# Patient Record
Sex: FEMALE | Race: WHITE | Employment: OTHER | URBAN - METROPOLITAN AREA
[De-identification: names, ages, dates, MRNs, and addresses within clinical notes are randomized per-mention and may not be internally consistent; named-entity substitution may affect disease eponyms.]

---

## 2018-09-25 ENCOUNTER — OFFICE VISIT (OUTPATIENT)
Dept: INTERNAL MEDICINE CLINIC | Age: 68
End: 2018-09-25
Payer: MEDICARE

## 2018-09-25 VITALS
SYSTOLIC BLOOD PRESSURE: 146 MMHG | BODY MASS INDEX: 39.69 KG/M2 | HEIGHT: 69 IN | WEIGHT: 268 LBS | DIASTOLIC BLOOD PRESSURE: 72 MMHG

## 2018-09-25 DIAGNOSIS — Z23 NEED FOR PROPHYLACTIC VACCINATION AGAINST DIPHTHERIA-TETANUS-PERTUSSIS (DTP): ICD-10-CM

## 2018-09-25 DIAGNOSIS — Z23 NEED FOR PROPHYLACTIC VACCINATION AND INOCULATION AGAINST VARICELLA: ICD-10-CM

## 2018-09-25 DIAGNOSIS — Z13.1 ENCOUNTER FOR SCREENING FOR DIABETES MELLITUS: ICD-10-CM

## 2018-09-25 DIAGNOSIS — G47.33 OBSTRUCTIVE SLEEP APNEA SYNDROME: ICD-10-CM

## 2018-09-25 DIAGNOSIS — E03.8 OTHER SPECIFIED HYPOTHYROIDISM: ICD-10-CM

## 2018-09-25 DIAGNOSIS — Z12.31 ENCOUNTER FOR SCREENING MAMMOGRAM FOR BREAST CANCER: ICD-10-CM

## 2018-09-25 DIAGNOSIS — E66.01 CLASS 2 SEVERE OBESITY DUE TO EXCESS CALORIES WITH SERIOUS COMORBIDITY AND BODY MASS INDEX (BMI) OF 39.0 TO 39.9 IN ADULT (HCC): ICD-10-CM

## 2018-09-25 DIAGNOSIS — Z23 NEED FOR PROPHYLACTIC VACCINATION AGAINST STREPTOCOCCUS PNEUMONIAE (PNEUMOCOCCUS): ICD-10-CM

## 2018-09-25 DIAGNOSIS — J30.9 ALLERGIC RHINITIS, UNSPECIFIED SEASONALITY, UNSPECIFIED TRIGGER: ICD-10-CM

## 2018-09-25 DIAGNOSIS — E78.00 PURE HYPERCHOLESTEROLEMIA: ICD-10-CM

## 2018-09-25 DIAGNOSIS — F33.41 RECURRENT MAJOR DEPRESSIVE DISORDER, IN PARTIAL REMISSION (HCC): ICD-10-CM

## 2018-09-25 DIAGNOSIS — M15.9 PRIMARY OSTEOARTHRITIS INVOLVING MULTIPLE JOINTS: ICD-10-CM

## 2018-09-25 DIAGNOSIS — Z13.220 SCREENING FOR HYPERLIPIDEMIA: ICD-10-CM

## 2018-09-25 DIAGNOSIS — Z12.11 SCREENING FOR COLON CANCER: ICD-10-CM

## 2018-09-25 DIAGNOSIS — M16.11 ARTHRITIS OF RIGHT HIP: ICD-10-CM

## 2018-09-25 DIAGNOSIS — Z78.0 POST-MENOPAUSAL: ICD-10-CM

## 2018-09-25 DIAGNOSIS — E11.9 TYPE 2 DIABETES MELLITUS WITHOUT COMPLICATION, WITHOUT LONG-TERM CURRENT USE OF INSULIN (HCC): ICD-10-CM

## 2018-09-25 DIAGNOSIS — I30.0 IDIOPATHIC PERICARDITIS, UNSPECIFIED CHRONICITY: ICD-10-CM

## 2018-09-25 DIAGNOSIS — Z86.010 PERSONAL HISTORY OF COLONIC POLYPS: ICD-10-CM

## 2018-09-25 DIAGNOSIS — K57.30 DIVERTICULOSIS OF LARGE INTESTINE WITHOUT HEMORRHAGE: ICD-10-CM

## 2018-09-25 DIAGNOSIS — M79.672 INTRACTABLE LEFT HEEL PAIN: ICD-10-CM

## 2018-09-25 PROBLEM — I31.9 PERICARDITIS: Status: ACTIVE | Noted: 2018-09-25

## 2018-09-25 PROBLEM — Z86.0100 PERSONAL HISTORY OF COLONIC POLYPS: Status: ACTIVE | Noted: 2018-09-25

## 2018-09-25 PROBLEM — E66.812 CLASS 2 SEVERE OBESITY DUE TO EXCESS CALORIES WITH SERIOUS COMORBIDITY AND BODY MASS INDEX (BMI) OF 39.0 TO 39.9 IN ADULT: Status: ACTIVE | Noted: 2018-09-25

## 2018-09-25 PROBLEM — M15.0 PRIMARY OSTEOARTHRITIS INVOLVING MULTIPLE JOINTS: Status: ACTIVE | Noted: 2018-09-25

## 2018-09-25 PROCEDURE — 3046F HEMOGLOBIN A1C LEVEL >9.0%: CPT | Performed by: INTERNAL MEDICINE

## 2018-09-25 PROCEDURE — G8400 PT W/DXA NO RESULTS DOC: HCPCS | Performed by: INTERNAL MEDICINE

## 2018-09-25 PROCEDURE — 4040F PNEUMOC VAC/ADMIN/RCVD: CPT | Performed by: INTERNAL MEDICINE

## 2018-09-25 PROCEDURE — 1123F ACP DISCUSS/DSCN MKR DOCD: CPT | Performed by: INTERNAL MEDICINE

## 2018-09-25 PROCEDURE — 1036F TOBACCO NON-USER: CPT | Performed by: INTERNAL MEDICINE

## 2018-09-25 PROCEDURE — 3017F COLORECTAL CA SCREEN DOC REV: CPT | Performed by: INTERNAL MEDICINE

## 2018-09-25 PROCEDURE — G8427 DOCREV CUR MEDS BY ELIG CLIN: HCPCS | Performed by: INTERNAL MEDICINE

## 2018-09-25 PROCEDURE — 2022F DILAT RTA XM EVC RTNOPTHY: CPT | Performed by: INTERNAL MEDICINE

## 2018-09-25 PROCEDURE — 99205 OFFICE O/P NEW HI 60 MIN: CPT | Performed by: INTERNAL MEDICINE

## 2018-09-25 PROCEDURE — G8417 CALC BMI ABV UP PARAM F/U: HCPCS | Performed by: INTERNAL MEDICINE

## 2018-09-25 PROCEDURE — 1090F PRES/ABSN URINE INCON ASSESS: CPT | Performed by: INTERNAL MEDICINE

## 2018-09-25 PROCEDURE — 1101F PT FALLS ASSESS-DOCD LE1/YR: CPT | Performed by: INTERNAL MEDICINE

## 2018-09-25 RX ORDER — RANITIDINE 150 MG/1
150 CAPSULE ORAL 2 TIMES DAILY
COMMUNITY
End: 2019-07-17 | Stop reason: SDUPTHER

## 2018-09-25 RX ORDER — MELOXICAM 15 MG/1
15 TABLET ORAL DAILY
COMMUNITY
End: 2019-09-26 | Stop reason: SDUPTHER

## 2018-09-25 RX ORDER — FLUTICASONE PROPIONATE 50 MCG
1 SPRAY, SUSPENSION (ML) NASAL DAILY
COMMUNITY
End: 2021-03-23

## 2018-09-25 RX ORDER — ATORVASTATIN CALCIUM 10 MG/1
10 TABLET, FILM COATED ORAL DAILY
COMMUNITY
End: 2019-06-13 | Stop reason: SDUPTHER

## 2018-09-25 RX ORDER — OMEPRAZOLE 40 MG/1
40 CAPSULE, DELAYED RELEASE ORAL DAILY
COMMUNITY
End: 2019-12-16 | Stop reason: SDUPTHER

## 2018-09-25 RX ORDER — ASPIRIN/CALCIUM/MAG/ALUMINUM 325 MG
TABLET ORAL
COMMUNITY
End: 2019-11-20

## 2018-09-25 RX ORDER — OLMESARTAN MEDOXOMIL AND HYDROCHLOROTHIAZIDE 40/12.5 40; 12.5 MG/1; MG/1
1 TABLET ORAL DAILY
COMMUNITY
End: 2019-11-12 | Stop reason: SDUPTHER

## 2018-09-25 RX ORDER — METOPROLOL SUCCINATE 50 MG/1
100 TABLET, EXTENDED RELEASE ORAL DAILY
COMMUNITY
End: 2018-11-06 | Stop reason: SDUPTHER

## 2018-09-25 RX ORDER — FERROUS SULFATE 325(65) MG
325 TABLET ORAL
COMMUNITY
End: 2019-11-20

## 2018-09-25 RX ORDER — BUPROPION HYDROCHLORIDE 150 MG/1
150 TABLET ORAL EVERY MORNING
COMMUNITY
End: 2019-02-28 | Stop reason: SDUPTHER

## 2018-09-25 RX ORDER — LEVOTHYROXINE SODIUM 0.03 MG/1
25 TABLET ORAL DAILY
COMMUNITY
End: 2020-02-10 | Stop reason: SDUPTHER

## 2018-09-25 ASSESSMENT — PATIENT HEALTH QUESTIONNAIRE - PHQ9
1. LITTLE INTEREST OR PLEASURE IN DOING THINGS: 1
SUM OF ALL RESPONSES TO PHQ QUESTIONS 1-9: 1
SUM OF ALL RESPONSES TO PHQ9 QUESTIONS 1 & 2: 1
2. FEELING DOWN, DEPRESSED OR HOPELESS: 0
SUM OF ALL RESPONSES TO PHQ QUESTIONS 1-9: 1

## 2018-09-25 ASSESSMENT — ENCOUNTER SYMPTOMS
WHEEZING: 0
BACK PAIN: 1
CHEST TIGHTNESS: 0
ABDOMINAL PAIN: 0
COLOR CHANGE: 0

## 2018-09-25 NOTE — PATIENT INSTRUCTIONS
Check bp 3-4 times a week preferably when you get home from work and other days in am after sitting for 5 mins but no caffeine,smoking or exercise for 30 mins prior-  and in a chair w a back and both feet down-recheck in another 5 mins  Goal is under 130/80  We will adjust medication if it is not coming down below this  Record over the next 1-2 mos and feel free to call if you are concerned  Do labs and see me in 3 mos for a follow up       High-Fiber Diet  Fiber is found in fruits, vegetables, and grains. A high-fiber diet encourages the addition of more whole grains, legumes, fruits, and vegetables in your diet. Gradually increase the fiber in your diet to 28-30 g daily. Eat a variety of high-fiber foods instead of only a select few type of foods. If you cannot get the amount of fiber needed in your diet you can supplement with Benefiber. Mix w water but it doesn't taste like anything!! Be sure to increase the fiber GRADUALLY to avoid stomach pain and gaseousness. For constipation short term use miralax - a safe stool softener/laxative- start w 1 capful in water once daily- increase by 1 capful daily until it works up to 3 capfuls twice daily. Exercise and drinking lots of water helps also!!!!  PURPOSE  · To increase stool bulk. · To make bowel movements more regular to prevent constipation. · To lower cholesterol. · To prevent overeating. WHEN IS THIS DIET USED? · It may be used if you have constipation and hemorrhoids. · It may be used if you have uncomplicated diverticulosis (intestine condition) and irritable bowel syndrome. · It may be used if you need help with weight management. · It may be used if you want to add it to your diet as a protective measure against atherosclerosis, diabetes, and cancer. SOURCES OF FIBER  · Whole-grain breads and cereals. · Fruits, such as apples, oranges, bananas, berries, prunes, and pears.   · Vegetables, such as green peas, carrots, sweet potatoes, beets, broccoli, cabbage, spinach, and artichokes. · Legumes, such split peas, soy, lentils. · Almonds.   FIBER CONTENT IN FOODS  Starches and Grains / Dietary Fiber (g)  · Cheerios, 1 cup / 3 g  · Corn Flakes cereal, 1 cup / 0.7 g  · Rice crispy treat cereal, 1¼ cup / 0.3 g  · Instant oatmeal (cooked), ½ cup / 2 g  · Frosted wheat cereal, 1 cup / 5.1 g  · Brown, long-grain rice (cooked), 1 cup / 3.5 g  · White, long-grain rice (cooked), 1 cup / 0.6 g  · Enriched macaroni (cooked), 1 cup / 2.5 g  Legumes / Dietary Fiber (g)  · Baked beans (canned, plain, or vegetarian), ½ cup / 5.2 g  · Kidney beans (canned), ½ cup / 6.8 g  · Segal beans (cooked), ½ cup / 5.5 g  Breads and Crackers / Dietary Fiber (g)  · Plain or honey omar crackers, 2 squares / 0.7 g  · Saltine crackers, 3 squares / 0.3 g  · Plain, salted pretzels, 10 pieces / 1.8 g  · Whole-wheat bread, 1 slice / 1.9 g  · White bread, 1 slice / 0.7 g  · Raisin bread, 1 slice / 1.2 g  · Plain bagel, 3 oz / 2 g  · Flour tortilla, 1 oz / 0.9 g  · Corn tortilla, 1 small / 1.5 g  · Hamburger or hotdog bun, 1 small / 0.9 g  Fruits / Dietary Fiber (g)  · Apple with skin, 1 medium / 4.4 g  · Sweetened applesauce, ½ cup / 1.5 g  · Banana, ½ medium / 1.5 g  · Grapes, 10 grapes / 0.4 g  · Orange, 1 small / 2.3 g  · Raisin, 1.5 oz / 1.6 g  · Melon, 1 cup / 1.4 g  Vegetables / Dietary Fiber (g)  · Green beans (canned), ½ cup / 1.3 g  · Carrots (cooked), ½ cup / 2.3 g  · Broccoli (cooked), ½ cup / 2.8 g  · Peas (cooked), ½ cup / 4.4 g  · Mashed potatoes, ½ cup / 1.6 g  · Lettuce, 1 cup / 0.5 g  · Corn (canned), ½ cup / 1.6 g  · Tomato, ½ cup / 1.1 g    Do labs in about 2 mos and see me a few days later for a recheck

## 2018-09-25 NOTE — PROGRESS NOTES
Subjective:      Patient ID: Sima Marley is a 79 y.o. female. Chief Complaint   Patient presents with   Becca Judd New Doctor     new patient labs needed? patient brought some records,cardiologist needed? In as a new patient. Pt has MULTIPLE issues. Has been having issues w bp meds as valsartan was recalled and now on olmesartan w fair results- Pt taking thyroid meds reliably every am fasting-struggles w stress/anxoiety and depression related to her chronic pain and joint issues. Pt unsure if labs were checking for thyroid recently- has struggled w her weight for years- had lost 75#'s but regained it after a knee injury 2010- then this triggered multiple joint issues- now s/p 5 joint replacements!! Needs right hip replaced soon- also has sleep apnea and using cpap regularly w good response has had severe daytime fatigue-has h/o pericarditis but not active now- sleep is difficult w hip pain    Outpatient Encounter Prescriptions as of 9/25/2018   Medication Sig Dispense Refill    zoster recombinant adjuvanted vaccine (SHINGRIX) 50 MCG SUSR injection 50 MCG IM then repeat 2-6 months.  0.5 mL 1    Aspirin Buf,[id number]-[id number], (BUFFERED ASPIRIN) 325 MG TABS Take by mouth      ferrous sulfate 325 (65 Fe) MG tablet Take 325 mg by mouth daily (with breakfast)      aspirin 81 MG tablet Take 81 mg by mouth daily      fluticasone (FLONASE) 50 MCG/ACT nasal spray 1 spray by Each Nare route daily      levothyroxine (SYNTHROID) 25 MCG tablet Take 25 mcg by mouth Daily      atorvastatin (LIPITOR) 10 MG tablet Take 10 mg by mouth daily      meloxicam (MOBIC) 15 MG tablet Take 15 mg by mouth daily      omeprazole (PRILOSEC) 40 MG delayed release capsule Take 40 mg by mouth daily      buPROPion (WELLBUTRIN XL) 150 MG extended release tablet Take 150 mg by mouth every morning      ranitidine (ZANTAC) 150 MG capsule Take 150 mg by mouth 2 times daily      metoprolol succinate (TOPROL XL) 50 MG extended release

## 2018-10-01 ENCOUNTER — TELEPHONE (OUTPATIENT)
Dept: INTERNAL MEDICINE CLINIC | Age: 68
End: 2018-10-01

## 2018-11-06 ENCOUNTER — TELEPHONE (OUTPATIENT)
Dept: INTERNAL MEDICINE CLINIC | Age: 68
End: 2018-11-06

## 2018-11-06 RX ORDER — METOPROLOL SUCCINATE 100 MG/1
100 TABLET, EXTENDED RELEASE ORAL DAILY
Qty: 90 TABLET | Refills: 3 | Status: SHIPPED | OUTPATIENT
Start: 2018-11-06 | End: 2019-08-27 | Stop reason: SDUPTHER

## 2018-11-06 NOTE — TELEPHONE ENCOUNTER
Patient reports that she takes 100 mg per day of med listed below, please send an update order to pharmacy listed.     metoprolol succinate (TOPROL XL) 50 MG extended release tablet     [facility] Lynn Pascal

## 2018-11-06 NOTE — TELEPHONE ENCOUNTER
Left message on machine for patient per Dr. William Garcia. To call back and let the office know if she had a physical this year by previous physician so appt can be changed.

## 2018-11-19 DIAGNOSIS — Z12.11 SCREENING FOR COLON CANCER: ICD-10-CM

## 2018-11-19 DIAGNOSIS — Z13.1 ENCOUNTER FOR SCREENING FOR DIABETES MELLITUS: ICD-10-CM

## 2018-11-19 DIAGNOSIS — F33.41 RECURRENT MAJOR DEPRESSIVE DISORDER, IN PARTIAL REMISSION (HCC): ICD-10-CM

## 2018-11-19 DIAGNOSIS — Z86.010 PERSONAL HISTORY OF COLONIC POLYPS: ICD-10-CM

## 2018-11-19 DIAGNOSIS — M15.9 PRIMARY OSTEOARTHRITIS INVOLVING MULTIPLE JOINTS: ICD-10-CM

## 2018-11-19 DIAGNOSIS — G47.33 OBSTRUCTIVE SLEEP APNEA SYNDROME: ICD-10-CM

## 2018-11-19 DIAGNOSIS — E11.9 TYPE 2 DIABETES MELLITUS WITHOUT COMPLICATION, WITHOUT LONG-TERM CURRENT USE OF INSULIN (HCC): ICD-10-CM

## 2018-11-19 DIAGNOSIS — J30.9 ALLERGIC RHINITIS, UNSPECIFIED SEASONALITY, UNSPECIFIED TRIGGER: ICD-10-CM

## 2018-11-19 DIAGNOSIS — Z12.31 ENCOUNTER FOR SCREENING MAMMOGRAM FOR BREAST CANCER: ICD-10-CM

## 2018-11-19 DIAGNOSIS — Z23 NEED FOR PROPHYLACTIC VACCINATION AGAINST STREPTOCOCCUS PNEUMONIAE (PNEUMOCOCCUS): ICD-10-CM

## 2018-11-19 DIAGNOSIS — E03.8 OTHER SPECIFIED HYPOTHYROIDISM: ICD-10-CM

## 2018-11-19 DIAGNOSIS — Z78.0 POST-MENOPAUSAL: ICD-10-CM

## 2018-11-19 DIAGNOSIS — M79.672 INTRACTABLE LEFT HEEL PAIN: ICD-10-CM

## 2018-11-19 DIAGNOSIS — Z13.220 SCREENING FOR HYPERLIPIDEMIA: ICD-10-CM

## 2018-11-19 DIAGNOSIS — M16.11 ARTHRITIS OF RIGHT HIP: ICD-10-CM

## 2018-11-19 DIAGNOSIS — E78.00 PURE HYPERCHOLESTEROLEMIA: ICD-10-CM

## 2018-11-19 DIAGNOSIS — E66.01 CLASS 2 SEVERE OBESITY DUE TO EXCESS CALORIES WITH SERIOUS COMORBIDITY AND BODY MASS INDEX (BMI) OF 39.0 TO 39.9 IN ADULT (HCC): ICD-10-CM

## 2018-11-19 DIAGNOSIS — I30.0 IDIOPATHIC PERICARDITIS, UNSPECIFIED CHRONICITY: ICD-10-CM

## 2018-11-19 DIAGNOSIS — K57.30 DIVERTICULOSIS OF LARGE INTESTINE WITHOUT HEMORRHAGE: ICD-10-CM

## 2018-11-19 DIAGNOSIS — Z23 NEED FOR PROPHYLACTIC VACCINATION AND INOCULATION AGAINST VARICELLA: ICD-10-CM

## 2018-11-19 DIAGNOSIS — Z23 NEED FOR PROPHYLACTIC VACCINATION AGAINST DIPHTHERIA-TETANUS-PERTUSSIS (DTP): ICD-10-CM

## 2018-11-19 LAB
ALBUMIN SERPL-MCNC: 4.2 G/DL (ref 3.4–5)
ANION GAP SERPL CALCULATED.3IONS-SCNC: 14 MMOL/L (ref 3–16)
BUN BLDV-MCNC: 19 MG/DL (ref 7–20)
CALCIUM SERPL-MCNC: 9.7 MG/DL (ref 8.3–10.6)
CHLORIDE BLD-SCNC: 102 MMOL/L (ref 99–110)
CHOLESTEROL, TOTAL: 160 MG/DL (ref 0–199)
CO2: 28 MMOL/L (ref 21–32)
CREAT SERPL-MCNC: <0.5 MG/DL (ref 0.6–1.2)
CREATININE URINE: 174 MG/DL (ref 28–259)
GFR AFRICAN AMERICAN: >60
GFR NON-AFRICAN AMERICAN: >60
GLUCOSE BLD-MCNC: 119 MG/DL (ref 70–99)
HDLC SERPL-MCNC: 40 MG/DL (ref 40–60)
LDL CHOLESTEROL CALCULATED: 83 MG/DL
MICROALBUMIN UR-MCNC: <1.2 MG/DL
MICROALBUMIN/CREAT UR-RTO: NORMAL MG/G (ref 0–30)
PHOSPHORUS: 3.7 MG/DL (ref 2.5–4.9)
POTASSIUM SERPL-SCNC: 4.6 MMOL/L (ref 3.5–5.1)
SODIUM BLD-SCNC: 144 MMOL/L (ref 136–145)
T4 FREE: 1.2 NG/DL (ref 0.9–1.8)
TRIGL SERPL-MCNC: 185 MG/DL (ref 0–150)
TSH SERPL DL<=0.05 MIU/L-ACNC: 3.8 UIU/ML (ref 0.27–4.2)
VLDLC SERPL CALC-MCNC: 37 MG/DL

## 2018-11-20 LAB
ESTIMATED AVERAGE GLUCOSE: 137 MG/DL
HBA1C MFR BLD: 6.4 %

## 2018-11-27 ENCOUNTER — OFFICE VISIT (OUTPATIENT)
Dept: INTERNAL MEDICINE CLINIC | Age: 68
End: 2018-11-27
Payer: MEDICARE

## 2018-11-27 VITALS
SYSTOLIC BLOOD PRESSURE: 140 MMHG | BODY MASS INDEX: 39.69 KG/M2 | WEIGHT: 268 LBS | HEIGHT: 69 IN | DIASTOLIC BLOOD PRESSURE: 80 MMHG

## 2018-11-27 DIAGNOSIS — M15.9 PRIMARY OSTEOARTHRITIS INVOLVING MULTIPLE JOINTS: ICD-10-CM

## 2018-11-27 DIAGNOSIS — E11.9 TYPE 2 DIABETES MELLITUS WITHOUT COMPLICATION, WITHOUT LONG-TERM CURRENT USE OF INSULIN (HCC): ICD-10-CM

## 2018-11-27 DIAGNOSIS — E03.8 OTHER SPECIFIED HYPOTHYROIDISM: ICD-10-CM

## 2018-11-27 DIAGNOSIS — E66.01 CLASS 2 SEVERE OBESITY DUE TO EXCESS CALORIES WITH SERIOUS COMORBIDITY AND BODY MASS INDEX (BMI) OF 39.0 TO 39.9 IN ADULT (HCC): ICD-10-CM

## 2018-11-27 DIAGNOSIS — S16.1XXD STRAIN OF NECK MUSCLE, SUBSEQUENT ENCOUNTER: ICD-10-CM

## 2018-11-27 DIAGNOSIS — E78.00 PURE HYPERCHOLESTEROLEMIA: ICD-10-CM

## 2018-11-27 PROBLEM — S16.1XXA NECK STRAIN: Status: ACTIVE | Noted: 2018-11-27

## 2018-11-27 PROCEDURE — G8400 PT W/DXA NO RESULTS DOC: HCPCS | Performed by: INTERNAL MEDICINE

## 2018-11-27 PROCEDURE — 1036F TOBACCO NON-USER: CPT | Performed by: INTERNAL MEDICINE

## 2018-11-27 PROCEDURE — 3044F HG A1C LEVEL LT 7.0%: CPT | Performed by: INTERNAL MEDICINE

## 2018-11-27 PROCEDURE — G8484 FLU IMMUNIZE NO ADMIN: HCPCS | Performed by: INTERNAL MEDICINE

## 2018-11-27 PROCEDURE — 3017F COLORECTAL CA SCREEN DOC REV: CPT | Performed by: INTERNAL MEDICINE

## 2018-11-27 PROCEDURE — 1101F PT FALLS ASSESS-DOCD LE1/YR: CPT | Performed by: INTERNAL MEDICINE

## 2018-11-27 PROCEDURE — G8417 CALC BMI ABV UP PARAM F/U: HCPCS | Performed by: INTERNAL MEDICINE

## 2018-11-27 PROCEDURE — 1123F ACP DISCUSS/DSCN MKR DOCD: CPT | Performed by: INTERNAL MEDICINE

## 2018-11-27 PROCEDURE — G8427 DOCREV CUR MEDS BY ELIG CLIN: HCPCS | Performed by: INTERNAL MEDICINE

## 2018-11-27 PROCEDURE — 2022F DILAT RTA XM EVC RTNOPTHY: CPT | Performed by: INTERNAL MEDICINE

## 2018-11-27 PROCEDURE — 1090F PRES/ABSN URINE INCON ASSESS: CPT | Performed by: INTERNAL MEDICINE

## 2018-11-27 PROCEDURE — 4040F PNEUMOC VAC/ADMIN/RCVD: CPT | Performed by: INTERNAL MEDICINE

## 2018-11-27 PROCEDURE — 99214 OFFICE O/P EST MOD 30 MIN: CPT | Performed by: INTERNAL MEDICINE

## 2018-11-27 RX ORDER — DILTIAZEM HYDROCHLORIDE 120 MG/1
120 CAPSULE, COATED, EXTENDED RELEASE ORAL DAILY
COMMUNITY
End: 2018-11-27 | Stop reason: SDUPTHER

## 2018-11-27 RX ORDER — METFORMIN HYDROCHLORIDE 500 MG/1
500 TABLET, EXTENDED RELEASE ORAL 2 TIMES DAILY
Qty: 180 TABLET | Refills: 3
Start: 2018-11-27 | End: 2019-10-10 | Stop reason: SDUPTHER

## 2018-11-27 RX ORDER — DILTIAZEM HYDROCHLORIDE 120 MG/1
120 CAPSULE, COATED, EXTENDED RELEASE ORAL DAILY
Qty: 30 CAPSULE | Refills: 0 | Status: SHIPPED | OUTPATIENT
Start: 2018-11-27 | End: 2020-02-10 | Stop reason: SDUPTHER

## 2018-12-06 ASSESSMENT — ENCOUNTER SYMPTOMS
COLOR CHANGE: 0
BACK PAIN: 0
CHEST TIGHTNESS: 0
ABDOMINAL PAIN: 0
WHEEZING: 0

## 2019-02-05 ENCOUNTER — TELEPHONE (OUTPATIENT)
Dept: INTERNAL MEDICINE CLINIC | Age: 69
End: 2019-02-05

## 2019-02-28 ENCOUNTER — TELEPHONE (OUTPATIENT)
Dept: INTERNAL MEDICINE CLINIC | Age: 69
End: 2019-02-28

## 2019-02-28 RX ORDER — BUPROPION HYDROCHLORIDE 150 MG/1
150 TABLET ORAL EVERY MORNING
Qty: 90 TABLET | Refills: 3 | Status: SHIPPED | OUTPATIENT
Start: 2019-02-28 | End: 2019-12-16 | Stop reason: SDUPTHER

## 2019-03-28 DIAGNOSIS — E11.9 TYPE 2 DIABETES MELLITUS WITHOUT COMPLICATION, WITHOUT LONG-TERM CURRENT USE OF INSULIN (HCC): ICD-10-CM

## 2019-03-28 DIAGNOSIS — E03.8 OTHER SPECIFIED HYPOTHYROIDISM: ICD-10-CM

## 2019-03-28 DIAGNOSIS — E66.01 CLASS 2 SEVERE OBESITY DUE TO EXCESS CALORIES WITH SERIOUS COMORBIDITY AND BODY MASS INDEX (BMI) OF 39.0 TO 39.9 IN ADULT (HCC): ICD-10-CM

## 2019-03-28 LAB
T4 FREE: 1.2 NG/DL (ref 0.9–1.8)
TSH SERPL DL<=0.05 MIU/L-ACNC: 2.61 UIU/ML (ref 0.27–4.2)

## 2019-03-29 LAB
ESTIMATED AVERAGE GLUCOSE: 145.6 MG/DL
HBA1C MFR BLD: 6.7 %

## 2019-04-08 ENCOUNTER — OFFICE VISIT (OUTPATIENT)
Dept: INTERNAL MEDICINE CLINIC | Age: 69
End: 2019-04-08
Payer: MEDICARE

## 2019-04-08 VITALS
WEIGHT: 275 LBS | SYSTOLIC BLOOD PRESSURE: 136 MMHG | BODY MASS INDEX: 40.73 KG/M2 | DIASTOLIC BLOOD PRESSURE: 82 MMHG | HEIGHT: 69 IN

## 2019-04-08 DIAGNOSIS — E11.9 TYPE 2 DIABETES MELLITUS WITHOUT COMPLICATION, WITHOUT LONG-TERM CURRENT USE OF INSULIN (HCC): ICD-10-CM

## 2019-04-08 DIAGNOSIS — E66.01 CLASS 2 SEVERE OBESITY DUE TO EXCESS CALORIES WITH SERIOUS COMORBIDITY AND BODY MASS INDEX (BMI) OF 39.0 TO 39.9 IN ADULT (HCC): ICD-10-CM

## 2019-04-08 DIAGNOSIS — Z23 NEED FOR PROPHYLACTIC VACCINATION AGAINST DIPHTHERIA-TETANUS-PERTUSSIS (DTP): ICD-10-CM

## 2019-04-08 DIAGNOSIS — Z12.31 ENCOUNTER FOR SCREENING MAMMOGRAM FOR BREAST CANCER: ICD-10-CM

## 2019-04-08 DIAGNOSIS — Z78.0 POST-MENOPAUSAL: ICD-10-CM

## 2019-04-08 DIAGNOSIS — E03.8 OTHER SPECIFIED HYPOTHYROIDISM: ICD-10-CM

## 2019-04-08 DIAGNOSIS — M81.0 OSTEOPOROSIS WITHOUT CURRENT PATHOLOGICAL FRACTURE, UNSPECIFIED OSTEOPOROSIS TYPE: ICD-10-CM

## 2019-04-08 DIAGNOSIS — M79.672 INTRACTABLE LEFT HEEL PAIN: ICD-10-CM

## 2019-04-08 DIAGNOSIS — Z12.11 SPECIAL SCREENING FOR MALIGNANT NEOPLASMS, COLON: Primary | ICD-10-CM

## 2019-04-08 PROCEDURE — G8400 PT W/DXA NO RESULTS DOC: HCPCS | Performed by: INTERNAL MEDICINE

## 2019-04-08 PROCEDURE — 2022F DILAT RTA XM EVC RTNOPTHY: CPT | Performed by: INTERNAL MEDICINE

## 2019-04-08 PROCEDURE — 3017F COLORECTAL CA SCREEN DOC REV: CPT | Performed by: INTERNAL MEDICINE

## 2019-04-08 PROCEDURE — G8417 CALC BMI ABV UP PARAM F/U: HCPCS | Performed by: INTERNAL MEDICINE

## 2019-04-08 PROCEDURE — 1090F PRES/ABSN URINE INCON ASSESS: CPT | Performed by: INTERNAL MEDICINE

## 2019-04-08 PROCEDURE — 3044F HG A1C LEVEL LT 7.0%: CPT | Performed by: INTERNAL MEDICINE

## 2019-04-08 PROCEDURE — 4040F PNEUMOC VAC/ADMIN/RCVD: CPT | Performed by: INTERNAL MEDICINE

## 2019-04-08 PROCEDURE — 99214 OFFICE O/P EST MOD 30 MIN: CPT | Performed by: INTERNAL MEDICINE

## 2019-04-08 PROCEDURE — 1123F ACP DISCUSS/DSCN MKR DOCD: CPT | Performed by: INTERNAL MEDICINE

## 2019-04-08 PROCEDURE — 1036F TOBACCO NON-USER: CPT | Performed by: INTERNAL MEDICINE

## 2019-04-08 PROCEDURE — G8427 DOCREV CUR MEDS BY ELIG CLIN: HCPCS | Performed by: INTERNAL MEDICINE

## 2019-04-08 ASSESSMENT — PATIENT HEALTH QUESTIONNAIRE - PHQ9
SUM OF ALL RESPONSES TO PHQ QUESTIONS 1-9: 1
2. FEELING DOWN, DEPRESSED OR HOPELESS: 0
SUM OF ALL RESPONSES TO PHQ9 QUESTIONS 1 & 2: 1
SUM OF ALL RESPONSES TO PHQ QUESTIONS 1-9: 1
1. LITTLE INTEREST OR PLEASURE IN DOING THINGS: 1

## 2019-04-08 NOTE — PATIENT INSTRUCTIONS
Anamaria Cote- Dr. Joseph Nguyễn and Dr. Stacy Salazar Hearing [phone]    Do labs in 3 mos and see me a few days later

## 2019-04-08 NOTE — PROGRESS NOTES
Subjective:      Patient ID: Griselda Peterson is a 76 y.o. female. Chief Complaint   Patient presents with    Discuss Labs     review labs,mammo,dexa,colonoscopy needed ,seeing derm and heart doctor,left foot still hurting,neck also       Still having a lot of left foot and ankle pain- unable to walk at all due to pain- still having a lot of neck pain still but stretches are helping some- still struggling w her weight- no progress and very difficult to exercise- not making much progress in her diabetes control- A1C is up and weight also up    Review of Systems   Constitutional: Negative for activity change, appetite change and fatigue. HENT: Negative for congestion. Eyes: Negative for visual disturbance. Respiratory: Negative for chest tightness and wheezing. Cardiovascular: Negative for chest pain and palpitations. Gastrointestinal: Negative for abdominal pain. Musculoskeletal: Positive for arthralgias, gait problem and myalgias. Negative for back pain. Skin: Negative for color change and rash. Neurological: Negative for weakness, light-headedness and headaches. Objective:   Physical Exam   Constitutional: She is oriented to person, place, and time. She appears well-developed and well-nourished. HENT:   Head: Normocephalic and atraumatic. Eyes: Pupils are equal, round, and reactive to light. Conjunctivae and EOM are normal.   Neck: Normal range of motion. Neck supple. Cardiovascular: Normal rate, regular rhythm and normal heart sounds. Pulmonary/Chest: Effort normal and breath sounds normal. No stridor. No respiratory distress. Abdominal: She exhibits no distension. There is no tenderness. Lymphadenopathy:     She has no cervical adenopathy. Neurological: She is alert and oriented to person, place, and time. Skin: Skin is warm and dry. Psychiatric: She has a normal mood and affect.  Her behavior is normal. Judgment and thought content normal.         Assessment and Plan:

## 2019-04-29 ASSESSMENT — ENCOUNTER SYMPTOMS
WHEEZING: 0
COLOR CHANGE: 0
ABDOMINAL PAIN: 0
BACK PAIN: 0
CHEST TIGHTNESS: 0

## 2019-05-09 ENCOUNTER — OFFICE VISIT (OUTPATIENT)
Dept: INTERNAL MEDICINE CLINIC | Age: 69
End: 2019-05-09

## 2019-05-09 DIAGNOSIS — E11.9 TYPE 2 DIABETES MELLITUS WITHOUT COMPLICATION, WITHOUT LONG-TERM CURRENT USE OF INSULIN (HCC): Primary | ICD-10-CM

## 2019-05-09 PROCEDURE — 99999 PR OFFICE/OUTPT VISIT,PROCEDURE ONLY: CPT | Performed by: DIETITIAN, REGISTERED

## 2019-05-09 NOTE — PATIENT INSTRUCTIONS
BEHAVIOR GOALS FOR DIABETES CARE    When to eat: Every 5 hours while awake      What and how much to eat:  Follow the Plate guide and aim for about 2 carb servings (30 g carb) per meal    Physical Activity: plan on starting swimming when returning from vacation     Keeping Records: Track food intake (optional)      Other: Pay attention to Hunger/Fullness; stop eating when satisfied

## 2019-05-09 NOTE — PROGRESS NOTES
Medical Nutrition Therapy for Diabetes    Anette Patel  May 9, 2019      Patient Care Team:  Camilo Toth MD as PCP - General (Internal Medicine)    Reason for visit: overweight and diabetes    Leaving for trip to [facility] next week--will be gone for one month    ASSESSMENT/PLAN:   NUTRITION DIAGNOSIS    #1 Problem: Altered Nutrition-Related Laboratory Values (NC-2.2)  Related to: Endocrine/Diabetes   As Evidenced by: Elevated Plasma glucose and/or HgbA1c levels           #2 Problem: Overweight/Obesity (NC-3.3)  Related to: Excessive energy intake or physical inactivity  As Evidenced by: BMI more than normative standard for age and sex (BMI=40.61)      NUTRITION INTERVENTION  Nutrition Prescription: 30 g carb per meal      Diabetes Education/Counseling included:  Carbohydrate Control and Activity/exercise    Interventions:  Control Carbohydrate Intake using Plate Guide and Promote weight reduction by paying attention to hunger/fullness      NUTRITION MONITORING AND EVALUATION  Indicator/Goal   #1  Control Carb intake    #2  Plan meals according to plate guide   #3  Add swimming       Patient Instructions   [address]    When to eat: Every 5 hours while awake      What and how much to eat:  Follow the Plate guide and aim for about 2 carb servings (30 g carb) per meal    Physical Activity: plan on starting swimming when returning from vacation     Keeping Records: Track food intake (optional)      Other: Pay attention to Hunger/Fullness; stop eating when satisfied               Patient Active Problem List   Diagnosis    Personal history of colonic polyps    Diverticulosis of large intestine without hemorrhage    Other specified hypothyroidism    Pure hypercholesterolemia    Recurrent major depressive disorder, in partial remission ([facility] [address]    Type 2 diabetes mellitus without complication, without long-term current use of insulin (HCC)    Primary osteoarthritis involving multiple joints    Arthritis of right hip    Intractable left heel pain    Class 2 severe obesity due to excess calories with serious comorbidity and body mass index (BMI) of 39.0 to 39.9 in adult (HCC)    Allergic rhinitis    Obstructive sleep apnea syndrome    Pericarditis    Neck strain       Current Outpatient Medications   Medication Sig Dispense Refill    buPROPion (WELLBUTRIN XL) 150 MG extended release tablet Take 1 tablet by mouth every morning 90 tablet 3    diltiazem (CARDIZEM CD) 120 MG extended release capsule Take 1 capsule by mouth daily 30 capsule 0    metFORMIN (GLUCOPHAGE-XR) 500 MG extended release tablet Take 1 tablet by mouth 2 times daily 180 tablet 3    metoprolol succinate (TOPROL XL) 100 MG extended release tablet Take 1 tablet by mouth daily 90 tablet 3    zoster recombinant adjuvanted vaccine (SHINGRIX) 50 MCG SUSR injection 50 MCG IM then repeat 2-6 months. 0.5 mL 1    Aspirin Buf,[id number], (BUFFERED ASPIRIN) 325 MG TABS Take by mouth      ferrous sulfate 325 (65 Fe) MG tablet Take 325 mg by mouth daily (with breakfast)      aspirin 81 MG tablet Take 81 mg by mouth daily      fluticasone (FLONASE) 50 MCG/ACT nasal spray 1 spray by Each Nare route daily      levothyroxine (SYNTHROID) 25 MCG tablet Take 25 mcg by mouth Daily      atorvastatin (LIPITOR) 10 MG tablet Take 10 mg by mouth daily      meloxicam (MOBIC) 15 MG tablet Take 15 mg by mouth daily      omeprazole (PRILOSEC) 40 MG delayed release capsule Take 40 mg by mouth daily      ranitidine (ZANTAC) 150 MG capsule Take 150 mg by mouth 2 times daily      olmesartan-hydrochlorothiazide (BENICAR HCT) 40-12.5 MG per tablet Take 1 tablet by mouth daily       No current facility-administered medications for this visit.           NUTRITION ASSESSMENT    Biochemical Data:    Lab Results   Component Value Date    LABA1C 6.7 03/28/2019     Lab Results   Component Value Date    .6 03/28/2019       Lab Results   Component Value Date    CHOL 160 11/19/2018     Lab Results   Component Value Date    TRIG 185 (H) 11/19/2018     Lab Results   Component Value Date    HDL 40 11/19/2018     Lab Results   Component Value Date    LDLCALC 83 11/19/2018     Lab Results   Component Value Date    LABVLDL 37 11/19/2018     No results found for: CHOLHDLRATIO    No results found for: WBC, HGB, HCT, MCV, PLT    Lab Results   Component Value Date    CREATININE <0.5 (L) 11/19/2018    BUN 19 11/19/2018     11/19/2018    K 4.6 11/19/2018     11/19/2018    CO2 28 11/19/2018         Anthropometric Measurements:   Wt:   Wt Readings from Last 3 Encounters:   04/08/19 275 lb (124.7 kg)   11/27/18 268 lb (121.6 kg)   09/25/18 268 lb (121.6 kg)      BMI:   BMI Readings from Last 3 Encounters:   04/08/19 40.61 kg/m²   11/27/18 39.58 kg/m²   09/25/18 39.58 kg/m²     Usual adult weight fluctuates   Patient's stated goal weight: just below 200 lbs  7% Weight loss goal weight: 256 lbs    Nutrition-Focused Physical Findings:  General Appearance: Healthy, well-nourished  Pleasant, engaged      Food and Nutrition History:   Nutrition Awareness/Previous DSMES: none  Beverage consumption: water/iced tea  Frequency of Meals Eaten away from home  Alcohol consumption: wine 0 - 2 per week; gin and diet tonic in summer  Food Availability Problems: none  Usual Food consumption: FOOD RECALL    First meal--Usual time: 9am  Today: blueberries with honey nut cheerios, skim milk  Different Day: same    Snack  none    Second meal--Usual time: 1 pm  Recent: 2 eggs with 2 blueberry pancakes + syrup  Different Day: yogurt/fruit OR 1 slice bread with peanut butter and jelly OR grilled cheese sandwich    Snack  Crackers & cheese    Third meal--Usual time: 6 - 7pm  Recent: chicken taco salad from [employer] (salad;chicken/guacamole/sour cream) in taco bowl, a few chips  Different Day: meat three times per week (meatloaf, salmon, chicken); OR roasted vegetables    Snack  nuts      Physical Activity History:   Physical activity: none  Obstacles to activity: orthopedic issues in the past 10 years    Diabetes Medications:   Knows name and dose of prescribed medications Yes  Knows prescribed schedule for medicationsYes  Recent change in medication type/dosage: No  Stores  medications properlyYes  Comments:     Monitoring:   Has BG meter: No  Testing frequency: not testng  Recent results: none    Barriers:   -limited mobility due to joint issues          Follow Up Plan: July    Referring Provider: Macrina Gomez MD    Time spent with patient: 60 minutes

## 2019-06-13 DIAGNOSIS — E78.00 PURE HYPERCHOLESTEROLEMIA: Primary | ICD-10-CM

## 2019-06-13 RX ORDER — ATORVASTATIN CALCIUM 10 MG/1
10 TABLET, FILM COATED ORAL DAILY
Qty: 30 TABLET | Refills: 2 | Status: SHIPPED | OUTPATIENT
Start: 2019-06-13 | End: 2019-08-19 | Stop reason: SDUPTHER

## 2019-06-18 ENCOUNTER — HOSPITAL ENCOUNTER (OUTPATIENT)
Dept: MAMMOGRAPHY | Age: 69
Discharge: HOME | End: 2019-06-18
Payer: MEDICARE

## 2019-06-18 ENCOUNTER — HOSPITAL ENCOUNTER (OUTPATIENT)
Dept: GENERAL RADIOLOGY | Age: 69
Discharge: HOME | End: 2019-06-18
Payer: MEDICARE

## 2019-06-18 DIAGNOSIS — M81.0 OSTEOPOROSIS WITHOUT CURRENT PATHOLOGICAL FRACTURE, UNSPECIFIED OSTEOPOROSIS TYPE: ICD-10-CM

## 2019-06-18 DIAGNOSIS — Z78.0 POST-MENOPAUSAL: ICD-10-CM

## 2019-06-18 DIAGNOSIS — Z12.31 ENCOUNTER FOR SCREENING MAMMOGRAM FOR BREAST CANCER: ICD-10-CM

## 2019-06-18 PROCEDURE — 77080 DXA BONE DENSITY AXIAL: CPT

## 2019-06-18 PROCEDURE — 77063 BREAST TOMOSYNTHESIS BI: CPT

## 2019-07-01 ENCOUNTER — OFFICE VISIT (OUTPATIENT)
Dept: INTERNAL MEDICINE CLINIC | Age: 69
End: 2019-07-01

## 2019-07-01 DIAGNOSIS — E11.9 TYPE 2 DIABETES MELLITUS WITHOUT COMPLICATION, WITHOUT LONG-TERM CURRENT USE OF INSULIN (HCC): ICD-10-CM

## 2019-07-01 DIAGNOSIS — E11.9 TYPE 2 DIABETES MELLITUS WITHOUT COMPLICATION, WITHOUT LONG-TERM CURRENT USE OF INSULIN (HCC): Primary | ICD-10-CM

## 2019-07-01 NOTE — PROGRESS NOTES
results found for: CHOLHDLRATIO    No results found for: WBC, HGB, HCT, MCV, PLT    Lab Results   Component Value Date    CREATININE <0.5 (L) 11/19/2018    BUN 19 11/19/2018     11/19/2018    K 4.6 11/19/2018     11/19/2018    CO2 28 11/19/2018       Anthropometric Measurements: Wt Change since last visit:  Yes  Comments: down to 263 lb    Food and Nutrition Changes:   Beverage consumption:changes: No  Patient reported changes:  Yes--paying attention to hunger/fullness and stopping eating before food is goine     Physical Activity Changes:   Increased frequency, intensity or length of time? No  Obstacles to activity: knee and hip pain    Diabetes Medications:   Recent change in medication type/dosage: No      Monitoring:   Changes to testing regimen?  No  Recent results: not testing    Barriers:   -restricted activity due to pain        Follow Up Plan: three months    Referring Provider: Shahla Ayala MD    Time spent with patient: 30 minutes

## 2019-07-02 LAB
ESTIMATED AVERAGE GLUCOSE: 137 MG/DL
HBA1C MFR BLD: 6.4 %

## 2019-07-08 ENCOUNTER — OFFICE VISIT (OUTPATIENT)
Dept: INTERNAL MEDICINE CLINIC | Age: 69
End: 2019-07-08
Payer: MEDICARE

## 2019-07-08 VITALS
HEIGHT: 69 IN | HEART RATE: 64 BPM | OXYGEN SATURATION: 98 % | BODY MASS INDEX: 38.66 KG/M2 | DIASTOLIC BLOOD PRESSURE: 80 MMHG | WEIGHT: 261 LBS | SYSTOLIC BLOOD PRESSURE: 124 MMHG

## 2019-07-08 DIAGNOSIS — E11.9 TYPE 2 DIABETES MELLITUS WITHOUT COMPLICATION, WITHOUT LONG-TERM CURRENT USE OF INSULIN (HCC): Primary | ICD-10-CM

## 2019-07-08 DIAGNOSIS — M16.11 ARTHRITIS OF RIGHT HIP: ICD-10-CM

## 2019-07-08 DIAGNOSIS — F33.41 RECURRENT MAJOR DEPRESSIVE DISORDER, IN PARTIAL REMISSION (HCC): ICD-10-CM

## 2019-07-08 DIAGNOSIS — J30.9 ALLERGIC RHINITIS, UNSPECIFIED SEASONALITY, UNSPECIFIED TRIGGER: ICD-10-CM

## 2019-07-08 DIAGNOSIS — E03.8 OTHER SPECIFIED HYPOTHYROIDISM: ICD-10-CM

## 2019-07-08 DIAGNOSIS — E66.01 CLASS 2 SEVERE OBESITY DUE TO EXCESS CALORIES WITH SERIOUS COMORBIDITY AND BODY MASS INDEX (BMI) OF 38.0 TO 38.9 IN ADULT (HCC): ICD-10-CM

## 2019-07-08 DIAGNOSIS — M79.672 INTRACTABLE LEFT HEEL PAIN: ICD-10-CM

## 2019-07-08 DIAGNOSIS — Z11.59 ENCOUNTER FOR HEPATITIS C SCREENING TEST FOR LOW RISK PATIENT: ICD-10-CM

## 2019-07-08 DIAGNOSIS — M15.9 PRIMARY OSTEOARTHRITIS INVOLVING MULTIPLE JOINTS: ICD-10-CM

## 2019-07-08 PROCEDURE — 4040F PNEUMOC VAC/ADMIN/RCVD: CPT | Performed by: INTERNAL MEDICINE

## 2019-07-08 PROCEDURE — 1123F ACP DISCUSS/DSCN MKR DOCD: CPT | Performed by: INTERNAL MEDICINE

## 2019-07-08 PROCEDURE — G8417 CALC BMI ABV UP PARAM F/U: HCPCS | Performed by: INTERNAL MEDICINE

## 2019-07-08 PROCEDURE — 3017F COLORECTAL CA SCREEN DOC REV: CPT | Performed by: INTERNAL MEDICINE

## 2019-07-08 PROCEDURE — 1090F PRES/ABSN URINE INCON ASSESS: CPT | Performed by: INTERNAL MEDICINE

## 2019-07-08 PROCEDURE — 1036F TOBACCO NON-USER: CPT | Performed by: INTERNAL MEDICINE

## 2019-07-08 PROCEDURE — 3044F HG A1C LEVEL LT 7.0%: CPT | Performed by: INTERNAL MEDICINE

## 2019-07-08 PROCEDURE — 99214 OFFICE O/P EST MOD 30 MIN: CPT | Performed by: INTERNAL MEDICINE

## 2019-07-08 PROCEDURE — G8399 PT W/DXA RESULTS DOCUMENT: HCPCS | Performed by: INTERNAL MEDICINE

## 2019-07-08 PROCEDURE — G8427 DOCREV CUR MEDS BY ELIG CLIN: HCPCS | Performed by: INTERNAL MEDICINE

## 2019-07-08 PROCEDURE — 2022F DILAT RTA XM EVC RTNOPTHY: CPT | Performed by: INTERNAL MEDICINE

## 2019-07-08 ASSESSMENT — ENCOUNTER SYMPTOMS
BACK PAIN: 0
WHEEZING: 0
COLOR CHANGE: 0
ABDOMINAL PAIN: 0
CHEST TIGHTNESS: 0

## 2019-07-08 NOTE — PROGRESS NOTES
7/7/2020    Microalbumin / Creatinine Urine Ratio     Standing Status:   Future     Standing Expiration Date:   7/7/2020

## 2019-07-17 ENCOUNTER — TELEPHONE (OUTPATIENT)
Dept: INTERNAL MEDICINE CLINIC | Age: 69
End: 2019-07-17

## 2019-07-17 RX ORDER — RANITIDINE 150 MG/1
150 CAPSULE ORAL 2 TIMES DAILY
Qty: 270 CAPSULE | Refills: 3 | Status: SHIPPED | OUTPATIENT
Start: 2019-07-17 | End: 2019-07-29 | Stop reason: ALTCHOICE

## 2019-07-29 RX ORDER — RANITIDINE 150 MG/1
150 TABLET ORAL DAILY
Qty: 90 TABLET | Refills: 1 | Status: SHIPPED | OUTPATIENT
Start: 2019-07-29 | End: 2019-11-20

## 2019-08-19 DIAGNOSIS — E78.00 PURE HYPERCHOLESTEROLEMIA: ICD-10-CM

## 2019-08-20 RX ORDER — ATORVASTATIN CALCIUM 10 MG/1
TABLET, FILM COATED ORAL
Qty: 90 TABLET | Refills: 2 | Status: SHIPPED | OUTPATIENT
Start: 2019-08-20 | End: 2019-11-20 | Stop reason: SDUPTHER

## 2019-08-28 RX ORDER — METOPROLOL SUCCINATE 100 MG/1
TABLET, EXTENDED RELEASE ORAL
Qty: 90 TABLET | Refills: 3 | Status: SHIPPED | OUTPATIENT
Start: 2019-08-28 | End: 2020-02-10 | Stop reason: SDUPTHER

## 2019-09-26 RX ORDER — MELOXICAM 15 MG/1
15 TABLET ORAL DAILY
Qty: 90 TABLET | Refills: 3 | Status: SHIPPED | OUTPATIENT
Start: 2019-09-26 | End: 2020-02-10 | Stop reason: SDUPTHER

## 2019-09-26 NOTE — TELEPHONE ENCOUNTER
Spoke with patient /mobic rx send to [facility], [facility] today/ message given below   AWV APPT.  ADVISED

## 2019-10-10 RX ORDER — METFORMIN HYDROCHLORIDE 500 MG/1
500 TABLET, EXTENDED RELEASE ORAL 2 TIMES DAILY
Qty: 180 TABLET | Refills: 3 | Status: SHIPPED | OUTPATIENT
Start: 2019-10-10 | End: 2020-02-10 | Stop reason: SDUPTHER

## 2019-11-12 RX ORDER — OLMESARTAN MEDOXOMIL AND HYDROCHLOROTHIAZIDE 40/12.5 40; 12.5 MG/1; MG/1
1 TABLET ORAL DAILY
Qty: 90 TABLET | Refills: 3 | Status: SHIPPED | OUTPATIENT
Start: 2019-11-12 | End: 2020-02-10 | Stop reason: SDUPTHER

## 2019-11-14 DIAGNOSIS — E11.9 TYPE 2 DIABETES MELLITUS WITHOUT COMPLICATION, WITHOUT LONG-TERM CURRENT USE OF INSULIN (HCC): ICD-10-CM

## 2019-11-14 DIAGNOSIS — J30.9 ALLERGIC RHINITIS, UNSPECIFIED SEASONALITY, UNSPECIFIED TRIGGER: ICD-10-CM

## 2019-11-14 DIAGNOSIS — F33.41 RECURRENT MAJOR DEPRESSIVE DISORDER, IN PARTIAL REMISSION (HCC): ICD-10-CM

## 2019-11-14 DIAGNOSIS — E03.8 OTHER SPECIFIED HYPOTHYROIDISM: ICD-10-CM

## 2019-11-14 DIAGNOSIS — Z11.59 ENCOUNTER FOR HEPATITIS C SCREENING TEST FOR LOW RISK PATIENT: ICD-10-CM

## 2019-11-14 LAB
A/G RATIO: 1.6 (ref 1.1–2.2)
ALBUMIN SERPL-MCNC: 4.4 G/DL (ref 3.4–5)
ALP BLD-CCNC: 90 U/L (ref 40–129)
ALT SERPL-CCNC: 17 U/L (ref 10–40)
ANION GAP SERPL CALCULATED.3IONS-SCNC: 15 MMOL/L (ref 3–16)
AST SERPL-CCNC: 12 U/L (ref 15–37)
BASOPHILS ABSOLUTE: 0 K/UL (ref 0–0.2)
BASOPHILS RELATIVE PERCENT: 0.4 %
BILIRUB SERPL-MCNC: 0.3 MG/DL (ref 0–1)
BUN BLDV-MCNC: 14 MG/DL (ref 7–20)
CALCIUM SERPL-MCNC: 9.8 MG/DL (ref 8.3–10.6)
CHLORIDE BLD-SCNC: 100 MMOL/L (ref 99–110)
CO2: 27 MMOL/L (ref 21–32)
CREAT SERPL-MCNC: 0.5 MG/DL (ref 0.6–1.2)
EOSINOPHILS ABSOLUTE: 0.2 K/UL (ref 0–0.6)
EOSINOPHILS RELATIVE PERCENT: 2.6 %
GFR AFRICAN AMERICAN: >60
GFR NON-AFRICAN AMERICAN: >60
GLOBULIN: 2.7 G/DL
GLUCOSE BLD-MCNC: 116 MG/DL (ref 70–99)
HCT VFR BLD CALC: 40.8 % (ref 36–48)
HEMOGLOBIN: 13.2 G/DL (ref 12–16)
HEPATITIS C ANTIBODY INTERPRETATION: NORMAL
LYMPHOCYTES ABSOLUTE: 1.5 K/UL (ref 1–5.1)
LYMPHOCYTES RELATIVE PERCENT: 21.3 %
MCH RBC QN AUTO: 30 PG (ref 26–34)
MCHC RBC AUTO-ENTMCNC: 32.3 G/DL (ref 31–36)
MCV RBC AUTO: 92.9 FL (ref 80–100)
MONOCYTES ABSOLUTE: 0.4 K/UL (ref 0–1.3)
MONOCYTES RELATIVE PERCENT: 6.5 %
NEUTROPHILS ABSOLUTE: 4.7 K/UL (ref 1.7–7.7)
NEUTROPHILS RELATIVE PERCENT: 69.2 %
PDW BLD-RTO: 14.5 % (ref 12.4–15.4)
PLATELET # BLD: 336 K/UL (ref 135–450)
PMV BLD AUTO: 7.9 FL (ref 5–10.5)
POTASSIUM SERPL-SCNC: 4.3 MMOL/L (ref 3.5–5.1)
RBC # BLD: 4.4 M/UL (ref 4–5.2)
SODIUM BLD-SCNC: 142 MMOL/L (ref 136–145)
TOTAL PROTEIN: 7.1 G/DL (ref 6.4–8.2)
TSH SERPL DL<=0.05 MIU/L-ACNC: 2.73 UIU/ML (ref 0.27–4.2)
WBC # BLD: 6.8 K/UL (ref 4–11)

## 2019-11-15 LAB
CREATININE URINE: 86.5 MG/DL (ref 28–259)
ESTIMATED AVERAGE GLUCOSE: 125.5 MG/DL
HBA1C MFR BLD: 6 %
MICROALBUMIN UR-MCNC: <1.2 MG/DL
MICROALBUMIN/CREAT UR-RTO: NORMAL MG/G (ref 0–30)

## 2019-11-20 ENCOUNTER — OFFICE VISIT (OUTPATIENT)
Dept: INTERNAL MEDICINE CLINIC | Age: 69
End: 2019-11-20
Payer: MEDICARE

## 2019-11-20 VITALS
WEIGHT: 256 LBS | DIASTOLIC BLOOD PRESSURE: 80 MMHG | SYSTOLIC BLOOD PRESSURE: 138 MMHG | HEIGHT: 69 IN | BODY MASS INDEX: 37.92 KG/M2

## 2019-11-20 DIAGNOSIS — G47.33 OBSTRUCTIVE SLEEP APNEA SYNDROME: ICD-10-CM

## 2019-11-20 DIAGNOSIS — H35.30 MACULAR DEGENERATION OF BOTH EYES, UNSPECIFIED TYPE: ICD-10-CM

## 2019-11-20 DIAGNOSIS — M15.9 PRIMARY OSTEOARTHRITIS INVOLVING MULTIPLE JOINTS: ICD-10-CM

## 2019-11-20 DIAGNOSIS — E11.9 TYPE 2 DIABETES MELLITUS WITHOUT COMPLICATION, WITHOUT LONG-TERM CURRENT USE OF INSULIN (HCC): ICD-10-CM

## 2019-11-20 DIAGNOSIS — E66.01 CLASS 2 SEVERE OBESITY DUE TO EXCESS CALORIES WITH SERIOUS COMORBIDITY AND BODY MASS INDEX (BMI) OF 38.0 TO 38.9 IN ADULT (HCC): ICD-10-CM

## 2019-11-20 DIAGNOSIS — E03.8 OTHER SPECIFIED HYPOTHYROIDISM: ICD-10-CM

## 2019-11-20 DIAGNOSIS — H91.93 BILATERAL HEARING LOSS, UNSPECIFIED HEARING LOSS TYPE: ICD-10-CM

## 2019-11-20 DIAGNOSIS — E78.00 PURE HYPERCHOLESTEROLEMIA: ICD-10-CM

## 2019-11-20 DIAGNOSIS — I30.0 IDIOPATHIC PERICARDITIS, UNSPECIFIED CHRONICITY: ICD-10-CM

## 2019-11-20 DIAGNOSIS — F33.41 RECURRENT MAJOR DEPRESSIVE DISORDER, IN PARTIAL REMISSION (HCC): ICD-10-CM

## 2019-11-20 DIAGNOSIS — Z00.00 WELL ADULT EXAM: Primary | ICD-10-CM

## 2019-11-20 PROBLEM — H91.90 HEARING LOSS: Status: ACTIVE | Noted: 2019-11-20

## 2019-11-20 PROCEDURE — G0439 PPPS, SUBSEQ VISIT: HCPCS | Performed by: INTERNAL MEDICINE

## 2019-11-20 PROCEDURE — 3017F COLORECTAL CA SCREEN DOC REV: CPT | Performed by: INTERNAL MEDICINE

## 2019-11-20 PROCEDURE — G8482 FLU IMMUNIZE ORDER/ADMIN: HCPCS | Performed by: INTERNAL MEDICINE

## 2019-11-20 PROCEDURE — G8417 CALC BMI ABV UP PARAM F/U: HCPCS | Performed by: INTERNAL MEDICINE

## 2019-11-20 PROCEDURE — 2022F DILAT RTA XM EVC RTNOPTHY: CPT | Performed by: INTERNAL MEDICINE

## 2019-11-20 PROCEDURE — 99214 OFFICE O/P EST MOD 30 MIN: CPT | Performed by: INTERNAL MEDICINE

## 2019-11-20 PROCEDURE — 4040F PNEUMOC VAC/ADMIN/RCVD: CPT | Performed by: INTERNAL MEDICINE

## 2019-11-20 PROCEDURE — 1090F PRES/ABSN URINE INCON ASSESS: CPT | Performed by: INTERNAL MEDICINE

## 2019-11-20 PROCEDURE — G8399 PT W/DXA RESULTS DOCUMENT: HCPCS | Performed by: INTERNAL MEDICINE

## 2019-11-20 PROCEDURE — G8427 DOCREV CUR MEDS BY ELIG CLIN: HCPCS | Performed by: INTERNAL MEDICINE

## 2019-11-20 PROCEDURE — 1036F TOBACCO NON-USER: CPT | Performed by: INTERNAL MEDICINE

## 2019-11-20 PROCEDURE — 3044F HG A1C LEVEL LT 7.0%: CPT | Performed by: INTERNAL MEDICINE

## 2019-11-20 PROCEDURE — 1123F ACP DISCUSS/DSCN MKR DOCD: CPT | Performed by: INTERNAL MEDICINE

## 2019-11-20 RX ORDER — ATORVASTATIN CALCIUM 40 MG/1
TABLET, FILM COATED ORAL
Qty: 90 TABLET | Refills: 3 | Status: SHIPPED | OUTPATIENT
Start: 2019-11-20 | End: 2020-02-10 | Stop reason: SDUPTHER

## 2019-11-20 ASSESSMENT — PATIENT HEALTH QUESTIONNAIRE - PHQ9
SUM OF ALL RESPONSES TO PHQ QUESTIONS 1-9: 2
SUM OF ALL RESPONSES TO PHQ QUESTIONS 1-9: 2

## 2019-11-20 ASSESSMENT — ENCOUNTER SYMPTOMS
BACK PAIN: 0
WHEEZING: 0
COLOR CHANGE: 0
ABDOMINAL PAIN: 0
CHEST TIGHTNESS: 0

## 2019-11-25 ENCOUNTER — TELEPHONE (OUTPATIENT)
Dept: INTERNAL MEDICINE CLINIC | Age: 69
End: 2019-11-25

## 2019-12-05 ENCOUNTER — OFFICE VISIT (OUTPATIENT)
Dept: INTERNAL MEDICINE CLINIC | Age: 69
End: 2019-12-05

## 2019-12-05 DIAGNOSIS — E66.01 CLASS 2 SEVERE OBESITY DUE TO EXCESS CALORIES WITH SERIOUS COMORBIDITY AND BODY MASS INDEX (BMI) OF 38.0 TO 38.9 IN ADULT (HCC): ICD-10-CM

## 2019-12-05 DIAGNOSIS — E11.9 TYPE 2 DIABETES MELLITUS WITHOUT COMPLICATION, WITHOUT LONG-TERM CURRENT USE OF INSULIN (HCC): Primary | ICD-10-CM

## 2019-12-10 ENCOUNTER — OFFICE VISIT (OUTPATIENT)
Dept: PSYCHOLOGY | Age: 69
End: 2019-12-10
Payer: MEDICARE

## 2019-12-10 DIAGNOSIS — F43.23 ADJUSTMENT DISORDER WITH MIXED ANXIETY AND DEPRESSED MOOD: Primary | ICD-10-CM

## 2019-12-10 PROCEDURE — 90791 PSYCH DIAGNOSTIC EVALUATION: CPT | Performed by: PSYCHOLOGIST

## 2019-12-10 ASSESSMENT — PATIENT HEALTH QUESTIONNAIRE - PHQ9
6. FEELING BAD ABOUT YOURSELF - OR THAT YOU ARE A FAILURE OR HAVE LET YOURSELF OR YOUR FAMILY DOWN: 2
8. MOVING OR SPEAKING SO SLOWLY THAT OTHER PEOPLE COULD HAVE NOTICED. OR THE OPPOSITE, BEING SO FIGETY OR RESTLESS THAT YOU HAVE BEEN MOVING AROUND A LOT MORE THAN USUAL: 1
SUM OF ALL RESPONSES TO PHQ9 QUESTIONS 1 & 2: 4
SUM OF ALL RESPONSES TO PHQ QUESTIONS 1-9: 11
1. LITTLE INTEREST OR PLEASURE IN DOING THINGS: 2
2. FEELING DOWN, DEPRESSED OR HOPELESS: 2
SUM OF ALL RESPONSES TO PHQ QUESTIONS 1-9: 11
4. FEELING TIRED OR HAVING LITTLE ENERGY: 2
3. TROUBLE FALLING OR STAYING ASLEEP: 1
9. THOUGHTS THAT YOU WOULD BE BETTER OFF DEAD, OR OF HURTING YOURSELF: 0
7. TROUBLE CONCENTRATING ON THINGS, SUCH AS READING THE NEWSPAPER OR WATCHING TELEVISION: 1

## 2019-12-10 ASSESSMENT — ANXIETY QUESTIONNAIRES
2. NOT BEING ABLE TO STOP OR CONTROL WORRYING: 1-SEVERAL DAYS
3. WORRYING TOO MUCH ABOUT DIFFERENT THINGS: 2-OVER HALF THE DAYS
5. BEING SO RESTLESS THAT IT IS HARD TO SIT STILL: 0-NOT AT ALL
7. FEELING AFRAID AS IF SOMETHING AWFUL MIGHT HAPPEN: 1-SEVERAL DAYS
4. TROUBLE RELAXING: 1-SEVERAL DAYS
1. FEELING NERVOUS, ANXIOUS, OR ON EDGE: 2-OVER HALF THE DAYS
6. BECOMING EASILY ANNOYED OR IRRITABLE: 2-OVER HALF THE DAYS
GAD7 TOTAL SCORE: 9

## 2019-12-16 RX ORDER — OMEPRAZOLE 40 MG/1
40 CAPSULE, DELAYED RELEASE ORAL DAILY
Qty: 90 CAPSULE | Refills: 3 | Status: SHIPPED | OUTPATIENT
Start: 2019-12-16 | End: 2020-02-10 | Stop reason: SDUPTHER

## 2019-12-18 RX ORDER — BUPROPION HYDROCHLORIDE 150 MG/1
TABLET ORAL
Qty: 90 TABLET | Refills: 2 | Status: SHIPPED | OUTPATIENT
Start: 2019-12-18 | End: 2020-02-10 | Stop reason: SDUPTHER

## 2020-01-02 ENCOUNTER — OFFICE VISIT (OUTPATIENT)
Dept: PSYCHOLOGY | Age: 70
End: 2020-01-02
Payer: MEDICARE

## 2020-01-02 PROCEDURE — 90832 PSYTX W PT 30 MINUTES: CPT | Performed by: PSYCHOLOGIST

## 2020-01-02 ASSESSMENT — ANXIETY QUESTIONNAIRES
1. FEELING NERVOUS, ANXIOUS, OR ON EDGE: 1-SEVERAL DAYS
4. TROUBLE RELAXING: 0-NOT AT ALL
7. FEELING AFRAID AS IF SOMETHING AWFUL MIGHT HAPPEN: 1-SEVERAL DAYS
2. NOT BEING ABLE TO STOP OR CONTROL WORRYING: 1-SEVERAL DAYS
5. BEING SO RESTLESS THAT IT IS HARD TO SIT STILL: 0-NOT AT ALL
6. BECOMING EASILY ANNOYED OR IRRITABLE: 2-OVER HALF THE DAYS
GAD7 TOTAL SCORE: 6
3. WORRYING TOO MUCH ABOUT DIFFERENT THINGS: 1-SEVERAL DAYS

## 2020-01-02 ASSESSMENT — PATIENT HEALTH QUESTIONNAIRE - PHQ9
SUM OF ALL RESPONSES TO PHQ QUESTIONS 1-9: 2
1. LITTLE INTEREST OR PLEASURE IN DOING THINGS: 1
SUM OF ALL RESPONSES TO PHQ9 QUESTIONS 1 & 2: 2
SUM OF ALL RESPONSES TO PHQ QUESTIONS 1-9: 2
2. FEELING DOWN, DEPRESSED OR HOPELESS: 1

## 2020-01-02 NOTE — PROGRESS NOTES
Behavioral Health Consultation  [address] Noyola PsyD  Psychologist  1/2/2020   11:04 AM       Time spent with Patient: 16 minutes  This is patient's second [facility] appointment. Reason for Consult:  depression  Referring Provider: Jacque De La Garza MD       S:  During the last visit pt set goals to 1) consider which treatment target you'd like to focus on first       Has thought more about treatment model and thinks she may prefer to do traditional therapy. Wants to really explore many past dynamics with  and figure out what she is doing in this next chapter of her life. Mood has been \"ok\" since last visit.        O:  MSE:    Appearance: good hygiene   Attitude: cooperative and friendly  Consciousness: alert  Orientation: oriented to person, place, time, general circumstance  Memory: recent and remote memory intact  Attention/Concentration: intact during session  Psychomotor Activity:normal  Eye Contact: normal  Speech: normal rate and volume, well-articulated  Mood: \"ok\"  Affect: euthymic and congruent   Perception: within normal limits  Thought Content: within normal limits  Thought Process: logical, coherent and goal-directed  Insight: good  Judgment: intact  Ability to understand instructions: Yes  Ability to respond meaningfully: Yes  Morbid Ideation: no   Suicide Assessment: no suicidal ideation, plan, or intent  Homicidal Ideation: no      History:    Medications:   Current Outpatient Medications   Medication Sig Dispense Refill    buPROPion (WELLBUTRIN XL) 150 MG extended release tablet TAKE 1 TABLET EVERY MORNING 90 tablet 2    omeprazole (PRILOSEC) 40 MG delayed release capsule Take 1 capsule by mouth daily 90 capsule 3    atorvastatin (LIPITOR) 40 MG tablet TAKE 1 TABLET EVERY DAY 90 tablet 3    olmesartan-hydrochlorothiazide (BENICAR HCT) 40-12.5 MG per tablet Take 1 tablet by mouth daily 90 tablet 3    metFORMIN (GLUCOPHAGE-XR) 500 MG extended release tablet Take 1 tablet by mouth 2 times daily 180 tablet 3    meloxicam (MOBIC) 15 MG tablet Take 1 tablet by mouth daily 90 tablet 3    metoprolol succinate (TOPROL XL) 100 MG extended release tablet TAKE 1 TABLET EVERY DAY 90 tablet 3    diltiazem (CARDIZEM CD) 120 MG extended release capsule Take 1 capsule by mouth daily 30 capsule 0    aspirin 81 MG tablet Take 81 mg by mouth daily      fluticasone (FLONASE) 50 MCG/ACT nasal spray 1 spray by Each Nare route daily      levothyroxine (SYNTHROID) 25 MCG tablet Take 25 mcg by mouth Daily       No current facility-administered medications for this visit.         Social History:   Social History     Socioeconomic History    Marital status:      Spouse name: Not on file    Number of children: Not on file    Years of education: Not on file    Highest education level: Not on file   Occupational History    Not on file   Social Needs    Financial resource strain: Not on file    Food insecurity:     Worry: Not on file     Inability: Not on file    Transportation needs:     Medical: Not on file     Non-medical: Not on file   Tobacco Use    Smoking status: Never Smoker    Smokeless tobacco: Never Used   Substance and Sexual Activity    Alcohol use: Not on file    Drug use: Not on file    Sexual activity: Not on file   Lifestyle    Physical activity:     Days per week: Not on file     Minutes per session: Not on file    Stress: Not on file   Relationships    Social connections:     Talks on phone: Not on file     Gets together: Not on file     Attends Shinto service: Not on file     Active member of club or organization: Not on file     Attends meetings of clubs or organizations: Not on file     Relationship status: Not on file    Intimate partner violence:     Fear of current or ex partner: Not on file     Emotionally abused: Not on file     Physically abused: Not on file     Forced sexual activity: Not on file   Other Topics Concern    Not on file   Social History Narrative  Not on file       TOBACCO:   reports that she has never smoked. She has never used smokeless tobacco.  ETOH:   has no history on file for alcohol. Family History:   No family history on file. A:  Ms. Mindy Rodarte continues to experience anxiety and depression secondary to various life stressors. She was talkative and engaged. She decided that she would infact like to return to traditional therapy. Referrals were provided. DEBRA 7 SCORE 1/2/2020 12/10/2019   DEBRA-7 Total Score 6 9     Interpretation of DEBRA-7 score: 5-9 = mild anxiety, 10-14 = moderate anxiety, 15+ = severe anxiety. Recommend referral to behavioral health for scores 10 or greater. PHQ Scores 1/2/2020 12/10/2019 11/20/2019 4/8/2019 9/25/2018   PHQ2 Score 2 4 2 1 1   PHQ9 Score 2 11 2 1 1     Interpretation of Total Score Depression Severity: 1-4 = Minimal depression, 5-9 = Mild depression, 10-14 = Moderate depression, 15-19 = Moderately severe depression, 20-27 = Severe depression      Diagnosis:    No diagnosis found. Plan:  Pt interventions:    [place]-setting to identify pt's primary goals for [facility] visit / overall health, Supportive techniques, discussed specialty mental health and provided referrals and treatment planning    Pt Behavioral Change Plan:  Pt set goals to use referral list for specialty mental health.

## 2020-01-10 ENCOUNTER — TELEPHONE (OUTPATIENT)
Dept: INTERNAL MEDICINE CLINIC | Age: 70
End: 2020-01-10

## 2020-02-10 ENCOUNTER — TELEPHONE (OUTPATIENT)
Dept: INTERNAL MEDICINE CLINIC | Age: 70
End: 2020-02-10

## 2020-02-10 RX ORDER — MELOXICAM 15 MG/1
15 TABLET ORAL DAILY
Qty: 90 TABLET | Refills: 3 | Status: SHIPPED | OUTPATIENT
Start: 2020-02-10 | End: 2020-12-01 | Stop reason: SDUPTHER

## 2020-02-10 RX ORDER — LEVOTHYROXINE SODIUM 0.03 MG/1
25 TABLET ORAL DAILY
Qty: 90 TABLET | Refills: 3 | Status: SHIPPED | OUTPATIENT
Start: 2020-02-10 | End: 2020-12-01 | Stop reason: SDUPTHER

## 2020-02-10 RX ORDER — BUPROPION HYDROCHLORIDE 150 MG/1
TABLET ORAL
Qty: 90 TABLET | Refills: 3 | Status: SHIPPED | OUTPATIENT
Start: 2020-02-10 | End: 2020-12-01 | Stop reason: SDUPTHER

## 2020-02-10 RX ORDER — ATORVASTATIN CALCIUM 40 MG/1
TABLET, FILM COATED ORAL
Qty: 90 TABLET | Refills: 3 | Status: SHIPPED | OUTPATIENT
Start: 2020-02-10 | End: 2020-12-01 | Stop reason: SDUPTHER

## 2020-02-10 RX ORDER — METFORMIN HYDROCHLORIDE 500 MG/1
500 TABLET, EXTENDED RELEASE ORAL 2 TIMES DAILY
Qty: 180 TABLET | Refills: 3 | Status: SHIPPED | OUTPATIENT
Start: 2020-02-10 | End: 2020-12-01 | Stop reason: SDUPTHER

## 2020-02-10 RX ORDER — OMEPRAZOLE 40 MG/1
40 CAPSULE, DELAYED RELEASE ORAL DAILY
Qty: 90 CAPSULE | Refills: 3 | Status: SHIPPED | OUTPATIENT
Start: 2020-02-10 | End: 2020-12-01 | Stop reason: SDUPTHER

## 2020-02-10 RX ORDER — DILTIAZEM HYDROCHLORIDE 120 MG/1
120 CAPSULE, COATED, EXTENDED RELEASE ORAL DAILY
Qty: 90 CAPSULE | Refills: 3 | Status: SHIPPED | OUTPATIENT
Start: 2020-02-10 | End: 2020-12-01 | Stop reason: SDUPTHER

## 2020-02-10 RX ORDER — METOPROLOL SUCCINATE 100 MG/1
TABLET, EXTENDED RELEASE ORAL
Qty: 90 TABLET | Refills: 3 | Status: SHIPPED | OUTPATIENT
Start: 2020-02-10 | End: 2020-12-01 | Stop reason: SDUPTHER

## 2020-02-10 RX ORDER — OLMESARTAN MEDOXOMIL AND HYDROCHLOROTHIAZIDE 40/12.5 40; 12.5 MG/1; MG/1
1 TABLET ORAL DAILY
Qty: 90 TABLET | Refills: 3 | Status: SHIPPED | OUTPATIENT
Start: 2020-02-10 | End: 2020-12-01 | Stop reason: SDUPTHER

## 2020-02-10 NOTE — TELEPHONE ENCOUNTER
Her pulmonologist or sleep MD needs to order- we have no idea what she needs and are not sleep specialists

## 2020-02-10 NOTE — TELEPHONE ENCOUNTER
Pt calling to get new scripts sent to [facility]     atorvastatin (LIPITOR) 40 MG tablet    buPROPion (WELLBUTRIN XL) 150 MG extended release     diltiazem (CARDIZEM CD) 120 MG extended release capsule    levothyroxine (SYNTHROID) 25 MCG tablet (Completely out needs asap)      meloxicam (MOBIC) 15 MG tablet     metFORMIN (GLUCOPHAGE-XR) 500 MG extended release    metoprolol succinate (TOPROL XL) 100 MG extended release     hydrochlorothiazide (BENICAR HCT) 40-12.5 MG per tablet     omeprazole (PRILOSEC) 40 MG delayed release capsule       Please fax to [phone]

## 2020-05-19 ENCOUNTER — VIRTUAL VISIT (OUTPATIENT)
Dept: INTERNAL MEDICINE CLINIC | Age: 70
End: 2020-05-19
Payer: MEDICARE

## 2020-05-19 VITALS
WEIGHT: 261 LBS | DIASTOLIC BLOOD PRESSURE: 81 MMHG | SYSTOLIC BLOOD PRESSURE: 138 MMHG | HEART RATE: 65 BPM | TEMPERATURE: 97.7 F | BODY MASS INDEX: 38.54 KG/M2

## 2020-05-19 PROCEDURE — 1123F ACP DISCUSS/DSCN MKR DOCD: CPT | Performed by: INTERNAL MEDICINE

## 2020-05-19 PROCEDURE — 99214 OFFICE O/P EST MOD 30 MIN: CPT | Performed by: INTERNAL MEDICINE

## 2020-05-19 PROCEDURE — 3017F COLORECTAL CA SCREEN DOC REV: CPT | Performed by: INTERNAL MEDICINE

## 2020-05-19 PROCEDURE — G8427 DOCREV CUR MEDS BY ELIG CLIN: HCPCS | Performed by: INTERNAL MEDICINE

## 2020-05-19 PROCEDURE — 1090F PRES/ABSN URINE INCON ASSESS: CPT | Performed by: INTERNAL MEDICINE

## 2020-05-19 PROCEDURE — 4040F PNEUMOC VAC/ADMIN/RCVD: CPT | Performed by: INTERNAL MEDICINE

## 2020-05-19 PROCEDURE — 3046F HEMOGLOBIN A1C LEVEL >9.0%: CPT | Performed by: INTERNAL MEDICINE

## 2020-05-19 PROCEDURE — G8399 PT W/DXA RESULTS DOCUMENT: HCPCS | Performed by: INTERNAL MEDICINE

## 2020-05-19 PROCEDURE — 2022F DILAT RTA XM EVC RTNOPTHY: CPT | Performed by: INTERNAL MEDICINE

## 2020-05-19 SDOH — ECONOMIC STABILITY: FOOD INSECURITY: WITHIN THE PAST 12 MONTHS, THE FOOD YOU BOUGHT JUST DIDN'T LAST AND YOU DIDN'T HAVE MONEY TO GET MORE.: NEVER TRUE

## 2020-05-19 SDOH — ECONOMIC STABILITY: FOOD INSECURITY: WITHIN THE PAST 12 MONTHS, YOU WORRIED THAT YOUR FOOD WOULD RUN OUT BEFORE YOU GOT MONEY TO BUY MORE.: NEVER TRUE

## 2020-05-19 SDOH — ECONOMIC STABILITY: TRANSPORTATION INSECURITY
IN THE PAST 12 MONTHS, HAS THE LACK OF TRANSPORTATION KEPT YOU FROM MEDICAL APPOINTMENTS OR FROM GETTING MEDICATIONS?: NO

## 2020-05-19 SDOH — ECONOMIC STABILITY: TRANSPORTATION INSECURITY
IN THE PAST 12 MONTHS, HAS LACK OF TRANSPORTATION KEPT YOU FROM MEETINGS, WORK, OR FROM GETTING THINGS NEEDED FOR DAILY LIVING?: NO

## 2020-05-19 ASSESSMENT — ENCOUNTER SYMPTOMS
ABDOMINAL PAIN: 0
CHEST TIGHTNESS: 0
COLOR CHANGE: 0
BACK PAIN: 0
WHEEZING: 0

## 2020-05-19 NOTE — PROGRESS NOTES
2020    TELEHEALTH EVALUATION -- Audio/Visual (During JZBIN-19 public health emergency)    HPI:    Scooter Goldberg (:  1950) has requested an audio/video evaluation for the following concern(s):    Feels well-trying to work on diet-has a great deal of anxiety over the pandemic- is doing painting regularly for anxiety-Pt taking thyroid meds reliably every am fasting-bp's have been well controlled- taking meds regularly-some right hip pain- biking regularly on stationary bike at home-taking her fiber supplements and had colonocopy w several polyps-has some insomnia       Review of Systems   Constitutional: Negative for activity change, appetite change and fatigue. HENT: Negative for congestion. Eyes: Negative for visual disturbance. Respiratory: Negative for chest tightness and wheezing. Cardiovascular: Negative for chest pain and palpitations. Gastrointestinal: Negative for abdominal pain. Musculoskeletal: Positive for arthralgias and gait problem. Negative for back pain. Skin: Negative for color change and rash. Neurological: Negative for weakness, light-headedness and headaches. Psychiatric/Behavioral: The patient is nervous/anxious. Prior to Visit Medications    Medication Sig Taking?  Authorizing Provider   buPROPion (WELLBUTRIN XL) 150 MG extended release tablet TAKE 1 TABLET EVERY MORNING Yes Antonio Skelton MD   omeprazole (PRILOSEC) 40 MG delayed release capsule Take 1 capsule by mouth daily Yes Antonio Skelton MD   atorvastatin (LIPITOR) 40 MG tablet TAKE 1 TABLET EVERY DAY Yes Antonio Skelton MD   olmesartan-hydrochlorothiazide (BENICAR HCT) 40-12.5 MG per tablet Take 1 tablet by mouth daily Yes Antonio Skelton MD   metFORMIN (GLUCOPHAGE-XR) 500 MG extended release tablet Take 1 tablet by mouth 2 times daily Yes Antonio Skelton MD   meloxicam (MOBIC) 15 MG tablet Take 1 tablet by mouth daily Yes Antonio Skelton MD   metoprolol succinate (TOPROL XL) 100 MG extended release tablet TAKE 1 TABLET EVERY DAY Yes Kenny Castillo MD   diltiazem (CARDIZEM CD) 120 MG extended release capsule Take 1 capsule by mouth daily Yes Kenny Castillo MD   levothyroxine (SYNTHROID) 25 MCG tablet Take 1 tablet by mouth Daily Yes Kenny Castillo MD   aspirin 81 MG tablet Take 81 mg by mouth daily Yes Historical Provider, MD   fluticasone (FLONASE) 50 MCG/ACT nasal spray 1 spray by Each Nare route daily Yes Historical Provider, MD       Social History     Tobacco Use    Smoking status: Never Smoker    Smokeless tobacco: Never Used   Substance Use Topics    Alcohol use: Not on file    Drug use: Not on file        No past medical history on file. PHYSICAL EXAMINATION:  [ INSTRUCTIONS:  \"[x]\" Indicates a positive item  \"[]\" Indicates a negative item  -- DELETE ALL ITEMS NOT EXAMINED]  Vital Signs: (As obtained by patient/caregiver or practitioner observation)    Blood pressure- 138/81afebrile   Constitutional: [x] Appears well-developed and well-nourished [x] No apparent distress      [] Abnormal-   Mental status  [x] Alert and awake  [x] Oriented to person/place/time [x]Able to follow commands      Eyes:  EOM    [x]  Normal  [] Abnormal-  Sclera  [x]  Normal  [] Abnormal -         Discharge [x]  None visible  [] Abnormal -    HENT:   [x] Normocephalic, atraumatic.   [x] Abnormal   [x] Mouth/Throat: Mucous membranes are moist.     External Ears [x] Normal  [] Abnormal-     Neck: [x] No visualized mass     Pulmonary/Chest: [x] Respiratory effort normal.  [x] No visualized signs of difficulty breathing or respiratory distress        [] Abnormal-      Musculoskeletal:   [] Normal gait with no signs of ataxia         [] Normal range of motion of neck        [x] Abnormal- pain w decreased rom in hip    Neurological:        [x] No Facial Asymmetry (Cranial nerve 7 motor function) (limited exam to video visit)          [x] No gaze palsy        [] Abnormal-         Skin:        [x] No significant exanthematous lesions or discoloration noted on facial skin         [] Abnormal-            Psychiatric:       [x] Normal Affect [x] No Hallucinations        [] Abnormal-     Obese    Assessment and Plan:      Personal history of colonic polyps  Cont fiber supplement    Arthritis of right hip  Cont wt loss and exercise and f/u w ortho    Diverticulosis of large intestine without hemorrhage  Cont fiber!!    Obstructive sleep apnea syndrome  Cont cpap and working on wt loss    Recurrent major depressive disorder, in partial remission ([facility] [address]  Sl worse w pandemic but coping well    Class 2 severe obesity due to excess calories with serious comorbidity and body mass index (BMI) of 38.0 to 38.9 in adult [facility])  Discussed with patient at length health risks of obesity and need for diet and exercise      Type 2 diabetes mellitus without complication, without long-term current use of insulin ([facility] [address]  Recheck next labs-last glyco good at 6.0    Other specified hypothyroidism  Controlled w current regimen- continue and monitor closely        Pure hypercholesterolemia  Cont statin and tammi next labs              Return in about 5 months (around 10/19/2020) for awv. Harley Carter is a 71 y.o. female being evaluated by a Virtual Visit (video visit) encounter to address concerns as mentioned above. A caregiver was present when appropriate. Due to this being a TeleHealth encounter (During [facility] public health emergency), evaluation of the following organ systems was limited: Vitals/Constitutional/EENT/Resp/CV/GI//MS/Neuro/Skin/Heme-Lymph-Imm. Pursuant to the emergency declaration under the [facility], [address] [place] [address] authority and the [employer] Act, this Virtual Visit was conducted with patient's (and/or legal guardian's) consent, to reduce the patient's risk of exposure to COVID-19 and provide necessary medical care.   The patient (and/or legal guardian) has also

## 2020-06-29 ENCOUNTER — HOSPITAL ENCOUNTER (OUTPATIENT)
Dept: MAMMOGRAPHY | Age: 70
Discharge: HOME | End: 2020-06-29
Payer: MEDICARE

## 2020-06-29 PROCEDURE — 77063 BREAST TOMOSYNTHESIS BI: CPT

## 2020-08-03 ENCOUNTER — TELEPHONE (OUTPATIENT)
Dept: INTERNAL MEDICINE CLINIC | Age: 70
End: 2020-08-03

## 2020-08-03 NOTE — TELEPHONE ENCOUNTER
Try cozaar 100 mg daily and hctz 12.5 daily- but also her AWV needs to be changed to after 11/20 - last one was 11/20 last year

## 2020-08-03 NOTE — TELEPHONE ENCOUNTER
Ingrid murphy with [employer] called to inform that olmesartan-hydrochlorothiazide (BENICAR HCT) 40-12.5 MG per table is on back order and would like to know if patient can get an alterative. Phone number [phone] Direct phone number reference number [phone]    Please call and advise.

## 2020-08-04 RX ORDER — OLMESARTAN MEDOXOMIL 40 MG/1
40 TABLET ORAL DAILY
Qty: 90 TABLET | Refills: 1 | OUTPATIENT
Start: 2020-08-04 | End: 2021-03-23

## 2020-08-04 RX ORDER — HYDROCHLOROTHIAZIDE 12.5 MG/1
12.5 CAPSULE, GELATIN COATED ORAL EVERY MORNING
Qty: 30 CAPSULE | Refills: 5 | OUTPATIENT
Start: 2020-08-04 | End: 2021-03-23

## 2020-10-07 ENCOUNTER — TELEPHONE (OUTPATIENT)
Dept: INTERNAL MEDICINE CLINIC | Age: 70
End: 2020-10-07

## 2020-10-08 NOTE — TELEPHONE ENCOUNTER
Let pt know that the egg allergy is not an issue unless she has had an anaphylactic reaction w any egg consumption and I don't think she has- and yes she should delay her visit to an AWV after nov 20 and yes she can do it virtually-her lab orders are in epic so have her do the labs a few days ahead

## 2020-11-19 DIAGNOSIS — E55.9 VITAMIN D DEFICIENCY: ICD-10-CM

## 2020-11-19 DIAGNOSIS — M16.11 ARTHRITIS OF RIGHT HIP: ICD-10-CM

## 2020-11-19 DIAGNOSIS — E11.9 TYPE 2 DIABETES MELLITUS WITHOUT COMPLICATION, WITHOUT LONG-TERM CURRENT USE OF INSULIN (HCC): ICD-10-CM

## 2020-11-19 DIAGNOSIS — E03.8 OTHER SPECIFIED HYPOTHYROIDISM: ICD-10-CM

## 2020-11-19 DIAGNOSIS — F33.41 RECURRENT MAJOR DEPRESSIVE DISORDER, IN PARTIAL REMISSION (HCC): ICD-10-CM

## 2020-11-19 DIAGNOSIS — E66.01 CLASS 2 SEVERE OBESITY DUE TO EXCESS CALORIES WITH SERIOUS COMORBIDITY AND BODY MASS INDEX (BMI) OF 38.0 TO 38.9 IN ADULT (HCC): ICD-10-CM

## 2020-11-19 DIAGNOSIS — K57.30 DIVERTICULOSIS OF LARGE INTESTINE WITHOUT HEMORRHAGE: ICD-10-CM

## 2020-11-19 DIAGNOSIS — Z86.010 PERSONAL HISTORY OF COLONIC POLYPS: ICD-10-CM

## 2020-11-19 DIAGNOSIS — G47.33 OBSTRUCTIVE SLEEP APNEA SYNDROME: ICD-10-CM

## 2020-11-19 DIAGNOSIS — E78.00 PURE HYPERCHOLESTEROLEMIA: ICD-10-CM

## 2020-11-19 LAB
A/G RATIO: 1.4 (ref 1.1–2.2)
ALBUMIN SERPL-MCNC: 4 G/DL (ref 3.4–5)
ALP BLD-CCNC: 103 U/L (ref 40–129)
ALT SERPL-CCNC: 16 U/L (ref 10–40)
ANION GAP SERPL CALCULATED.3IONS-SCNC: 12 MMOL/L (ref 3–16)
AST SERPL-CCNC: 11 U/L (ref 15–37)
BASOPHILS ABSOLUTE: 0 K/UL (ref 0–0.2)
BASOPHILS RELATIVE PERCENT: 0.4 %
BILIRUB SERPL-MCNC: 0.5 MG/DL (ref 0–1)
BUN BLDV-MCNC: 14 MG/DL (ref 7–20)
CALCIUM SERPL-MCNC: 9.5 MG/DL (ref 8.3–10.6)
CHLORIDE BLD-SCNC: 103 MMOL/L (ref 99–110)
CHOLESTEROL, TOTAL: 158 MG/DL (ref 0–199)
CO2: 29 MMOL/L (ref 21–32)
CREAT SERPL-MCNC: 0.5 MG/DL (ref 0.6–1.2)
CREATININE URINE: 89.1 MG/DL (ref 28–259)
EOSINOPHILS ABSOLUTE: 0.3 K/UL (ref 0–0.6)
EOSINOPHILS RELATIVE PERCENT: 4.8 %
ESTIMATED AVERAGE GLUCOSE: 145.6 MG/DL
GFR AFRICAN AMERICAN: >60
GFR NON-AFRICAN AMERICAN: >60
GLOBULIN: 2.9 G/DL
GLUCOSE BLD-MCNC: 141 MG/DL (ref 70–99)
HBA1C MFR BLD: 6.7 %
HCT VFR BLD CALC: 40 % (ref 36–48)
HDLC SERPL-MCNC: 42 MG/DL (ref 40–60)
HEMOGLOBIN: 13 G/DL (ref 12–16)
LDL CHOLESTEROL CALCULATED: 81 MG/DL
LYMPHOCYTES ABSOLUTE: 1.4 K/UL (ref 1–5.1)
LYMPHOCYTES RELATIVE PERCENT: 22.1 %
MCH RBC QN AUTO: 29.7 PG (ref 26–34)
MCHC RBC AUTO-ENTMCNC: 32.4 G/DL (ref 31–36)
MCV RBC AUTO: 91.6 FL (ref 80–100)
MICROALBUMIN UR-MCNC: 2.6 MG/DL
MICROALBUMIN/CREAT UR-RTO: 29.2 MG/G (ref 0–30)
MONOCYTES ABSOLUTE: 0.5 K/UL (ref 0–1.3)
MONOCYTES RELATIVE PERCENT: 7.4 %
NEUTROPHILS ABSOLUTE: 4.2 K/UL (ref 1.7–7.7)
NEUTROPHILS RELATIVE PERCENT: 65.3 %
PDW BLD-RTO: 14.2 % (ref 12.4–15.4)
PLATELET # BLD: 328 K/UL (ref 135–450)
PMV BLD AUTO: 8 FL (ref 5–10.5)
POTASSIUM SERPL-SCNC: 4.3 MMOL/L (ref 3.5–5.1)
RBC # BLD: 4.37 M/UL (ref 4–5.2)
SODIUM BLD-SCNC: 144 MMOL/L (ref 136–145)
TOTAL PROTEIN: 6.9 G/DL (ref 6.4–8.2)
TRIGL SERPL-MCNC: 173 MG/DL (ref 0–150)
TSH SERPL DL<=0.05 MIU/L-ACNC: 2.47 UIU/ML (ref 0.27–4.2)
VITAMIN D 25-HYDROXY: 29.4 NG/ML
VLDLC SERPL CALC-MCNC: 35 MG/DL
WBC # BLD: 6.4 K/UL (ref 4–11)

## 2020-12-01 ENCOUNTER — VIRTUAL VISIT (OUTPATIENT)
Dept: INTERNAL MEDICINE CLINIC | Age: 70
End: 2020-12-01
Payer: MEDICARE

## 2020-12-01 VITALS — WEIGHT: 275 LBS | DIASTOLIC BLOOD PRESSURE: 80 MMHG | SYSTOLIC BLOOD PRESSURE: 128 MMHG | BODY MASS INDEX: 40.61 KG/M2

## 2020-12-01 PROCEDURE — G0439 PPPS, SUBSEQ VISIT: HCPCS | Performed by: INTERNAL MEDICINE

## 2020-12-01 PROCEDURE — 3044F HG A1C LEVEL LT 7.0%: CPT | Performed by: INTERNAL MEDICINE

## 2020-12-01 PROCEDURE — G8482 FLU IMMUNIZE ORDER/ADMIN: HCPCS | Performed by: INTERNAL MEDICINE

## 2020-12-01 PROCEDURE — 99214 OFFICE O/P EST MOD 30 MIN: CPT | Performed by: INTERNAL MEDICINE

## 2020-12-01 PROCEDURE — 3017F COLORECTAL CA SCREEN DOC REV: CPT | Performed by: INTERNAL MEDICINE

## 2020-12-01 PROCEDURE — 1036F TOBACCO NON-USER: CPT | Performed by: INTERNAL MEDICINE

## 2020-12-01 PROCEDURE — 4040F PNEUMOC VAC/ADMIN/RCVD: CPT | Performed by: INTERNAL MEDICINE

## 2020-12-01 PROCEDURE — 1123F ACP DISCUSS/DSCN MKR DOCD: CPT | Performed by: INTERNAL MEDICINE

## 2020-12-01 PROCEDURE — G8399 PT W/DXA RESULTS DOCUMENT: HCPCS | Performed by: INTERNAL MEDICINE

## 2020-12-01 PROCEDURE — 2022F DILAT RTA XM EVC RTNOPTHY: CPT | Performed by: INTERNAL MEDICINE

## 2020-12-01 PROCEDURE — G8427 DOCREV CUR MEDS BY ELIG CLIN: HCPCS | Performed by: INTERNAL MEDICINE

## 2020-12-01 PROCEDURE — G8417 CALC BMI ABV UP PARAM F/U: HCPCS | Performed by: INTERNAL MEDICINE

## 2020-12-01 PROCEDURE — 1090F PRES/ABSN URINE INCON ASSESS: CPT | Performed by: INTERNAL MEDICINE

## 2020-12-01 RX ORDER — MELOXICAM 15 MG/1
15 TABLET ORAL DAILY
Qty: 90 TABLET | Refills: 3 | Status: SHIPPED | OUTPATIENT
Start: 2020-12-01 | End: 2022-01-07 | Stop reason: SDUPTHER

## 2020-12-01 RX ORDER — METFORMIN HYDROCHLORIDE 500 MG/1
1000 TABLET, EXTENDED RELEASE ORAL 2 TIMES DAILY
Qty: 360 TABLET | Refills: 3 | Status: SHIPPED | OUTPATIENT
Start: 2020-12-01 | End: 2021-01-28

## 2020-12-01 RX ORDER — LEVOTHYROXINE SODIUM 0.03 MG/1
25 TABLET ORAL DAILY
Qty: 90 TABLET | Refills: 3 | Status: SHIPPED | OUTPATIENT
Start: 2020-12-01 | End: 2021-12-30 | Stop reason: SDUPTHER

## 2020-12-01 RX ORDER — OMEPRAZOLE 40 MG/1
40 CAPSULE, DELAYED RELEASE ORAL DAILY
Qty: 90 CAPSULE | Refills: 3 | Status: SHIPPED | OUTPATIENT
Start: 2020-12-01 | End: 2021-12-30 | Stop reason: SDUPTHER

## 2020-12-01 RX ORDER — OLMESARTAN MEDOXOMIL AND HYDROCHLOROTHIAZIDE 40/12.5 40; 12.5 MG/1; MG/1
1 TABLET ORAL DAILY
Qty: 90 TABLET | Refills: 3 | Status: SHIPPED | OUTPATIENT
Start: 2020-12-01 | End: 2021-11-04

## 2020-12-01 RX ORDER — CHLORAL HYDRATE 500 MG
2000 CAPSULE ORAL 2 TIMES DAILY
Qty: 90 CAPSULE | Refills: 3 | Status: SHIPPED | OUTPATIENT
Start: 2020-12-01

## 2020-12-01 RX ORDER — DILTIAZEM HYDROCHLORIDE 120 MG/1
120 CAPSULE, COATED, EXTENDED RELEASE ORAL DAILY
Qty: 90 CAPSULE | Refills: 3 | Status: SHIPPED | OUTPATIENT
Start: 2020-12-01 | End: 2022-01-07 | Stop reason: SDUPTHER

## 2020-12-01 RX ORDER — BUPROPION HYDROCHLORIDE 150 MG/1
TABLET ORAL
Qty: 90 TABLET | Refills: 3 | Status: SHIPPED | OUTPATIENT
Start: 2020-12-01 | End: 2021-12-30 | Stop reason: SDUPTHER

## 2020-12-01 RX ORDER — METOPROLOL SUCCINATE 100 MG/1
TABLET, EXTENDED RELEASE ORAL
Qty: 90 TABLET | Refills: 3 | Status: SHIPPED | OUTPATIENT
Start: 2020-12-01 | End: 2021-12-30 | Stop reason: SDUPTHER

## 2020-12-01 RX ORDER — ATORVASTATIN CALCIUM 40 MG/1
TABLET, FILM COATED ORAL
Qty: 90 TABLET | Refills: 3 | Status: SHIPPED | OUTPATIENT
Start: 2020-12-01 | End: 2021-12-30 | Stop reason: SDUPTHER

## 2020-12-01 ASSESSMENT — ENCOUNTER SYMPTOMS
BACK PAIN: 0
COLOR CHANGE: 0
CHEST TIGHTNESS: 0
WHEEZING: 0
ABDOMINAL PAIN: 0
SHORTNESS OF BREATH: 0

## 2020-12-01 ASSESSMENT — PATIENT HEALTH QUESTIONNAIRE - PHQ9
SUM OF ALL RESPONSES TO PHQ QUESTIONS 1-9: 2
SUM OF ALL RESPONSES TO PHQ QUESTIONS 1-9: 2
2. FEELING DOWN, DEPRESSED OR HOPELESS: 1
1. LITTLE INTEREST OR PLEASURE IN DOING THINGS: 1
SUM OF ALL RESPONSES TO PHQ QUESTIONS 1-9: 2
SUM OF ALL RESPONSES TO PHQ9 QUESTIONS 1 & 2: 2

## 2020-12-01 ASSESSMENT — LIFESTYLE VARIABLES
AUDIT-C TOTAL SCORE: 3
HOW OFTEN DO YOU HAVE SIX OR MORE DRINKS ON ONE OCCASION: 0
HOW MANY STANDARD DRINKS CONTAINING ALCOHOL DO YOU HAVE ON A TYPICAL DAY: 0
HOW OFTEN DO YOU HAVE A DRINK CONTAINING ALCOHOL: 3

## 2020-12-01 NOTE — PATIENT INSTRUCTIONS
Personalized Preventive Plan for Guerita Delcid - 12/1/2020  Medicare offers a range of preventive health benefits. Some of the tests and screenings are paid in full while other may be subject to a deductible, co-insurance, and/or copay. Some of these benefits include a comprehensive review of your medical history including lifestyle, illnesses that may run in your family, and various assessments and screenings as appropriate. After reviewing your medical record and screening and assessments performed today your provider may have ordered immunizations, labs, imaging, and/or referrals for you. A list of these orders (if applicable) as well as your Preventive Care list are included within your After Visit Summary for your review. Other Preventive Recommendations:    · A preventive eye exam performed by an eye specialist is recommended every 1-2 years to screen for glaucoma; cataracts, macular degeneration, and other eye disorders. · A preventive dental visit is recommended every 6 months. · Try to get at least 150 minutes of exercise per week or 10,000 steps per day on a pedometer . · Order or download the FREE \"Exercise & Physical Activity: Your Everyday Guide\" from [employer]. Call 7-172.478.8378 or search [employer] online. · You need 8059-5655 mg of calcium and 6116-6221 IU of vitamin D per day. It is possible to meet your calcium requirement with diet alone, but a vitamin D supplement is usually necessary to meet this goal.  · When exposed to the sun, use a sunscreen that protects against both UVA and UVB radiation with an SPF of 30 or greater. Reapply every 2 to 3 hours or after sweating, drying off with a towel, or swimming. · Always wear a seat belt when traveling in a car. Always wear a helmet when riding a bicycle or motorcycle.

## 2020-12-01 NOTE — PROGRESS NOTES
2020    TELEHEALTH EVALUATION -- Audio/Visual (During UQPTM-04 public health emergency)    HPI:    Holger Lazo (:  1950) has requested an audio/video evaluation for the following concern(s):      Having severe r hip pain- aggravated after using her recumbent bike - no new injury except the biking- no redness or swelling but unable to walk at all due to the pain- has gained back all of the weight she had lost and is not eating correctly and not moving due to the hip pain- A1C has come up along weight- Pt taking thyroid meds reliably every am fasting-vit d was sl low -depression seems worse w the pandemic and isolation-triglycerides were also high   Review of Systems   Constitutional: Negative for activity change, appetite change and fatigue. HENT: Negative for congestion. Eyes: Negative for visual disturbance. Respiratory: Negative for chest tightness, shortness of breath and wheezing. Cardiovascular: Negative for chest pain and palpitations. Gastrointestinal: Negative for abdominal pain. Musculoskeletal: Positive for arthralgias, gait problem and myalgias. Negative for back pain. Skin: Negative for color change and rash. Neurological: Negative for weakness, light-headedness and headaches. Psychiatric/Behavioral: Positive for dysphoric mood. Prior to Visit Medications    Medication Sig Taking?  Authorizing Provider   Multiple Vitamins-Minerals (ICAPS AREDS 2 PO) Take by mouth daily Yes Historical Provider, MD   metroNIDAZOLE (METROCREAM) 0.75 % cream Daily Yes Historical Provider, MD   metFORMIN (GLUCOPHAGE-XR) 500 MG extended release tablet Take 2 tablets by mouth 2 times daily Yes Aung Low MD   buPROPion (WELLBUTRIN XL) 150 MG extended release tablet TAKE 1 TABLET EVERY MORNING Yes Aung Low MD   omeprazole (PRILOSEC) 40 MG delayed release capsule Take 1 capsule by mouth daily Yes Aung Low MD   atorvastatin (LIPITOR) 40 MG tablet TAKE 1 TABLET EVERY DAY Yes Dasha Rehman MD   meloxicam (MOBIC) 15 MG tablet Take 1 tablet by mouth daily Yes Dasha Rehman MD   metoprolol succinate (TOPROL XL) 100 MG extended release tablet TAKE 1 TABLET EVERY DAY Yes Dasha Rehman MD   dilTIAZem (CARDIZEM CD) 120 MG extended release capsule Take 1 capsule by mouth daily Yes Dasha Rehman MD   levothyroxine (SYNTHROID) 25 MCG tablet Take 1 tablet by mouth Daily Yes Dasha Rehman MD   olmesartan-hydroCHLOROthiazide (BENICAR HCT) 40-12.5 MG per tablet Take 1 tablet by mouth daily Yes Dasha Rehman MD   Omega-3 Fatty Acids (FISH OIL) 1000 MG CAPS Take 2 capsules by mouth 2 times daily Yes Dasha Rehman MD   olmesartan (BENICAR) 40 MG tablet Take 1 tablet by mouth daily Yes Dasha Rehman MD   hydroCHLOROthiazide (MICROZIDE) 12.5 MG capsule Take 1 capsule by mouth every morning Yes Dasha Rehman MD   aspirin 81 MG tablet Take 81 mg by mouth daily  Historical Provider, MD   fluticasone (FLONASE) 50 MCG/ACT nasal spray 1 spray by Each Nare route daily  Historical Provider, MD       Social History     Tobacco Use    Smoking status: Never Smoker    Smokeless tobacco: Never Used   Substance Use Topics    Alcohol use: Not on file    Drug use: Not on file        History reviewed. No pertinent past medical history. History reviewed. No pertinent family history. PHYSICAL EXAMINATION:    Vitals:    12/01/20 1114   BP: 128/80   Weight: 275 lb (124.7 kg)        Constitutional: [x] Appears well-developed and well-nourished [x] No apparent distress      [] Abnormal-   Mental status  [x] Alert and awake  [x] Oriented to person/place/time [x]Able to follow commands      Eyes:  EOM    [x]  Normal  [] Abnormal-  Sclera  [x]  Normal  [] Abnormal -         Discharge [x]  None visible  [] Abnormal -    HENT:   [x] Normocephalic, atraumatic.   [] Abnormal   [x] Mouth/Throat: Mucous membranes are moist.     External Ears [x] Normal  [] Abnormal-     Neck: [x] No visualized mass     Pulmonary/Chest: [x] Respiratory effort normal.  [x] No visualized signs of difficulty breathing or respiratory distress        [] Abnormal-      Musculoskeletal:   [] Normal gait with no signs of ataxia         [x] Normal range of motion of neck        [] Abnormal-       Neurological:        [x] No Facial Asymmetry (Cranial nerve 7 motor function) (limited exam to video visit)          [x] No gaze palsy        [] Abnormal-         Skin:        [x] No significant exanthematous lesions or discoloration noted on facial skin         [] Abnormal-            Psychiatric:       [x] Normal Affect [x] No Hallucinations        [] Abnormal-         Assessment and Plan:      Arthritis of right hip  Referred to Dr. Venancio Chatman     Other specified hypothyroidism  Controlled w current regimen- continue and monitor closely      Type 2 diabetes mellitus without complication, without long-term current use of insulin (HCC)  Increase metformin gradually to 1000 u bid w food     Class 2 severe obesity due to excess calories with serious comorbidity and body mass index (BMI) of 38.0 to 38.9 in adult [facility])  Discussed with patient at length health risks of obesity and need for diet and exercise      Recurrent major depressive disorder, in partial remission [facility])  Consider seeing therapist in our office     Personal history of colonic polyps  Cont high fiber diet               Return in about 4 months (around 4/1/2021) for follow up. Yordan Orlando is a 79 y.o. female being evaluated by a Virtual Visit (video visit) encounter to address concerns as mentioned above. A caregiver was present when appropriate. Due to this being a TeleHealth encounter (During ZIVDW-15 public health emergency), evaluation of the following organ systems was limited: Vitals/Constitutional/EENT/Resp/CV/GI//MS/Neuro/Skin/Heme-Lymph-Imm.   Pursuant to the emergency declaration under the [address] [facility] [address] [address] and Response Supplemental Appropriations Act, this Virtual Visit was conducted with patient's (and/or legal guardian's) consent, to reduce the patient's risk of exposure to COVID-19 and provide necessary medical care. The patient (and/or legal guardian) has also been advised to contact this office for worsening conditions or problems, and seek emergency medical treatment and/or call 911 if deemed necessary. Patient identification was verified at the start of the visit: yes    Total time spent on this encounter: 25 mins    Services were provided through a video synchronous discussion virtually to substitute for in-person clinic visit. Patient and provider were located at their individual homes. --Rah Gomez MD on 12/1/2020 at 11:23 AM    An electronic signature was used to authenticate this note.

## 2020-12-01 NOTE — PROGRESS NOTES
Medicare Annual Wellness Visit  Name: Virginia Ayala Date: 2020   MRN: <[id number]> Sex: Female   Age: 79 y.o. Ethnicity: Non-/Non    : 1950 Race: Brooke Jackson is here for Medicare AWV    Screenings for behavioral, psychosocial and functional/safety risks, and cognitive dysfunction are all negative except as indicated below. These results, as well as other patient data from the [address] form, are documented in Flowsheets linked to this Encounter. Allergies   Allergen Reactions    Gluten Meal     Other      Dogs    Eggs Or Egg-Derived Products      Gets egg free immunizations        Prior to Visit Medications    Medication Sig Taking?  Authorizing Provider   Multiple Vitamins-Minerals (ICAPS AREDS 2 PO) Take by mouth daily Yes Historical Provider, MD   metroNIDAZOLE (METROCREAM) 0.75 % cream Daily Yes Historical Provider, MD   olmesartan (BENICAR) 40 MG tablet Take 1 tablet by mouth daily Yes Rayna Baldwin MD   hydroCHLOROthiazide (MICROZIDE) 12.5 MG capsule Take 1 capsule by mouth every morning Yes Rayna Baldwin MD   buPROPion (WELLBUTRIN XL) 150 MG extended release tablet TAKE 1 TABLET EVERY MORNING Yes Rayna Baldwin MD   omeprazole (PRILOSEC) 40 MG delayed release capsule Take 1 capsule by mouth daily Yes Rayna Baldwin MD   atorvastatin (LIPITOR) 40 MG tablet TAKE 1 TABLET EVERY DAY Yes Rayna Baldwin MD   metFORMIN (GLUCOPHAGE-XR) 500 MG extended release tablet Take 1 tablet by mouth 2 times daily Yes Rayna Baldwin MD   meloxicam (MOBIC) 15 MG tablet Take 1 tablet by mouth daily Yes Rayna Baldwin MD   metoprolol succinate (TOPROL XL) 100 MG extended release tablet TAKE 1 TABLET EVERY DAY Yes Rayna Baldwin MD   diltiazem (CARDIZEM CD) 120 MG extended release capsule Take 1 capsule by mouth daily Yes Rayna Baldwin MD   levothyroxine (SYNTHROID) 25 MCG tablet Take 1 tablet by mouth Daily Yes Rayna Baldwin MD   aspirin 81 MG tablet Take 81 mg by mouth 10/03/2019    Pneumococcal Conjugate 13-valent ([id number]) 10/03/2019    Pneumococcal Polysaccharide ([id number]) 06/13/2017    Tdap (Boostrix, Adacel) 05/23/2013, 10/03/2019        Health Maintenance   Topic Date Due    Shingles Vaccine (1 of 2) 11/28/2000    Annual Wellness Visit (AWV)  07/09/2019    Diabetic foot exam  04/08/2020    Diabetic retinal exam  10/12/2021    A1C test (Diabetic or Prediabetic)  11/19/2021    Diabetic microalbuminuria test  11/19/2021    Lipid screen  11/19/2021    TSH testing  11/19/2021    Potassium monitoring  11/19/2021    Creatinine monitoring  11/19/2021    Breast cancer screen  06/29/2022    DTaP/Tdap/Td vaccine (3 - Td) 10/03/2029    Colon cancer screen colonoscopy  03/10/2030    DEXA (modify frequency per FRAX score)  Completed    Flu vaccine  Completed    Pneumococcal 65+ years Vaccine  Completed    Hepatitis C screen  Completed    Hepatitis A vaccine  Aged Out    Hib vaccine  Aged Out    Meningococcal (ACWY) vaccine  Aged Out     Recommendations for Leonar3Do Due: see orders and patient instructions/AVS.  . Recommended screening schedule for the next 5-10 years is provided to the patient in written form: see Patient Instructions/AVS.    There are no diagnoses linked to this encounter. Edgar Peoples is a 79 y.o. female being evaluated by a Virtual Visit (video and audio) encounter to address concerns as mentioned above. A caregiver was present when appropriate. Due to this being a TeleHealth encounter (During [facility]- public health emergency), evaluation of the following organ systems was limited: Vitals/Constitutional/EENT/Resp/CV/GI//MS/Neuro/Skin/Heme-Lymph-Imm.   Pursuant to the emergency declaration under the [facility], [address] [place] [address] authority and the [employer] Act, this Virtual Visit was conducted with patient's (and/or legal guardian's) consent, to reduce the patient's risk of exposure to COVID-19 and provide necessary medical care. The patient (and/or legal guardian) has also been advised to contact this office for worsening conditions or problems, and seek emergency medical treatment and/or call 911 if deemed necessary. Patient identification was verified at the start of the visit: Yes    Services were provided through a video synchronous discussion virtually to substitute for in-person clinic visit. Patient and provider were located at their individual homes. --Tony Claros MD on 12/1/2020 at 11:02 AM    An electronic signature was used to authenticate this note.

## 2021-01-28 RX ORDER — METFORMIN HYDROCHLORIDE 500 MG/1
TABLET, EXTENDED RELEASE ORAL
Qty: 180 TABLET | Refills: 3 | Status: SHIPPED | OUTPATIENT
Start: 2021-01-28 | End: 2021-08-02 | Stop reason: SDUPTHER

## 2021-03-03 ENCOUNTER — PATIENT MESSAGE (OUTPATIENT)
Dept: INTERNAL MEDICINE CLINIC | Age: 71
End: 2021-03-03

## 2021-03-12 ENCOUNTER — TELEPHONE (OUTPATIENT)
Dept: INTERNAL MEDICINE CLINIC | Age: 71
End: 2021-03-12

## 2021-03-12 NOTE — TELEPHONE ENCOUNTER
Pt called in with questions. She is currently scheduled for pre-op but paperwork stated pre-op needs to be at least 2 weeks prior to procedure. Also pt says she left a message via [employer] and Ms. Brigitte Arcadio replied that she would forward message to Dr. Mavis Paul. Pt was reassured that message was sent to Dr. Mavis Paul and doctor will respond as soon as available. Pt prefers call from Ms. Triana to discuss these questions.   Please advise

## 2021-03-23 ENCOUNTER — OFFICE VISIT (OUTPATIENT)
Dept: INTERNAL MEDICINE CLINIC | Age: 71
End: 2021-03-23
Payer: MEDICARE

## 2021-03-23 VITALS
SYSTOLIC BLOOD PRESSURE: 138 MMHG | DIASTOLIC BLOOD PRESSURE: 88 MMHG | HEIGHT: 69 IN | WEIGHT: 268 LBS | BODY MASS INDEX: 39.69 KG/M2 | TEMPERATURE: 97.4 F

## 2021-03-23 DIAGNOSIS — M16.11 ARTHRITIS OF RIGHT HIP: ICD-10-CM

## 2021-03-23 DIAGNOSIS — Z01.818 PRE-OP EXAMINATION: Primary | ICD-10-CM

## 2021-03-23 DIAGNOSIS — E66.01 CLASS 2 SEVERE OBESITY DUE TO EXCESS CALORIES WITH SERIOUS COMORBIDITY AND BODY MASS INDEX (BMI) OF 38.0 TO 38.9 IN ADULT (HCC): ICD-10-CM

## 2021-03-23 DIAGNOSIS — E78.00 PURE HYPERCHOLESTEROLEMIA: ICD-10-CM

## 2021-03-23 DIAGNOSIS — G47.33 OBSTRUCTIVE SLEEP APNEA SYNDROME: ICD-10-CM

## 2021-03-23 DIAGNOSIS — F33.41 RECURRENT MAJOR DEPRESSIVE DISORDER, IN PARTIAL REMISSION (HCC): ICD-10-CM

## 2021-03-23 DIAGNOSIS — Z01.818 PRE-OP EXAMINATION: ICD-10-CM

## 2021-03-23 DIAGNOSIS — E11.9 TYPE 2 DIABETES MELLITUS WITHOUT COMPLICATION, WITHOUT LONG-TERM CURRENT USE OF INSULIN (HCC): ICD-10-CM

## 2021-03-23 DIAGNOSIS — M79.672 INTRACTABLE LEFT HEEL PAIN: ICD-10-CM

## 2021-03-23 PROBLEM — I31.9 PERICARDITIS: Status: RESOLVED | Noted: 2018-09-25 | Resolved: 2021-03-23

## 2021-03-23 LAB
ALBUMIN SERPL-MCNC: 4.2 G/DL (ref 3.4–5)
ANION GAP SERPL CALCULATED.3IONS-SCNC: 13 MMOL/L (ref 3–16)
APTT: 32.4 SEC (ref 24.2–36.2)
BACTERIA: ABNORMAL /HPF
BILIRUBIN URINE: NEGATIVE
BLOOD, URINE: NEGATIVE
BUN BLDV-MCNC: 14 MG/DL (ref 7–20)
CALCIUM SERPL-MCNC: 9.8 MG/DL (ref 8.3–10.6)
CHLORIDE BLD-SCNC: 104 MMOL/L (ref 99–110)
CHOLESTEROL, TOTAL: 142 MG/DL (ref 0–199)
CLARITY: CLEAR
CO2: 25 MMOL/L (ref 21–32)
COLOR: YELLOW
CREAT SERPL-MCNC: <0.5 MG/DL (ref 0.6–1.2)
EPITHELIAL CELLS, UA: 3 /HPF (ref 0–5)
GFR AFRICAN AMERICAN: >60
GFR NON-AFRICAN AMERICAN: >60
GLUCOSE BLD-MCNC: 105 MG/DL (ref 70–99)
GLUCOSE URINE: NEGATIVE MG/DL
HDLC SERPL-MCNC: 41 MG/DL (ref 40–60)
HYALINE CASTS: 2 /LPF (ref 0–8)
INR BLD: 1.1 (ref 0.86–1.14)
KETONES, URINE: NEGATIVE MG/DL
LDL CHOLESTEROL CALCULATED: 69 MG/DL
LEUKOCYTE ESTERASE, URINE: NEGATIVE
MICROSCOPIC EXAMINATION: ABNORMAL
NITRITE, URINE: NEGATIVE
PH UA: 6 (ref 5–8)
PHOSPHORUS: 3.9 MG/DL (ref 2.5–4.9)
POTASSIUM SERPL-SCNC: 4.2 MMOL/L (ref 3.5–5.1)
PROTEIN UA: NEGATIVE MG/DL
PROTHROMBIN TIME: 12.8 SEC (ref 10–13.2)
RBC UA: 1 /HPF (ref 0–4)
SODIUM BLD-SCNC: 142 MMOL/L (ref 136–145)
SPECIFIC GRAVITY UA: 1.02 (ref 1–1.03)
TRIGL SERPL-MCNC: 158 MG/DL (ref 0–150)
URINE TYPE: ABNORMAL
UROBILINOGEN, URINE: 0.2 E.U./DL
VLDLC SERPL CALC-MCNC: 32 MG/DL
WBC UA: 1 /HPF (ref 0–5)

## 2021-03-23 PROCEDURE — 1090F PRES/ABSN URINE INCON ASSESS: CPT | Performed by: INTERNAL MEDICINE

## 2021-03-23 PROCEDURE — G8399 PT W/DXA RESULTS DOCUMENT: HCPCS | Performed by: INTERNAL MEDICINE

## 2021-03-23 PROCEDURE — 3017F COLORECTAL CA SCREEN DOC REV: CPT | Performed by: INTERNAL MEDICINE

## 2021-03-23 PROCEDURE — 3046F HEMOGLOBIN A1C LEVEL >9.0%: CPT | Performed by: INTERNAL MEDICINE

## 2021-03-23 PROCEDURE — G8427 DOCREV CUR MEDS BY ELIG CLIN: HCPCS | Performed by: INTERNAL MEDICINE

## 2021-03-23 PROCEDURE — G8482 FLU IMMUNIZE ORDER/ADMIN: HCPCS | Performed by: INTERNAL MEDICINE

## 2021-03-23 PROCEDURE — 2022F DILAT RTA XM EVC RTNOPTHY: CPT | Performed by: INTERNAL MEDICINE

## 2021-03-23 PROCEDURE — 1123F ACP DISCUSS/DSCN MKR DOCD: CPT | Performed by: INTERNAL MEDICINE

## 2021-03-23 PROCEDURE — G8417 CALC BMI ABV UP PARAM F/U: HCPCS | Performed by: INTERNAL MEDICINE

## 2021-03-23 PROCEDURE — 1036F TOBACCO NON-USER: CPT | Performed by: INTERNAL MEDICINE

## 2021-03-23 PROCEDURE — 93000 ELECTROCARDIOGRAM COMPLETE: CPT | Performed by: INTERNAL MEDICINE

## 2021-03-23 PROCEDURE — 99215 OFFICE O/P EST HI 40 MIN: CPT | Performed by: INTERNAL MEDICINE

## 2021-03-23 PROCEDURE — 4040F PNEUMOC VAC/ADMIN/RCVD: CPT | Performed by: INTERNAL MEDICINE

## 2021-03-23 RX ORDER — CEFAZOLIN SODIUM 2 G/50ML
2000 SOLUTION INTRAVENOUS ONCE
Status: CANCELLED | OUTPATIENT
Start: 2021-03-23 | End: 2021-03-23

## 2021-03-23 RX ORDER — OMEGA-3 FATTY ACIDS CAP DELAYED RELEASE 1000 MG 1000 MG
2000 CAPSULE DELAYED RELEASE ORAL 2 TIMES DAILY
COMMUNITY
End: 2021-03-23

## 2021-03-23 RX ORDER — CELECOXIB 200 MG/1
400 CAPSULE ORAL ONCE
Status: CANCELLED | OUTPATIENT
Start: 2021-03-23 | End: 2021-03-23

## 2021-03-23 ASSESSMENT — ENCOUNTER SYMPTOMS
COLOR CHANGE: 0
WHEEZING: 0
CHEST TIGHTNESS: 0
BACK PAIN: 1
ABDOMINAL PAIN: 0

## 2021-03-23 NOTE — PROGRESS NOTES
No need to do THERESA screening. Patient has sleep apnea with use of cpap machine asked to bring in with her.  -DB

## 2021-03-23 NOTE — LETTER
[place]  [address]  [address]  Phone: [phone]  Fax: [phone]    Dr. Terra Francois        March 23, 2021       Patient: Paras Echavarria   MR Number: <[id number]>   YOB: 1950   Date of Visit: 3/23/2021       Dear Dr. Minal Worthington:    Thank you for the request for consultation for [facility] to me for preoperative evaluation prior to right hip replacement. Below are the relevant portions of my assessment and plan of care. Subjective:      Patient ID: Paras Echavarria is a 79 y.o. female. Chief Complaint   Patient presents with    Pre-op Exam     pre-op right hip replacement      In for preop prior to right hip replacement. Using a cane now due to the pain. Still has to cope w the left foot pain with the heel collapse. Still struggling w her weight.      Paras Echavarria  1950    Allergies   Allergen Reactions    Gluten Meal     Other      Dogs    Eggs Or Egg-Derived Products      Gets egg free immunizations      Current Outpatient Medications   Medication Sig Dispense Refill    Omega-3 Fatty Acids (FISH OIL) 1000 MG CPDR Take 2,000 mg by mouth 2 times daily      metFORMIN (GLUCOPHAGE-XR) 500 MG extended release tablet TAKE 1 TABLET TWICE A DAY (Patient taking differently: Take 500 mg by mouth TAKE TWO (2) TWICE DAILY) 180 tablet 3    Multiple Vitamins-Minerals (ICAPS AREDS 2 PO) Take by mouth daily      metroNIDAZOLE (METROCREAM) 0.75 % cream Daily      buPROPion (WELLBUTRIN XL) 150 MG extended release tablet TAKE 1 TABLET EVERY MORNING 90 tablet 3    omeprazole (PRILOSEC) 40 MG delayed release capsule Take 1 capsule by mouth daily 90 capsule 3    atorvastatin (LIPITOR) 40 MG tablet TAKE 1 TABLET EVERY DAY 90 tablet 3    meloxicam (MOBIC) 15 MG tablet Take 1 tablet by mouth daily 90 tablet 3    metoprolol succinate (TOPROL XL) 100 MG extended release tablet TAKE 1 TABLET EVERY DAY 90 tablet 3    dilTIAZem (CARDIZEM CD) 120 MG extended release capsule Take 1 capsule by mouth daily 90 capsule 3    levothyroxine (SYNTHROID) 25 MCG tablet Take 1 tablet by mouth Daily 90 tablet 3    olmesartan-hydroCHLOROthiazide (BENICAR HCT) 40-12.5 MG per tablet Take 1 tablet by mouth daily 90 tablet 3    Omega-3 Fatty Acids (FISH OIL) 1000 MG CAPS Take 2 capsules by mouth 2 times daily 90 capsule 3    olmesartan (BENICAR) 40 MG tablet Take 1 tablet by mouth daily 90 tablet 1    hydroCHLOROthiazide (MICROZIDE) 12.5 MG capsule Take 1 capsule by mouth every morning 30 capsule 5    fluticasone (FLONASE) 50 MCG/ACT nasal spray 1 spray by Each Nare route daily      aspirin 81 MG tablet Take 81 mg by mouth daily       No current facility-administered medications for this visit. Vitals:    03/23/21 1010   BP: 138/88   Temp: 97.4 °F (36.3 °C)   Weight: 268 lb (121.6 kg)   Height: 5' 9\" (1.753 m)     Body mass index is 39.58 kg/m².      Wt Readings from Last 3 Encounters:   03/23/21 268 lb (121.6 kg)   12/01/20 275 lb (124.7 kg)   05/19/20 261 lb (118.4 kg)     BP Readings from Last 3 Encounters:   03/23/21 138/88   12/01/20 128/80   05/19/20 138/81         Immunization History   Administered Date(s) Administered    COVID-19, Pfizer, PF, 30mcg/0.3mL 02/06/2021, 02/27/2021    Influenza Virus Vaccine 01/23/2014, 10/21/2014, 10/03/2019    Influenza, High Dose (Fluzone 65 yrs and older) 10/09/2020    Influenza, Intradermal, Quadrivalent, Preservative Free 09/20/2018    Influenza, MDCK Quadv, IM, PF (Flucelvax 4 yrs and older) 10/12/2017    Influenza, MDCK, Preservative free 10/08/2015    Influenza, Quadv, Recombinant, IM PF (Flublok 18 yrs and older) 10/03/2019    Influenza, Quadv, adjuvanted, 65 yrs +, IM, PF (Fluad) 10/09/2020    Pneumococcal Conjugate 13-valent ([id number]) 10/03/2019    Pneumococcal Polysaccharide ([id number]) 06/13/2017    Tdap (Boostrix, Adacel) 05/23/2013, 10/03/2019       Past Medical History: Diagnosis Date    Pericarditis 9/25/2018    Onset 2008 w 2 hospitalizations Treated w medrol prn for flares      Past Surgical History:   Procedure Laterality Date    JOINT REPLACEMENT Left 2014    JOINT REPLACEMENT Bilateral 2016    SHOULDER ARTHROPLASTY Bilateral 2013/2018    GONSALO AND BSO  2001     History reviewed. No pertinent family history. Social History     Socioeconomic History    Marital status:      Spouse name: Not on file    Number of children: Not on file    Years of education: Not on file    Highest education level: Not on file   Occupational History    Not on file   Social Needs    Financial resource strain: Not on file    Food insecurity     Worry: Never true     Inability: Never true    Transportation needs     Medical: No     Non-medical: No   Tobacco Use    Smoking status: Never Smoker    Smokeless tobacco: Never Used   Substance and Sexual Activity    Alcohol use: Not on file    Drug use: Not on file    Sexual activity: Not on file   Lifestyle    Physical activity     Days per week: Not on file     Minutes per session: Not on file    Stress: Not on file   Relationships    Social connections     Talks on phone: Not on file     Gets together: Not on file     Attends Religion service: Not on file     Active member of club or organization: Not on file     Attends meetings of clubs or organizations: Not on file     Relationship status: Not on file    Intimate partner violence     Fear of current or ex partner: Not on file     Emotionally abused: Not on file     Physically abused: Not on file     Forced sexual activity: Not on file   Other Topics Concern    Not on file   Social History Narrative    Not on file             Review of Systems   Constitutional: Negative for activity change, appetite change and fatigue. HENT: Negative for congestion. Eyes: Negative for visual disturbance. Respiratory: Negative for chest tightness and wheezing.     Cardiovascular: Negative for chest pain and palpitations. Gastrointestinal: Negative for abdominal pain. Musculoskeletal: Positive for arthralgias, back pain and myalgias. Skin: Negative for color change and rash. Neurological: Negative for weakness, light-headedness and headaches. Psychiatric/Behavioral: Positive for dysphoric mood. The patient is not nervous/anxious. Objective:   Physical Exam  Constitutional:       Appearance: She is well-developed. HENT:      Head: Normocephalic and atraumatic. Eyes:      Conjunctiva/sclera: Conjunctivae normal.      Pupils: Pupils are equal, round, and reactive to light. Neck:      Musculoskeletal: Normal range of motion and neck supple. Cardiovascular:      Rate and Rhythm: Normal rate and regular rhythm. Heart sounds: Normal heart sounds. Pulmonary:      Effort: Pulmonary effort is normal. No respiratory distress. Breath sounds: Normal breath sounds. Abdominal:      General: There is no distension. Tenderness: There is no abdominal tenderness. Musculoskeletal:         General: Tenderness present. Comments: Severe right hip pain w decreased rom   Lymphadenopathy:      Cervical: No cervical adenopathy. Skin:     General: Skin is warm and dry. Neurological:      Mental Status: She is alert and oriented to person, place, and time. Psychiatric:         Mood and Affect: Mood normal.         Behavior: Behavior normal.         Thought Content:  Thought content normal.         Judgment: Judgment normal.         DIABETIC FOOT EXAM:   normal DP and PT pulses, no trophic changes or ulcerative lesions and normal sensory exam      Assessment and Plan:      Type 2 diabetes mellitus without complication, without long-term current use of insulin ([facility])  Recheck A1C now     Class 2 severe obesity due to excess calories with serious comorbidity and body mass index (BMI) of 38.0 to 38.9 in [facility])  Discussed with patient at length health risks of obesity and need for diet and exercise      Pure hypercholesterolemia  Recheck now     Recurrent major depressive disorder, in partial remission ([facility] [address]  Fairly well controlled     Obstructive sleep apnea syndrome  Cont cpap    Intractable left heel pain  Eventually will address w foot surgeon    Arthritis of right hip  For replacement         Patient has been seen-history and physical performed and is medically cleared for surgery. If you have questions, please do not hesitate to call me. I look forward to following Deangelo Chris along with you.     Sincerely,        Khai Barrios MD    CC providers:    No Recipients

## 2021-03-23 NOTE — PROGRESS NOTES
Subjective:      Patient ID: Meeta Ventura is a 79 y.o. female. Chief Complaint   Patient presents with    Pre-op Exam     pre-op right hip replacement      In for preop prior to right hip replacement. Using a cane now due to the pain. Still has to cope w the left foot pain with the heel collapse. Still struggling w her weight.      Meeta Ventura  1950    Allergies   Allergen Reactions    Gluten Meal     Other      Dogs    Eggs Or Egg-Derived Products      Gets egg free immunizations      Current Outpatient Medications   Medication Sig Dispense Refill    Omega-3 Fatty Acids (FISH OIL) 1000 MG CPDR Take 2,000 mg by mouth 2 times daily      metFORMIN (GLUCOPHAGE-XR) 500 MG extended release tablet TAKE 1 TABLET TWICE A DAY (Patient taking differently: Take 500 mg by mouth TAKE TWO (2) TWICE DAILY) 180 tablet 3    Multiple Vitamins-Minerals (ICAPS AREDS 2 PO) Take by mouth daily      metroNIDAZOLE (METROCREAM) 0.75 % cream Daily      buPROPion (WELLBUTRIN XL) 150 MG extended release tablet TAKE 1 TABLET EVERY MORNING 90 tablet 3    omeprazole (PRILOSEC) 40 MG delayed release capsule Take 1 capsule by mouth daily 90 capsule 3    atorvastatin (LIPITOR) 40 MG tablet TAKE 1 TABLET EVERY DAY 90 tablet 3    meloxicam (MOBIC) 15 MG tablet Take 1 tablet by mouth daily 90 tablet 3    metoprolol succinate (TOPROL XL) 100 MG extended release tablet TAKE 1 TABLET EVERY DAY 90 tablet 3    dilTIAZem (CARDIZEM CD) 120 MG extended release capsule Take 1 capsule by mouth daily 90 capsule 3    levothyroxine (SYNTHROID) 25 MCG tablet Take 1 tablet by mouth Daily 90 tablet 3    olmesartan-hydroCHLOROthiazide (BENICAR HCT) 40-12.5 MG per tablet Take 1 tablet by mouth daily 90 tablet 3    Omega-3 Fatty Acids (FISH OIL) 1000 MG CAPS Take 2 capsules by mouth 2 times daily 90 capsule 3    olmesartan (BENICAR) 40 MG tablet Take 1 tablet by mouth daily 90 tablet 1    hydroCHLOROthiazide (MICROZIDE) 12.5 MG capsule Take 1 capsule by mouth every morning 30 capsule 5    fluticasone (FLONASE) 50 MCG/ACT nasal spray 1 spray by Each Nare route daily      aspirin 81 MG tablet Take 81 mg by mouth daily       No current facility-administered medications for this visit. Vitals:    03/23/21 1010   BP: 138/88   Temp: 97.4 °F (36.3 °C)   Weight: 268 lb (121.6 kg)   Height: 5' 9\" (1.753 m)     Body mass index is 39.58 kg/m². Wt Readings from Last 3 Encounters:   03/23/21 268 lb (121.6 kg)   12/01/20 275 lb (124.7 kg)   05/19/20 261 lb (118.4 kg)     BP Readings from Last 3 Encounters:   03/23/21 138/88   12/01/20 128/80   05/19/20 138/81         Immunization History   Administered Date(s) Administered    COVID-19, Pfizer, PF, 30mcg/0.3mL 02/06/2021, 02/27/2021    Influenza Virus Vaccine 01/23/2014, 10/21/2014, 10/03/2019    Influenza, High Dose (Fluzone 65 yrs and older) 10/09/2020    Influenza, Intradermal, Quadrivalent, Preservative Free 09/20/2018    Influenza, MDCK Quadv, IM, PF (Flucelvax 4 yrs and older) 10/12/2017    Influenza, MDCK, Preservative free 10/08/2015    Influenza, Quadv, Recombinant, IM PF (Flublok 18 yrs and older) 10/03/2019    Influenza, Quadv, adjuvanted, 65 yrs +, IM, PF (Fluad) 10/09/2020    Pneumococcal Conjugate 13-valent ([id number]) 10/03/2019    Pneumococcal Polysaccharide ([id number]) 06/13/2017    Tdap (Boostrix, Adacel) 05/23/2013, 10/03/2019       Past Medical History:   Diagnosis Date    Pericarditis 9/25/2018    Onset 2008 w 2 hospitalizations Treated w medrol prn for flares      Past Surgical History:   Procedure Laterality Date    JOINT REPLACEMENT Left 2014    JOINT REPLACEMENT Bilateral 2016    SHOULDER ARTHROPLASTY Bilateral 2013/2018    GONSALO AND BSO  2001     History reviewed. No pertinent family history.   Social History     Socioeconomic History    Marital status:      Spouse name: Not on file    Number of children: Not on file    Years of education: Not on file    Highest education level: Not on file   Occupational History    Not on file   Social Needs    Financial resource strain: Not on file    Food insecurity     Worry: Never true     Inability: Never true    Transportation needs     Medical: No     Non-medical: No   Tobacco Use    Smoking status: Never Smoker    Smokeless tobacco: Never Used   Substance and Sexual Activity    Alcohol use: Not on file    Drug use: Not on file    Sexual activity: Not on file   Lifestyle    Physical activity     Days per week: Not on file     Minutes per session: Not on file    Stress: Not on file   Relationships    Social connections     Talks on phone: Not on file     Gets together: Not on file     Attends Restorationist service: Not on file     Active member of club or organization: Not on file     Attends meetings of clubs or organizations: Not on file     Relationship status: Not on file    Intimate partner violence     Fear of current or ex partner: Not on file     Emotionally abused: Not on file     Physically abused: Not on file     Forced sexual activity: Not on file   Other Topics Concern    Not on file   Social History Narrative    Not on file             Review of Systems   Constitutional: Negative for activity change, appetite change and fatigue. HENT: Negative for congestion. Eyes: Negative for visual disturbance. Respiratory: Negative for chest tightness and wheezing. Cardiovascular: Negative for chest pain and palpitations. Gastrointestinal: Negative for abdominal pain. Musculoskeletal: Positive for arthralgias, back pain and myalgias. Skin: Negative for color change and rash. Neurological: Negative for weakness, light-headedness and headaches. Psychiatric/Behavioral: Positive for dysphoric mood. The patient is not nervous/anxious. Objective:   Physical Exam  Constitutional:       Appearance: She is well-developed. HENT:      Head: Normocephalic and atraumatic.    Eyes: Conjunctiva/sclera: Conjunctivae normal.      Pupils: Pupils are equal, round, and reactive to light. Neck:      Musculoskeletal: Normal range of motion and neck supple. Cardiovascular:      Rate and Rhythm: Normal rate and regular rhythm. Heart sounds: Normal heart sounds. Pulmonary:      Effort: Pulmonary effort is normal. No respiratory distress. Breath sounds: Normal breath sounds. Abdominal:      General: There is no distension. Tenderness: There is no abdominal tenderness. Musculoskeletal:         General: Tenderness present. Comments: Severe right hip pain w decreased rom   Lymphadenopathy:      Cervical: No cervical adenopathy. Skin:     General: Skin is warm and dry. Neurological:      Mental Status: She is alert and oriented to person, place, and time. Psychiatric:         Mood and Affect: Mood normal.         Behavior: Behavior normal.         Thought Content: Thought content normal.         Judgment: Judgment normal.         DIABETIC FOOT EXAM:   normal DP and PT pulses, no trophic changes or ulcerative lesions and normal sensory exam      Assessment and Plan:      Type 2 diabetes mellitus without complication, without long-term current use of insulin ([facility])  Recheck A1C now     Class 2 severe obesity due to excess calories with serious comorbidity and body mass index (BMI) of 38.0 to 38.9 in adult [facility])  Discussed with patient at length health risks of obesity and need for diet and exercise      Pure hypercholesterolemia  Recheck now     Recurrent major depressive disorder, in partial remission ([address]  Fairly well controlled     Obstructive sleep apnea syndrome  Cont cpap    Intractable left heel pain  Eventually will address w foot surgeon    Arthritis of right hip  For replacement         Patient has been seen-history and physical performed and is medically cleared for surgery.

## 2021-03-24 LAB
ESTIMATED AVERAGE GLUCOSE: 134.1 MG/DL
HBA1C MFR BLD: 6.3 %

## 2021-03-25 ENCOUNTER — OFFICE VISIT (OUTPATIENT)
Dept: PRIMARY CARE CLINIC | Age: 71
End: 2021-03-25
Payer: MEDICARE

## 2021-03-25 DIAGNOSIS — Z01.818 PREOP EXAMINATION: Primary | ICD-10-CM

## 2021-03-25 LAB — SARS-COV-2: NOT DETECTED

## 2021-03-25 PROCEDURE — 99211 OFF/OP EST MAY X REQ PHY/QHP: CPT | Performed by: NURSE PRACTITIONER

## 2021-03-25 PROCEDURE — G8417 CALC BMI ABV UP PARAM F/U: HCPCS | Performed by: NURSE PRACTITIONER

## 2021-03-25 PROCEDURE — G8428 CUR MEDS NOT DOCUMENT: HCPCS | Performed by: NURSE PRACTITIONER

## 2021-03-27 LAB — CULTURE NOSE: NORMAL

## 2021-03-29 ENCOUNTER — ANESTHESIA EVENT (OUTPATIENT)
Dept: OPERATING ROOM | Age: 71
End: 2021-03-29
Payer: MEDICARE

## 2021-03-29 RX ORDER — ROPIVACAINE HYDROCHLORIDE 5 MG/ML
30 INJECTION, SOLUTION EPIDURAL; INFILTRATION; PERINEURAL ONCE
Status: CANCELLED | OUTPATIENT
Start: 2021-03-29 | End: 2021-03-29

## 2021-03-29 NOTE — PROGRESS NOTES
[facility] / [facility] ([facility]) [address], [address] [place] [address] 231    Acknowledgment of Informed Consent for Surgical or Medical Procedure and Sedation  I agree to allow doctor(s) [id number]  and his/her associates or assistants, including residents and/or other qualified medical practitioner to perform the following medical treatment or procedure and to administer or direct the administration of sedation as necessary:  Procedure(s): DIRECT ANTERIOR APPROACH RIGHT TOTAL HIP ARTHROPLASTY      My doctor has explained the following regarding the proposed procedure:   the explanation of the procedure   the benefits of the procedure   the potential problems that might occur during recuperation   the risks and side effects of the procedure which could include but are not limited to severe blood loss, infection, stroke or death   the benefits, risks and side effect of alternative procedures including the consequences of declining this procedure or any alternative procedures   the likelihood of achieving satisfactory results. I acknowledge no guarantee or assurance has been made to me regarding the results. I understand that during the course of this treatment/procedure, unforeseen conditions can occur which require an additional or different procedure. I agree to allow my physician or assistants to perform such extension of the original procedure as they may find necessary. I understand that sedation will often result in temporary impairment of memory and fine motor skills and that sedation can occasionally progress to a state of deep sedation or general anesthesia. I understand the risks of anesthesia for surgery include, but are not limited to, sore throat, hoarseness, injury to face, mouth, or teeth; nausea; headache; injury to blood vessels or nerves; death, brain damage, or paralysis.     I understand that if I have a Limitation of Treatment order in effect during my hospitalization, the order may or may not be in effect during this procedure. I give my doctor permission to give me blood or blood products. I understand that there are risks with receiving blood such as hepatitis, AIDS, fever, or allergic reaction. I acknowledge that the risks, benefits, and alternatives of this treatment have been explained to me and that no express or implied warranty has been given by the hospital, any blood bank, or any person or entity as to the blood or blood components transfused. At the discretion of my doctor, I agree to allow observers, equipment/product representatives and allow photographing, and/or televising of the procedure, provided my name or identity is maintained confidentially. I agree the hospital may dispose of or use for scientific or educational purposes any tissue, fluid, or body parts which may be removed.     ________________________________Date________Time______ am/pm  ([place] [facility])  Patient or Signature of Closest Relative or Legal Guardian    ________________________________Date________Time______am/pm      Page 1 of  1  Witness

## 2021-03-30 ENCOUNTER — APPOINTMENT (OUTPATIENT)
Dept: GENERAL RADIOLOGY | Age: 71
End: 2021-03-30
Attending: ORTHOPAEDIC SURGERY
Payer: MEDICARE

## 2021-03-30 ENCOUNTER — ANESTHESIA (OUTPATIENT)
Dept: OPERATING ROOM | Age: 71
End: 2021-03-30
Payer: MEDICARE

## 2021-03-30 ENCOUNTER — HOSPITAL ENCOUNTER (OUTPATIENT)
Age: 71
Setting detail: OBSERVATION
Discharge: HOME | End: 2021-03-30
Attending: ORTHOPAEDIC SURGERY | Admitting: ORTHOPAEDIC SURGERY
Payer: MEDICARE

## 2021-03-30 VITALS
HEART RATE: 62 BPM | TEMPERATURE: 97.2 F | WEIGHT: 268 LBS | DIASTOLIC BLOOD PRESSURE: 66 MMHG | SYSTOLIC BLOOD PRESSURE: 138 MMHG | RESPIRATION RATE: 14 BRPM | BODY MASS INDEX: 39.69 KG/M2 | OXYGEN SATURATION: 98 % | HEIGHT: 69 IN

## 2021-03-30 VITALS — SYSTOLIC BLOOD PRESSURE: 123 MMHG | DIASTOLIC BLOOD PRESSURE: 66 MMHG | OXYGEN SATURATION: 95 %

## 2021-03-30 DIAGNOSIS — M16.11 ARTHRITIS OF RIGHT HIP: Primary | ICD-10-CM

## 2021-03-30 LAB
ABO/RH: NORMAL
ANTIBODY SCREEN: NORMAL
BASOPHILS ABSOLUTE: 0 K/UL (ref 0–0.2)
BASOPHILS RELATIVE PERCENT: 0.3 %
C-REACTIVE PROTEIN: <3 MG/L (ref 0–5.1)
EOSINOPHILS ABSOLUTE: 0.4 K/UL (ref 0–0.6)
EOSINOPHILS RELATIVE PERCENT: 5.1 %
GLUCOSE BLD-MCNC: 108 MG/DL (ref 70–99)
GLUCOSE BLD-MCNC: 140 MG/DL (ref 70–99)
HCT VFR BLD CALC: 39.8 % (ref 36–48)
HEMOGLOBIN: 13.3 G/DL (ref 12–16)
LYMPHOCYTES ABSOLUTE: 1.7 K/UL (ref 1–5.1)
LYMPHOCYTES RELATIVE PERCENT: 22.4 %
MCH RBC QN AUTO: 30.1 PG (ref 26–34)
MCHC RBC AUTO-ENTMCNC: 33.4 G/DL (ref 31–36)
MCV RBC AUTO: 90.1 FL (ref 80–100)
MONOCYTES ABSOLUTE: 0.5 K/UL (ref 0–1.3)
MONOCYTES RELATIVE PERCENT: 6.4 %
NEUTROPHILS ABSOLUTE: 4.9 K/UL (ref 1.7–7.7)
NEUTROPHILS RELATIVE PERCENT: 65.8 %
PDW BLD-RTO: 14.4 % (ref 12.4–15.4)
PERFORMED ON: ABNORMAL
PERFORMED ON: ABNORMAL
PLATELET # BLD: 322 K/UL (ref 135–450)
PMV BLD AUTO: 7.6 FL (ref 5–10.5)
RBC # BLD: 4.41 M/UL (ref 4–5.2)
SEDIMENTATION RATE, ERYTHROCYTE: 44 MM/HR (ref 0–30)
WBC # BLD: 7.4 K/UL (ref 4–11)

## 2021-03-30 PROCEDURE — 85652 RBC SED RATE AUTOMATED: CPT

## 2021-03-30 PROCEDURE — 6360000002 HC RX W HCPCS: Performed by: NURSE ANESTHETIST, CERTIFIED REGISTERED

## 2021-03-30 PROCEDURE — 2720000010 HC SURG SUPPLY STERILE: Performed by: ORTHOPAEDIC SURGERY

## 2021-03-30 PROCEDURE — 2580000003 HC RX 258: Performed by: ORTHOPAEDIC SURGERY

## 2021-03-30 PROCEDURE — 73501 X-RAY EXAM HIP UNI 1 VIEW: CPT

## 2021-03-30 PROCEDURE — 2500000003 HC RX 250 WO HCPCS: Performed by: ORTHOPAEDIC SURGERY

## 2021-03-30 PROCEDURE — 72170 X-RAY EXAM OF PELVIS: CPT

## 2021-03-30 PROCEDURE — 85025 COMPLETE CBC W/AUTO DIFF WBC: CPT

## 2021-03-30 PROCEDURE — 7100000010 HC PHASE II RECOVERY - FIRST 15 MIN: Performed by: ORTHOPAEDIC SURGERY

## 2021-03-30 PROCEDURE — 6360000002 HC RX W HCPCS: Performed by: ORTHOPAEDIC SURGERY

## 2021-03-30 PROCEDURE — 6370000000 HC RX 637 (ALT 250 FOR IP): Performed by: ANESTHESIOLOGY

## 2021-03-30 PROCEDURE — 6360000002 HC RX W HCPCS: Performed by: ANESTHESIOLOGY

## 2021-03-30 PROCEDURE — 2580000003 HC RX 258: Performed by: ANESTHESIOLOGY

## 2021-03-30 PROCEDURE — 97166 OT EVAL MOD COMPLEX 45 MIN: CPT

## 2021-03-30 PROCEDURE — 7100000011 HC PHASE II RECOVERY - ADDTL 15 MIN: Performed by: ORTHOPAEDIC SURGERY

## 2021-03-30 PROCEDURE — 62327 NJX INTERLAMINAR LMBR/SAC: CPT | Performed by: ANESTHESIOLOGY

## 2021-03-30 PROCEDURE — 6370000000 HC RX 637 (ALT 250 FOR IP): Performed by: ORTHOPAEDIC SURGERY

## 2021-03-30 PROCEDURE — 3600000015 HC SURGERY LEVEL 5 ADDTL 15MIN: Performed by: ORTHOPAEDIC SURGERY

## 2021-03-30 PROCEDURE — 97535 SELF CARE MNGMENT TRAINING: CPT

## 2021-03-30 PROCEDURE — 86140 C-REACTIVE PROTEIN: CPT

## 2021-03-30 PROCEDURE — 86850 RBC ANTIBODY SCREEN: CPT

## 2021-03-30 PROCEDURE — 97161 PT EVAL LOW COMPLEX 20 MIN: CPT

## 2021-03-30 PROCEDURE — 86900 BLOOD TYPING SEROLOGIC ABO: CPT

## 2021-03-30 PROCEDURE — 2709999900 HC NON-CHARGEABLE SUPPLY: Performed by: ORTHOPAEDIC SURGERY

## 2021-03-30 PROCEDURE — 3700000000 HC ANESTHESIA ATTENDED CARE: Performed by: ORTHOPAEDIC SURGERY

## 2021-03-30 PROCEDURE — 3700000001 HC ADD 15 MINUTES (ANESTHESIA): Performed by: ORTHOPAEDIC SURGERY

## 2021-03-30 PROCEDURE — 86901 BLOOD TYPING SEROLOGIC RH(D): CPT

## 2021-03-30 PROCEDURE — 3209999900 FLUORO FOR SURGICAL PROCEDURES

## 2021-03-30 PROCEDURE — 2500000003 HC RX 250 WO HCPCS: Performed by: NURSE ANESTHETIST, CERTIFIED REGISTERED

## 2021-03-30 PROCEDURE — 97116 GAIT TRAINING THERAPY: CPT

## 2021-03-30 PROCEDURE — 3600000005 HC SURGERY LEVEL 5 BASE: Performed by: ORTHOPAEDIC SURGERY

## 2021-03-30 PROCEDURE — 7100000000 HC PACU RECOVERY - FIRST 15 MIN: Performed by: ORTHOPAEDIC SURGERY

## 2021-03-30 PROCEDURE — 97530 THERAPEUTIC ACTIVITIES: CPT

## 2021-03-30 PROCEDURE — C1776 JOINT DEVICE (IMPLANTABLE): HCPCS | Performed by: ORTHOPAEDIC SURGERY

## 2021-03-30 PROCEDURE — 7100000001 HC PACU RECOVERY - ADDTL 15 MIN: Performed by: ORTHOPAEDIC SURGERY

## 2021-03-30 PROCEDURE — 2580000003 HC RX 258: Performed by: NURSE ANESTHETIST, CERTIFIED REGISTERED

## 2021-03-30 DEVICE — R3 0 DEGREE XLPE ACETABULAR LINER                                    36MM INNER DIAMETER X OUTER DIAMETER 52MM
Type: IMPLANTABLE DEVICE | Site: HIP | Status: FUNCTIONAL
Brand: R3

## 2021-03-30 DEVICE — POLARSTEM COLLAR STANDARD                                    NON-CEMENTED WITH TI/HA 1
Type: IMPLANTABLE DEVICE | Site: HIP | Status: FUNCTIONAL
Brand: POLARSTEM

## 2021-03-30 DEVICE — HIP H2 TOT ADV OTHER HD IMPL CAPPED H2 SN: Type: IMPLANTABLE DEVICE | Site: HIP | Status: FUNCTIONAL

## 2021-03-30 DEVICE — OXINIUM FEMORAL HEAD 12/14 TAPER                                    36 MM M/+4
Type: IMPLANTABLE DEVICE | Site: HIP | Status: FUNCTIONAL
Brand: OXINIUM

## 2021-03-30 DEVICE — R3 3 HOLE ACETABULAR SHELL 52MM
Type: IMPLANTABLE DEVICE | Site: HIP | Status: FUNCTIONAL
Brand: R3 ACETABULAR

## 2021-03-30 RX ORDER — TAMSULOSIN HYDROCHLORIDE 0.4 MG/1
0.4 CAPSULE ORAL DAILY
Status: CANCELLED | OUTPATIENT
Start: 2021-03-30

## 2021-03-30 RX ORDER — ATORVASTATIN CALCIUM 40 MG/1
40 TABLET, FILM COATED ORAL NIGHTLY
Status: CANCELLED | OUTPATIENT
Start: 2021-03-30

## 2021-03-30 RX ORDER — OXYCODONE HYDROCHLORIDE 5 MG/1
5 TABLET ORAL EVERY 6 HOURS PRN
Qty: 28 TABLET | Refills: 0 | Status: SHIPPED | OUTPATIENT
Start: 2021-03-30 | End: 2021-04-06

## 2021-03-30 RX ORDER — OXYCODONE HYDROCHLORIDE 5 MG/1
5 TABLET ORAL EVERY 4 HOURS PRN
Status: CANCELLED | OUTPATIENT
Start: 2021-03-30

## 2021-03-30 RX ORDER — SODIUM CHLORIDE, SODIUM LACTATE, POTASSIUM CHLORIDE, CALCIUM CHLORIDE 600; 310; 30; 20 MG/100ML; MG/100ML; MG/100ML; MG/100ML
INJECTION, SOLUTION INTRAVENOUS CONTINUOUS PRN
Status: DISCONTINUED | OUTPATIENT
Start: 2021-03-30 | End: 2021-03-30 | Stop reason: SDUPTHER

## 2021-03-30 RX ORDER — DILTIAZEM HYDROCHLORIDE 120 MG/1
120 CAPSULE, COATED, EXTENDED RELEASE ORAL DAILY
Status: CANCELLED | OUTPATIENT
Start: 2021-03-30

## 2021-03-30 RX ORDER — DEXAMETHASONE SODIUM PHOSPHATE 4 MG/ML
4 INJECTION, SOLUTION INTRA-ARTICULAR; INTRALESIONAL; INTRAMUSCULAR; INTRAVENOUS; SOFT TISSUE ONCE
Status: COMPLETED | OUTPATIENT
Start: 2021-03-30 | End: 2021-03-30

## 2021-03-30 RX ORDER — METOCLOPRAMIDE HYDROCHLORIDE 5 MG/ML
10 INJECTION INTRAMUSCULAR; INTRAVENOUS
Status: DISCONTINUED | OUTPATIENT
Start: 2021-03-30 | End: 2021-03-30 | Stop reason: HOSPADM

## 2021-03-30 RX ORDER — SODIUM CHLORIDE, SODIUM LACTATE, POTASSIUM CHLORIDE, CALCIUM CHLORIDE 600; 310; 30; 20 MG/100ML; MG/100ML; MG/100ML; MG/100ML
INJECTION, SOLUTION INTRAVENOUS CONTINUOUS
Status: DISCONTINUED | OUTPATIENT
Start: 2021-03-30 | End: 2021-03-30 | Stop reason: HOSPADM

## 2021-03-30 RX ORDER — METFORMIN HYDROCHLORIDE 500 MG/1
500 TABLET, EXTENDED RELEASE ORAL 2 TIMES DAILY WITH MEALS
Status: CANCELLED | OUTPATIENT
Start: 2021-03-30

## 2021-03-30 RX ORDER — OXYCODONE HYDROCHLORIDE 5 MG/1
10 TABLET ORAL PRN
Status: COMPLETED | OUTPATIENT
Start: 2021-03-30 | End: 2021-03-30

## 2021-03-30 RX ORDER — METHOCARBAMOL 500 MG/1
500 TABLET, FILM COATED ORAL 4 TIMES DAILY
Status: CANCELLED | OUTPATIENT
Start: 2021-03-30

## 2021-03-30 RX ORDER — LEVOTHYROXINE SODIUM 0.03 MG/1
25 TABLET ORAL DAILY
Status: CANCELLED | OUTPATIENT
Start: 2021-03-30

## 2021-03-30 RX ORDER — ACETAMINOPHEN 500 MG
1000 TABLET ORAL EVERY 8 HOURS SCHEDULED
Status: CANCELLED | OUTPATIENT
Start: 2021-03-30

## 2021-03-30 RX ORDER — HYDRALAZINE HYDROCHLORIDE 20 MG/ML
5 INJECTION INTRAMUSCULAR; INTRAVENOUS EVERY 10 MIN PRN
Status: DISCONTINUED | OUTPATIENT
Start: 2021-03-30 | End: 2021-03-30 | Stop reason: HOSPADM

## 2021-03-30 RX ORDER — ACETAMINOPHEN 500 MG
1000 TABLET ORAL ONCE
Status: COMPLETED | OUTPATIENT
Start: 2021-03-30 | End: 2021-03-30

## 2021-03-30 RX ORDER — BUPIVACAINE HYDROCHLORIDE 2.5 MG/ML
INJECTION, SOLUTION EPIDURAL; INFILTRATION; INTRACAUDAL PRN
Status: DISCONTINUED | OUTPATIENT
Start: 2021-03-30 | End: 2021-03-30 | Stop reason: ALTCHOICE

## 2021-03-30 RX ORDER — DIPHENHYDRAMINE HYDROCHLORIDE 50 MG/ML
12.5 INJECTION INTRAMUSCULAR; INTRAVENOUS
Status: DISCONTINUED | OUTPATIENT
Start: 2021-03-30 | End: 2021-03-30 | Stop reason: HOSPADM

## 2021-03-30 RX ORDER — LIDOCAINE HYDROCHLORIDE 20 MG/ML
INJECTION, SOLUTION INFILTRATION; PERINEURAL PRN
Status: DISCONTINUED | OUTPATIENT
Start: 2021-03-30 | End: 2021-03-30 | Stop reason: SDUPTHER

## 2021-03-30 RX ORDER — SENNA AND DOCUSATE SODIUM 50; 8.6 MG/1; MG/1
1 TABLET, FILM COATED ORAL 2 TIMES DAILY
Status: CANCELLED | OUTPATIENT
Start: 2021-03-30

## 2021-03-30 RX ORDER — SODIUM CHLORIDE 9 MG/ML
INJECTION, SOLUTION INTRAVENOUS CONTINUOUS
Status: CANCELLED | OUTPATIENT
Start: 2021-03-30

## 2021-03-30 RX ORDER — ONDANSETRON 2 MG/ML
INJECTION INTRAMUSCULAR; INTRAVENOUS PRN
Status: DISCONTINUED | OUTPATIENT
Start: 2021-03-30 | End: 2021-03-30 | Stop reason: SDUPTHER

## 2021-03-30 RX ORDER — PANTOPRAZOLE SODIUM 40 MG/1
40 TABLET, DELAYED RELEASE ORAL
Status: CANCELLED | OUTPATIENT
Start: 2021-03-30

## 2021-03-30 RX ORDER — MIDAZOLAM HYDROCHLORIDE 1 MG/ML
2 INJECTION INTRAMUSCULAR; INTRAVENOUS ONCE
Status: COMPLETED | OUTPATIENT
Start: 2021-03-30 | End: 2021-03-30

## 2021-03-30 RX ORDER — ASPIRIN 81 MG/1
81 TABLET ORAL DAILY
Status: CANCELLED | OUTPATIENT
Start: 2021-03-31

## 2021-03-30 RX ORDER — BUPROPION HYDROCHLORIDE 150 MG/1
150 TABLET ORAL DAILY
Status: CANCELLED | OUTPATIENT
Start: 2021-03-30

## 2021-03-30 RX ORDER — FENTANYL CITRATE 50 UG/ML
100 INJECTION, SOLUTION INTRAMUSCULAR; INTRAVENOUS ONCE
Status: COMPLETED | OUTPATIENT
Start: 2021-03-30 | End: 2021-03-30

## 2021-03-30 RX ORDER — MEPERIDINE HYDROCHLORIDE 25 MG/ML
12.5 INJECTION INTRAMUSCULAR; INTRAVENOUS; SUBCUTANEOUS EVERY 5 MIN PRN
Status: DISCONTINUED | OUTPATIENT
Start: 2021-03-30 | End: 2021-03-30 | Stop reason: HOSPADM

## 2021-03-30 RX ORDER — PROMETHAZINE HYDROCHLORIDE 25 MG/ML
6.25 INJECTION, SOLUTION INTRAMUSCULAR; INTRAVENOUS
Status: DISCONTINUED | OUTPATIENT
Start: 2021-03-30 | End: 2021-03-30 | Stop reason: HOSPADM

## 2021-03-30 RX ORDER — SODIUM CHLORIDE 0.9 % (FLUSH) 0.9 %
10 SYRINGE (ML) INJECTION EVERY 12 HOURS SCHEDULED
Status: CANCELLED | OUTPATIENT
Start: 2021-03-30

## 2021-03-30 RX ORDER — MAGNESIUM HYDROXIDE 1200 MG/15ML
LIQUID ORAL CONTINUOUS PRN
Status: COMPLETED | OUTPATIENT
Start: 2021-03-30 | End: 2021-03-30

## 2021-03-30 RX ORDER — LABETALOL HYDROCHLORIDE 5 MG/ML
5 INJECTION, SOLUTION INTRAVENOUS EVERY 10 MIN PRN
Status: DISCONTINUED | OUTPATIENT
Start: 2021-03-30 | End: 2021-03-30 | Stop reason: HOSPADM

## 2021-03-30 RX ORDER — MORPHINE SULFATE 4 MG/ML
1 INJECTION, SOLUTION INTRAMUSCULAR; INTRAVENOUS EVERY 5 MIN PRN
Status: DISCONTINUED | OUTPATIENT
Start: 2021-03-30 | End: 2021-03-30 | Stop reason: HOSPADM

## 2021-03-30 RX ORDER — OXYCODONE HYDROCHLORIDE 5 MG/1
10 TABLET ORAL EVERY 4 HOURS PRN
Status: CANCELLED | OUTPATIENT
Start: 2021-03-30

## 2021-03-30 RX ORDER — EPHEDRINE SULFATE 50 MG/ML
INJECTION INTRAVENOUS PRN
Status: DISCONTINUED | OUTPATIENT
Start: 2021-03-30 | End: 2021-03-30 | Stop reason: SDUPTHER

## 2021-03-30 RX ORDER — SODIUM CHLORIDE 0.9 % (FLUSH) 0.9 %
10 SYRINGE (ML) INJECTION PRN
Status: CANCELLED | OUTPATIENT
Start: 2021-03-30

## 2021-03-30 RX ORDER — OLMESARTAN MEDOXOMIL AND HYDROCHLOROTHIAZIDE 40/12.5 40; 12.5 MG/1; MG/1
1 TABLET ORAL DAILY
Status: CANCELLED | OUTPATIENT
Start: 2021-03-30

## 2021-03-30 RX ORDER — PROPOFOL 10 MG/ML
INJECTION, EMULSION INTRAVENOUS CONTINUOUS PRN
Status: DISCONTINUED | OUTPATIENT
Start: 2021-03-30 | End: 2021-03-30 | Stop reason: SDUPTHER

## 2021-03-30 RX ORDER — OXYCODONE HCL 10 MG/1
10 TABLET, FILM COATED, EXTENDED RELEASE ORAL ONCE
Status: COMPLETED | OUTPATIENT
Start: 2021-03-30 | End: 2021-03-30

## 2021-03-30 RX ORDER — OXYCODONE HYDROCHLORIDE 5 MG/1
5 TABLET ORAL PRN
Status: COMPLETED | OUTPATIENT
Start: 2021-03-30 | End: 2021-03-30

## 2021-03-30 RX ORDER — ONDANSETRON 2 MG/ML
4 INJECTION INTRAMUSCULAR; INTRAVENOUS EVERY 6 HOURS PRN
Status: CANCELLED | OUTPATIENT
Start: 2021-03-30

## 2021-03-30 RX ORDER — METOPROLOL SUCCINATE 50 MG/1
100 TABLET, EXTENDED RELEASE ORAL DAILY
Status: CANCELLED | OUTPATIENT
Start: 2021-03-30

## 2021-03-30 RX ADMIN — HYDROMORPHONE HYDROCHLORIDE 0.5 MG: 1 INJECTION, SOLUTION INTRAMUSCULAR; INTRAVENOUS; SUBCUTANEOUS at 12:30

## 2021-03-30 RX ADMIN — TRANEXAMIC ACID 1000 MG: 1 INJECTION, SOLUTION INTRAVENOUS at 08:41

## 2021-03-30 RX ADMIN — LIDOCAINE HYDROCHLORIDE 10 ML: 20 INJECTION, SOLUTION INFILTRATION; PERINEURAL at 08:38

## 2021-03-30 RX ADMIN — EPHEDRINE SULFATE 10 MG: 50 INJECTION INTRAVENOUS at 08:43

## 2021-03-30 RX ADMIN — EPHEDRINE SULFATE 10 MG: 50 INJECTION INTRAVENOUS at 09:13

## 2021-03-30 RX ADMIN — LIDOCAINE HYDROCHLORIDE 3 ML: 20 INJECTION, SOLUTION INFILTRATION; PERINEURAL at 09:38

## 2021-03-30 RX ADMIN — EPHEDRINE SULFATE 10 MG: 50 INJECTION INTRAVENOUS at 08:55

## 2021-03-30 RX ADMIN — DEXAMETHASONE SODIUM PHOSPHATE 4 MG: 4 INJECTION, SOLUTION INTRAMUSCULAR; INTRAVENOUS at 07:03

## 2021-03-30 RX ADMIN — EPHEDRINE SULFATE 10 MG: 50 INJECTION INTRAVENOUS at 09:07

## 2021-03-30 RX ADMIN — CEFAZOLIN 3 G: 10 INJECTION, POWDER, FOR SOLUTION INTRAVENOUS at 08:37

## 2021-03-30 RX ADMIN — SODIUM CHLORIDE, POTASSIUM CHLORIDE, SODIUM LACTATE AND CALCIUM CHLORIDE: 600; 310; 30; 20 INJECTION, SOLUTION INTRAVENOUS at 06:59

## 2021-03-30 RX ADMIN — VANCOMYCIN HYDROCHLORIDE 1750 MG: 10 INJECTION, POWDER, LYOPHILIZED, FOR SOLUTION INTRAVENOUS at 07:03

## 2021-03-30 RX ADMIN — OXYCODONE HYDROCHLORIDE 10 MG: 10 TABLET, FILM COATED, EXTENDED RELEASE ORAL at 06:50

## 2021-03-30 RX ADMIN — ONDANSETRON 4 MG: 2 INJECTION INTRAMUSCULAR; INTRAVENOUS at 08:30

## 2021-03-30 RX ADMIN — SODIUM CHLORIDE, SODIUM LACTATE, POTASSIUM CHLORIDE, AND CALCIUM CHLORIDE: .6; .31; .03; .02 INJECTION, SOLUTION INTRAVENOUS at 08:49

## 2021-03-30 RX ADMIN — MIDAZOLAM HYDROCHLORIDE 2 MG: 2 INJECTION, SOLUTION INTRAMUSCULAR; INTRAVENOUS at 07:31

## 2021-03-30 RX ADMIN — ACETAMINOPHEN 1000 MG: 500 TABLET, FILM COATED ORAL at 06:50

## 2021-03-30 RX ADMIN — EPHEDRINE SULFATE 10 MG: 50 INJECTION INTRAVENOUS at 08:51

## 2021-03-30 RX ADMIN — HYDROMORPHONE HYDROCHLORIDE 0.5 MG: 1 INJECTION, SOLUTION INTRAMUSCULAR; INTRAVENOUS; SUBCUTANEOUS at 11:40

## 2021-03-30 RX ADMIN — OXYCODONE 5 MG: 5 TABLET ORAL at 14:04

## 2021-03-30 RX ADMIN — PROPOFOL 75 MCG/KG/MIN: 10 INJECTION, EMULSION INTRAVENOUS at 08:49

## 2021-03-30 RX ADMIN — FENTANYL CITRATE 100 MCG: 50 INJECTION INTRAMUSCULAR; INTRAVENOUS at 07:31

## 2021-03-30 ASSESSMENT — PULMONARY FUNCTION TESTS
PIF_VALUE: 1
PIF_VALUE: 0
PIF_VALUE: 1
PIF_VALUE: 0
PIF_VALUE: 1
PIF_VALUE: 4
PIF_VALUE: 1
PIF_VALUE: 0
PIF_VALUE: 1
PIF_VALUE: 0
PIF_VALUE: 1
PIF_VALUE: 0
PIF_VALUE: 1
PIF_VALUE: 0
PIF_VALUE: 1
PIF_VALUE: 0
PIF_VALUE: 1
PIF_VALUE: 0
PIF_VALUE: 1

## 2021-03-30 ASSESSMENT — PAIN DESCRIPTION - LOCATION: LOCATION: HIP

## 2021-03-30 ASSESSMENT — PAIN DESCRIPTION - DESCRIPTORS: DESCRIPTORS: BURNING;ACHING;CONSTANT

## 2021-03-30 ASSESSMENT — PAIN SCALES - GENERAL
PAINLEVEL_OUTOF10: 5
PAINLEVEL_OUTOF10: 8
PAINLEVEL_OUTOF10: 5

## 2021-03-30 ASSESSMENT — PAIN DESCRIPTION - PAIN TYPE: TYPE: SURGICAL PAIN

## 2021-03-30 NOTE — ANESTHESIA PROCEDURE NOTES
Epidural Block    Patient location during procedure: pre-op  Start time: 3/30/2021 7:31 AM  End time: 3/30/2021 7:42 AM  Staffing  Performed: anesthesiologist   Anesthesiologist: Faye Hughes MD  Preanesthetic Checklist  Completed: patient identified, IV checked, site marked, risks and benefits discussed, surgical consent, monitors and equipment checked, pre-op evaluation, timeout performed, anesthesia consent given, oxygen available and patient being monitored  Epidural  Patient position: sitting  Prep: ChloraPrep  Patient monitoring: cardiac monitor, continuous pulse ox and frequent blood pressure checks  Approach: right paramedian  Location: lumbar (1-5)  Injection technique: CAROLYN air  Provider prep: mask and sterile gloves  Needle  Needle type: Tuohy   Needle gauge: 17 G  Needle length: 3.5 in  Needle insertion depth: 10 cm  Catheter type: stylet  Catheter size: 19 G  Catheter at skin depth: 20 cm  Test dose: negative  Kit: Perifix FX Continuous Epidural Anesthesia Tray  Lot number: [id number]  Expiration date: 1/1/2022  Assessment  Hemodynamics: stable  Attempts: 1  Additional Notes  Epidural Note    Seated position. Landmarks identified. Sterile prep and drape. Lidocaine 1% skin wheal at L3-4.  17G Tuohy passed via right paramedian approach after one failed midline attempt at the same level; loss of resistance at 10cm. Catheter passed easily to 20cm. Tuohy removed. Catheter secured. Lidocaine 1.5% with Epinephrine 1:200,000 administered to 5cc total at conclusion of procedure. No apparent complications at the time of the procedure. Nishant Harris.  Eddie Palacios MD  March 30, 2021 7:58 AM

## 2021-03-30 NOTE — OP NOTE
PREOPERATIVE DIAGNOSIS: Right hip arthritis. POSTOPERATIVE DIAGNOSIS: Right hip arthritis. OPERATION PERFORMED: Right total hip arthroplasty, direct anterior supine under  fluoroscopic guidance. ATTENDING SURGEON: Ivania Jason MD    FIRST SURGICAL ASSISTANT: Jered Landeros, [facility]. The Physician Assistant was necessary to perform the surgery today by assisting with application of implants and manipulation of the extremity. This help was not otherwise available in the operating room today. ANESTHESIA: Epidural plus 40 cc 0.25% Marcaine     ESTIMATED BLOOD LOSS: 300 mL. INTRAVENOUS FLUIDS: 800 mL crystalloid. URINE OUTPUT: 0.    ANTIBIOTICS: 2G Cefazolin + 1G Vancomycin     COMPLICATIONS: None. IMPLANTS:   1. Pringle and Nephew R3 52 mm outer diameter acetabular shell. 2. R3 XLPE polyethylene acetabular liner, 36 mm inner diameter neutral.  3. Smith and Nephew Polar femoral stem 1 standard with collar. 4. Oxinium femoral head 36 mm outer diameter +4 offset     OPERATIVE INDICATIONS: This is a patient with longstanding  hip pain. They had radiographic evidence of hip arthritis. They were initially managed with nonoperative measures. After failing non-operative management, total hip arthroplasty was discussed with the patient. We discussed the risk adherent  to the anterior approach including but not limited to injury from on the [employer]  table, anterior rather than posterior dislocation, damage to the anterior  structures. General risk of total hip arthroplasty including to dislocation,  neurovascular damage, infection, need for further surgery or even loss of life  or limb were discussed. Despite these risks and others, the patient elected  to proceed. OPERATIVE DETAILS: The patient was greeted in the preoperative holding area. The operative hip was marked with a marker. They were brought to the operating room  where general anesthesia was obtained.  They were placed on the [facility] reaming. Under direct fluoroscopic  guidance, I reamed sequentially to a size 52 and then opened a 52 mm shell and impacted it into place. I grabbed this with a Kocher clamp and was able to clearly  demonstrate that it had excellent fixation and I did not feel it needed screw  fixation. A 36 mm inner diameter liner was opened and impacted into place. I  was pleased with this and I turned my attention to the femur. I placed the hook for the [place] table just superior to the vastus lateralis underneath the  femur, and then elevated it and clamped it into the table. A retractor was  placed over the medial femur, the leg was brought into once again extension  and adduction, and full external rotation to approximately 110 degrees. I was  able to expose the femur. I then released the capsule superiorly in order to  get full femoral exposure, I used the box osteotome, followed by canal finder  and placed a broach into the femur and brought the femur back  up removing my retractors and took a fluoroscopic image to verify that I was  within the canal. I was pleased with this. I reset my exposure and then I  broached with sequentially to a size 1 stem. I calcar planed and had a nice flat cut. I then came down, I trialed this under fluoroscopic guidance. I had good leg length and I opened  the 1 STD stem. I reset my retraction and my position of my femur. I impacted  the stem to the level of my femoral neck osteotomy. After trialing once again I opened the size +4 head. This was impacted onto a dry [place]  taper. I reduced the hip, took fluoroscopic images of the bilateral hips and  the pelvis to verify my leg length. I was pleased with leg length and offset. I dropped the hip into extension and external rotation and it did not  dislocate on the table. I was pleased with this and brought it back up into  neutral, took all traction off.     The wound was soaked in a dilute betadine solution followed by pulsatile irrigation with 3 L of sterile saline with bacitracin. I then turned my attention to closure of the wound. The fascia of the TFL was closed with interrupted 0 vicryl oversewn with a running #2 stratafix suture. A local anesthetic mixture was injected into the muscle, deep and subcutaneous tissue followed by 0 vicryl in the subcutaneous tissue. 2-0 vicryl was placed in a buried interrupted fashion followed by skin staples and a negative pressure wound therapy device. The patient was extubated, transferred to a hospital bed and transported to the post-operative anesthesia care unit in stable condition. All sponge and needle counts were correct x 2.

## 2021-03-30 NOTE — H&P
Chase Jeff    [id number]    [facility] Same Day Surgery Update H & P  Department of General Surgery   Surgical Service   Pre-operative History and Physical  Last H & P within the last 30 days. DIAGNOSIS:   Primary osteoarthritis of right hip [M16.11]    Procedure(s):  DIRECT ANTERIOR APPROACH RIGHT TOTAL HIP ARTHROPLASTY    HISTORY OF PRESENT ILLNESS:   Patient is a 79 y.o. female with chronic right hip pain and limited ROM in the setting of arthrosis. The symptoms have been recalcitrant to conservative treatment and the patient presents today for the above procedure. Covid 19:  Patient denies fever, chills, cough or known exposure to Covid-19. Past Medical History:        Diagnosis Date    Arthritis     shoulders hips knees    Dental crowns present     Diabetes mellitus ([address]     Hyperlipidemia     Hypertension     THERESA on CPAP     Pericarditis 9/25/2018    Onset 2008 w 2 hospitalizations Treated w medrol prn for flares     Thyroid disease      Past Surgical History:        Procedure Laterality Date    COLONOSCOPY      ENDOSCOPY, COLON, DIAGNOSTIC      EYE SURGERY Bilateral     cataracts    HYSTERECTOMY      JOINT REPLACEMENT Left 2014    JOINT REPLACEMENT Bilateral 2016    knees    SHOULDER ARTHROPLASTY Bilateral 2013/2018    GONSALO AND BSO  2001     Past Social History:  Social History     Socioeconomic History    Marital status:      Spouse name: None    Number of children: None    Years of education: None    Highest education level: None   Occupational History    None   Social Needs    Financial resource strain: None    Food insecurity     Worry: Never true     Inability: Never true    Transportation needs     Medical: No     Non-medical: No   Tobacco Use    Smoking status: Never Smoker    Smokeless tobacco: Never Used   Substance and Sexual Activity    Alcohol use:  Yes     Alcohol/week: 2.0 standard drinks     Types: 1 Glasses of wine, 1 Shots of liquor per week     Comment: 2-3 times a week     Drug use: None    Sexual activity: None   Lifestyle    Physical activity     Days per week: None     Minutes per session: None    Stress: None   Relationships    Social connections     Talks on phone: None     Gets together: None     Attends [employer] service: None     Active member of club or organization: None     Attends meetings of clubs or organizations: None     Relationship status: None    Intimate partner violence     Fear of current or ex partner: None     Emotionally abused: None     Physically abused: None     Forced sexual activity: None   Other Topics Concern    None   Social History Narrative    None         Medications Prior to Admission:      Prior to Admission medications    Medication Sig Start Date End Date Taking?  Authorizing Provider   metFORMIN (GLUCOPHAGE-XR) 500 MG extended release tablet TAKE 1 TABLET TWICE A DAY  Patient taking differently: Take 500 mg by mouth TAKE TWO (2) TWICE DAILY 1/28/21  Yes Vania Davis MD   buPROPion (WELLBUTRIN XL) 150 MG extended release tablet TAKE 1 TABLET EVERY MORNING 12/1/20  Yes Vania Davis MD   omeprazole (PRILOSEC) 40 MG delayed release capsule Take 1 capsule by mouth daily 12/1/20  Yes Vania Davis MD   atorvastatin (LIPITOR) 40 MG tablet TAKE 1 TABLET EVERY DAY  Patient taking differently: nightly TAKE 1 TABLET EVERY DAY 12/1/20  Yes Vania Davis MD   metoprolol succinate (TOPROL XL) 100 MG extended release tablet TAKE 1 TABLET EVERY DAY 12/1/20  Yes Vania Davis MD   dilTIAZem (CARDIZEM CD) 120 MG extended release capsule Take 1 capsule by mouth daily 12/1/20  Yes Vania Davis MD   levothyroxine (SYNTHROID) 25 MCG tablet Take 1 tablet by mouth Daily 12/1/20  Yes Vania Davis MD   olmesartan-hydroCHLOROthiazide (BENICAR HCT) 40-12.5 MG per tablet Take 1 tablet by mouth daily 12/1/20  Yes Vania Davis MD   Multiple Vitamins-Minerals (PRESERVISION AREDS 2 PO) Take by mouth 2 times daily    Historical Provider, MD   metroNIDAZOLE (METROCREAM) 0.75 % cream Daily 11/9/20   Historical Provider, MD   meloxicam (MOBIC) 15 MG tablet Take 1 tablet by mouth daily 12/1/20   Feroz Jean-Baptiste MD   Omega-3 Fatty Acids (FISH OIL) 1000 MG CAPS Take 2 capsules by mouth 2 times daily 12/1/20   Feroz Jean-Baptiste MD   aspirin 81 MG tablet Take 81 mg by mouth daily    Historical Provider, MD         Allergies:  Gluten meal, Other, and Eggs or egg-derived products    PHYSICAL EXAM:      BP (!) 170/82   Pulse 75   Temp 97.9 °F (36.6 °C) (Oral)   Resp 20   Ht 5' 9\" (1.753 m)   Wt 268 lb (121.6 kg)   SpO2 95%   BMI 39.58 kg/m²      Airway:  Airway patent with no audible stridor    Heart:  Regular rate and rhythm, No murmur noted    Lungs:  No increased work of breathing, good air exchange, clear to auscultation bilaterally, no crackles or wheezing    Abdomen:  Soft, non-distended, non-tender, normal active bowel sounds, no masses palpated    ASSESSMENT AND PLAN    Patient is a 79 y.o. female with above specified procedure planned. 1.  The patients history and physical was obtained and signed off by the pre-admission testing department. Patient seen and focused exam done today- no new changes since last physical exam on 3/23/21     2. Access to ancillary services are available per request of the provider.     NICKI Connor - CNP     3/30/2021

## 2021-03-30 NOTE — PROGRESS NOTES
Occupational Therapy   Occupational Therapy Initial Assessment, Tx, D/C  Date: 3/30/2021   Patient Name: Thaddeus Alcaraz  MRN: [id number]     : 1950    Date of Service: 3/30/2021    Discharge Recommendations: Thaddeus Alcaraz scored a 20/24 on the AM-PAC ADL Inpatient form. Current research shows that an AM-PAC score of 18 or greater is typically associated with a discharge to the patient's home setting. Based on the patient's AM-PAC score, and their current ADL deficits, it is recommended that the patient have 2-3 sessions per week of Occupational Therapy at d/c to increase the patient's independence. At this time, this patient demonstrates the endurance and safety to discharge home with (home vs OP services) and a follow up treatment frequency of 2-3x/wk. Please see assessment section for further patient specific details. If patient discharges prior to next session this note will serve as a discharge summary. Please see below for the latest assessment towards goals. OT Equipment Recommendations  Equipment Needed: Yes  Mobility Devices: ADL Assistive Devices  ADL Assistive Devices: Shower Chair with back    Assessment   Assessment: Pt from home with spouse who is available to assist. Reporting prev ind with fxl mobility, transfers, ADL. Pt moving well this date with CGA and RW. Some assist with LB dressing. Able to complete toileting, simple grooming tasks. No further acute care OT needs at this time. D/C acute OT.   Treatment Diagnosis: impaired mobility, transfers, ADL  Prognosis: Good  Decision Making: Medium Complexity  OT Education: OT Role;Plan of Care;Transfer Training;ADL Adaptive Strategies  Patient Education: verb understanding  REQUIRES OT FOLLOW UP: No  Activity Tolerance  Activity Tolerance: Patient Tolerated treatment well           Patient Diagnosis(es): The encounter diagnosis was Arthritis of right hip.     has a past medical history of Arthritis, Dental crowns present, Diabetes mellitus ([facility] [address] Hyperlipidemia, Hypertension, THERESA on CPAP, Pericarditis, and Thyroid disease. has a past surgical history that includes ayad and bso (cervix removed) (2001); Total shoulder arthroplasty (Bilateral, 2013/2018); joint replacement (Left, 2014); joint replacement (Bilateral, 2016); Hysterectomy; eye surgery (Bilateral); Colonoscopy; and Endoscopy, colon, diagnostic. Treatment Diagnosis: impaired mobility, transfers, ADL      Restrictions  Position Activity Restriction  Other position/activity restrictions: Progressive mobility with assist, FWBAT, ant hip precautions + no SLR    Subjective   General  Chart Reviewed: Yes  Patient assessed for rehabilitation services?: Yes  Additional Pertinent Hx: Patient is a 79 y.o. female with chronic right hip pain and limited ROM in the setting of arthrosis. DIRECT ANTERIOR APPROACH RIGHT TOTAL HIP ARTHROPLASTY.   Referring Practitioner: Rakel Hu MD  Diagnosis: Primary osteoarthritis of right hip  Subjective  Subjective: pt in bed upon arrival, agreeable to therapy session  Pain Assessment  Pain Assessment: 0-10  Pain Level: 5  Patient's Stated Pain Goal: 5  Non-Pharmaceutical Pain Intervention(s): Cold applied  Response to Pain Intervention: Asleep with RR greater than 10  Vital Signs  Pulse: 59  Resp: 12  BP: 138/65  MAP (mmHg): 84  Oxygen Therapy  SpO2: 98 %  O2 Device: None (Room air)  Patient Observation  Observations: pt/ot here to evaluate  Social/Functional History  Social/Functional History  Lives With: Spouse  Type of Home: House  Home Layout: Two level  Home Access: Stairs to enter with rails  Entrance Stairs - Number of Steps: 3  Entrance Stairs - Rails: Both  Bathroom Equipment: Grab bars in shower, Hand-held shower  Home Equipment: Rolling walker, Reacher, Sock aid, Cane(raised toilet seat)  ADL Assistance: Independent(some assist with socks)  Homemaking Assistance: Independent  Homemaking Responsibilities: Yes  Ambulation Assistance: Independent(cane)  Transfer Assistance: Independent  Active : Yes  Occupation: Retired  Leisure & Hobbies: reading  Additional Comments: no falls to report       Objective   Vision: Impaired  Vision Exceptions: Wears glasses at all times(macular degeneration)  Hearing: Exceptions to [facility]  Hearing Exceptions: Hard of hearing/hearing concerns    Orientation  Overall Orientation Status: Within Normal Limits     Balance  Sitting Balance: Supervision  Standing Balance: Contact guard assistance  Standing Balance  Time: ~10 min  Activity: to and from bathroom, mobility in hallway, stairs  Functional Mobility  Functional - Mobility Device: Rolling Walker  Activity: To/from bathroom; Other  Assist Level: Contact guard assistance  Functional Mobility Comments: Pt CGA with fxl mobility, steady gait, no LOB, vc for sequencing, precautions  Toilet Transfers  Toilet - Technique: Ambulating  Equipment Used: Standard toilet  Toilet Transfer: Contact guard assistance  ADL  Feeding: Independent; Beverage management  Grooming: Stand by assistance(stance at sink wash hands)  LE Dressing:  Moderate assistance  Toileting: Supervision;Stand by assistance        Bed mobility  Supine to Sit: Stand by assistance  Sit to Supine: Stand by assistance  Scooting: Stand by assistance  Transfers  Sit to stand: Contact guard assistance  Stand to sit: Contact guard assistance     Cognition  Overall Cognitive Status: WNL                 LUE AROM (degrees)  LUE AROM : WFL  Left Hand AROM (degrees)  Left Hand AROM: WFL  RUE AROM (degrees)  RUE AROM : WFL  Right Hand AROM (degrees)  Right Hand AROM: WFL  LUE Strength  Gross LUE Strength: WFL  RUE Strength  Gross RUE Strength: WFL                  AM-PAC Score        AM-PAC Inpatient Daily Activity Raw Score: 20 (03/30/21 1433)  AM-PAC Inpatient ADL T-Scale Score : 42.03 (03/30/21 1433)  ADL Inpatient CMS 0-100% Score: 38.32 (03/30/21 1433)  ADL Inpatient CMS G-Code Modifier : Tony Ortega (03/30/21 1433) Therapy Time   Individual Concurrent Group Co-treatment   Time In 8059         Time Out 1347         Minutes 42           Timed Code Treatment Minutes:   30    Total Treatment Minutes:  [address], [address], [place], OTR/L

## 2021-03-30 NOTE — ANESTHESIA POSTPROCEDURE EVALUATION
Department of Anesthesiology  Postprocedure Note    Patient: Serena Brewer  MRN: [id number]  YOB: 1950  Date of evaluation: 3/30/2021  Time:  3:49 PM     Procedure Summary     Date: 03/30/21 Room / Location: [address]    Anesthesia Start: 0856 Anesthesia Stop: 4073    Procedure: DIRECT ANTERIOR APPROACH RIGHT TOTAL HIP ARTHROPLASTY (Right ) Diagnosis:       Primary osteoarthritis of right hip      (Primary osteoarthritis of right hip [M16.11])    Surgeons: Rian Noel MD Responsible Provider: Faye Hughes MD    Anesthesia Type: epidural ASA Status: 3          Anesthesia Type: epidural    June Phase I: June Score: 8    June Phase II: June Score: 10    Last vitals: Reviewed and per EMR flowsheets.        Anesthesia Post Evaluation    Patient location during evaluation: PACU  Patient participation: complete - patient participated  Level of consciousness: awake and alert  Pain score: 5  Airway patency: patent  Nausea & Vomiting: no nausea and no vomiting  Complications: no  Cardiovascular status: hemodynamically stable  Respiratory status: acceptable  Hydration status: euvolemic

## 2021-03-30 NOTE — PROGRESS NOTES
Physical Therapy    Facility/Department: [facility] GENERAL SURGERY  Initial Assessment/Treatment/Discharge Summary     NAME: Will Christina  : 1950  MRN: [id number]    Date of Service: 3/30/2021    Discharge Recommendations:    Will Christina scored a 18/24 on the AM-PAC short mobility form. Current research shows that an AM-PAC score of 18 or greater is typically associated with a discharge to the patient's home setting. Based on the patient's AM-PAC score and their current functional mobility deficits, it is recommended that the patient have 2-3 sessions per week of Physical Therapy at d/c to increase the patient's independence. At this time, this patient demonstrates the endurance and safety to discharge home with 24hr A (home vs OP services) and a follow up treatment frequency of 2-3x/wk. Please see assessment section for further patient specific details. PT Equipment Recommendations  Equipment Needed: Yes  Mobility Devices: [place]: Rolling    Assessment   Body structures, Functions, Activity limitations: Decreased functional mobility   Assessment: 78 y/o pt POD 0 s/p R BERNARDA. Pt currently moving well requiring SBA for bed mobility and CGA for transfers, amb with RW, and stair negotiation with HR. Pt planning to d/c home with 24hr A. Pt verb no safety concerns about d/c home. Pt with no further acute PT needs at this time. Will sign off from PT services. Prognosis: Excellent  Decision Making: Low Complexity  Patient Education: role of PT, use of call light, d/c planning; pt verb understanding  Barriers to Learning: none  REQUIRES PT FOLLOW UP: No  Activity Tolerance  Activity Tolerance: Patient Tolerated treatment well       Patient Diagnosis(es): The encounter diagnosis was Arthritis of right hip.     has a past medical history of Arthritis, Dental crowns present, Diabetes mellitus ([address] Hyperlipidemia, Hypertension, THERESA on CPAP, Pericarditis, and Thyroid disease.    has a past surgical history that includes ayad and bso (cervix removed) (2001); Total shoulder arthroplasty (Bilateral, 2013/2018); joint replacement (Left, 2014); joint replacement (Bilateral, 2016); Hysterectomy; eye surgery (Bilateral); Colonoscopy; and Endoscopy, colon, diagnostic. Restrictions  Position Activity Restriction  Other position/activity restrictions: Progressive mobility with assist, FWBAT, ant hip precautions + no SLR  Vision/Hearing  Vision: Impaired  Vision Exceptions: Wears glasses at all times(macular degeneration)  Hearing: Exceptions to [facility]  Hearing Exceptions: Hard of hearing/hearing concerns     Subjective  General  Chart Reviewed: Yes  Patient assessed for rehabilitation services?: Yes  Additional Pertinent Hx: 78 y/o pt s/p L ant hip BERNARDA. Family / Caregiver Present: No  Referring Practitioner: Trung Velez MD  Diagnosis: L ant hip  Follows Commands: Within Functional Limits  Subjective  Subjective: Pt found supine in bed upon arrival, denying pain and agreeable to therapy.   Pain Screening  Patient Currently in Pain: No          Orientation  Orientation  Overall Orientation Status: Within Normal Limits  Social/Functional History  Social/Functional History  Lives With: Spouse  Type of Home: House  Home Layout: Two level  Home Access: Stairs to enter with rails  Entrance Stairs - Number of Steps: 3  Entrance Stairs - Rails: Both  Bathroom Equipment: Grab bars in shower, Hand-held shower  Home Equipment: Rolling walker, Reacher, Sock aid, Cane(raised toilet seat)  ADL Assistance: Independent(some assist with socks)  Homemaking Assistance: Independent  Homemaking Responsibilities: Yes  Ambulation Assistance: Independent(cane)  Transfer Assistance: Independent  Active : Yes  Occupation: Retired  Leisure & Hobbies: reading  Additional Comments: no falls to report    Objective    AROM RLE (degrees)  RLE AROM: WFL  AROM LLE (degrees)  LLE AROM : WFL  Strength RLE  Strength RLE: WFL  Strength LLE  Strength LLE: [facility] Bed mobility  Supine to Sit: Stand by assistance  Sit to Supine: Stand by assistance  Scooting: Stand by assistance  Transfers  Sit to Stand: Contact guard assistance  Stand to sit: Contact guard assistance  Ambulation  Ambulation?: Yes  WB Status: FWBAT  Ambulation 1  Surface: level tile  Device: Rolling Walker  Assistance: Contact guard assistance  Quality of Gait: moderate alison, stride length and Chanelle. Overall steady with no LOB or near LOB.   Distance: 20'+50'+20'  Stairs/Curb  Stairs?: Yes  Stairs  # Steps : 3  Stairs Height: 6\"  Rails: Bilateral  Device: No Device  Assistance: Contact guard assistance  Comment: safe and steady with no LOB or near LOB     Balance  Posture: Good  Sitting - Static: Good  Sitting - Dynamic: Good  Standing - Static: Good  Standing - Dynamic: Good(with RW)        Plan   Plan  Times per week: d/c acute PT  Safety Devices  Type of devices: Gait belt, Left in bed, Nurse notified      Therapy Time   Individual Concurrent Group Co-treatment   Time In 1308         Time Out 1348         Minutes 40           Timed Code Treatment Minutes:  25    Total Treatment Minutes:  40     Brandon Henson, PT, DPT

## 2021-03-30 NOTE — PROGRESS NOTES
Patient been out for about an hour  Call out to update   Tolerating ice chips   Advanced to apple juice and crackers  Complaint of pain 8/10 gave prn IV Dilaudid 0.5 mg  Remind Patient to deep breath, left on nasal cannula at 2 liters  Has CPAP with her haven't placed on her  X--ray has been here for film  Awaiting PT OT to see and evaluate  Personal items at bedside  Continue to monitor

## 2021-03-30 NOTE — ANESTHESIA PRE PROCEDURE
Department of Anesthesiology  Preprocedure Note       Name:  Lizett Kasper   Age:  79 y.o.  :  1950                                          MRN:  [id number]         Date:  3/29/2021      Surgeon: Anusha Del Cid):  Johnathan Gutierrez MD    Procedure: Procedure(s):  DIRECT ANTERIOR APPROACH RIGHT TOTAL HIP ARTHROPLASTY    Medications prior to admission:   Prior to Admission medications    Medication Sig Start Date End Date Taking?  Authorizing Provider   metFORMIN (GLUCOPHAGE-XR) 500 MG extended release tablet TAKE 1 TABLET TWICE A DAY  Patient taking differently: Take 500 mg by mouth TAKE TWO (2) TWICE DAILY 21  Yes Nehemias Kendrick MD   buPROPion (WELLBUTRIN XL) 150 MG extended release tablet TAKE 1 TABLET EVERY MORNING 20  Yes Nehemias Kendrick MD   omeprazole (PRILOSEC) 40 MG delayed release capsule Take 1 capsule by mouth daily 20  Yes Nehemias Kendrick MD   atorvastatin (LIPITOR) 40 MG tablet TAKE 1 TABLET EVERY DAY  Patient taking differently: nightly TAKE 1 TABLET EVERY DAY 20  Yes Nehemias Kendrick MD   metoprolol succinate (TOPROL XL) 100 MG extended release tablet TAKE 1 TABLET EVERY DAY 20  Yes Nehemias Kendrick MD   dilTIAZem (CARDIZEM CD) 120 MG extended release capsule Take 1 capsule by mouth daily 20  Yes Nehemias Kendrick MD   levothyroxine (SYNTHROID) 25 MCG tablet Take 1 tablet by mouth Daily 20  Yes Nehemias Kendrick MD   olmesartan-hydroCHLOROthiazide (BENICAR HCT) 40-12.5 MG per tablet Take 1 tablet by mouth daily 20  Yes Nehemias Kendrick MD   Multiple Vitamins-Minerals (PRESERVISION AREDS 2 PO) Take by mouth 2 times daily    Historical Provider, MD   Multiple Vitamins-Minerals (ICAPS AREDS 2 PO) Take by mouth daily    Historical Provider, MD   metroNIDAZOLE (METROCREAM) 0.75 % cream Daily 20   Historical Provider, MD   meloxicam (MOBIC) 15 MG tablet Take 1 tablet by mouth daily 20   Nehemias Kendrick MD   Omega-3 Fatty Acids (FISH OIL) 1000 MG CAPS Take 2 capsules by mouth 2 times daily 12/1/20   Bridgette Desai MD   aspirin 81 MG tablet Take 81 mg by mouth daily    Historical Provider, MD       Current medications:    No current facility-administered medications for this encounter. Current Outpatient Medications   Medication Sig Dispense Refill    metFORMIN (GLUCOPHAGE-XR) 500 MG extended release tablet TAKE 1 TABLET TWICE A DAY (Patient taking differently: Take 500 mg by mouth TAKE TWO (2) TWICE DAILY) 180 tablet 3    buPROPion (WELLBUTRIN XL) 150 MG extended release tablet TAKE 1 TABLET EVERY MORNING 90 tablet 3    omeprazole (PRILOSEC) 40 MG delayed release capsule Take 1 capsule by mouth daily 90 capsule 3    atorvastatin (LIPITOR) 40 MG tablet TAKE 1 TABLET EVERY DAY (Patient taking differently: nightly TAKE 1 TABLET EVERY DAY) 90 tablet 3    metoprolol succinate (TOPROL XL) 100 MG extended release tablet TAKE 1 TABLET EVERY DAY 90 tablet 3    dilTIAZem (CARDIZEM CD) 120 MG extended release capsule Take 1 capsule by mouth daily 90 capsule 3    levothyroxine (SYNTHROID) 25 MCG tablet Take 1 tablet by mouth Daily 90 tablet 3    olmesartan-hydroCHLOROthiazide (BENICAR HCT) 40-12.5 MG per tablet Take 1 tablet by mouth daily 90 tablet 3    Multiple Vitamins-Minerals (PRESERVISION AREDS 2 PO) Take by mouth 2 times daily      Multiple Vitamins-Minerals (ICAPS AREDS 2 PO) Take by mouth daily      metroNIDAZOLE (METROCREAM) 0.75 % cream Daily      meloxicam (MOBIC) 15 MG tablet Take 1 tablet by mouth daily 90 tablet 3    Omega-3 Fatty Acids (FISH OIL) 1000 MG CAPS Take 2 capsules by mouth 2 times daily 90 capsule 3    aspirin 81 MG tablet Take 81 mg by mouth daily         Allergies:     Allergies   Allergen Reactions    Gluten Meal Other (See Comments)     Wheat-Causes phlegm     Other      Dogs-pt unknown reaction     Eggs Or Egg-Derived Products      Gets egg free immunizations   Patient able to eat eggs       Problem List:    Patient Active Problem List   Diagnosis Code    Personal history of colonic polyps Z86.010    Diverticulosis of large intestine without hemorrhage K57.30    Other specified hypothyroidism E03.8    Pure hypercholesterolemia E78.00    Recurrent major depressive disorder, in partial remission ([facility] [address] F33.41    Type 2 diabetes mellitus without complication, without long-term current use of insulin ([facility]) E11.9    Primary osteoarthritis involving multiple joints M89.49    Arthritis of right hip M16.11    Intractable left heel pain M79.672    Class 2 severe obesity due to excess calories with serious comorbidity and body mass index (BMI) of 38.0 to 38.9 in adult ([facility]) E66.01, Z68.38    Allergic rhinitis J30.9    Obstructive sleep apnea syndrome G47.33    Neck strain S16. 1XXA    Macular degeneration of both eyes H35.30    Hearing loss H91.90       Past Medical History:        Diagnosis Date    Arthritis     shoulders hips knees    Diabetes mellitus ([facility] [address]     Hyperlipidemia     Hypertension     THERESA on CPAP     Pericarditis 9/25/2018    Onset 2008 w 2 hospitalizations Treated w medrol prn for flares     Thyroid disease        Past Surgical History:        Procedure Laterality Date    COLONOSCOPY      ENDOSCOPY, COLON, DIAGNOSTIC      EYE SURGERY Bilateral     cataracts    HYSTERECTOMY      JOINT REPLACEMENT Left 2014    JOINT REPLACEMENT Bilateral 2016    knees    SHOULDER ARTHROPLASTY Bilateral 2013/2018    GONSALO AND BSO  2001       Social History:    Social History     Tobacco Use    Smoking status: Never Smoker    Smokeless tobacco: Never Used   Substance Use Topics    Alcohol use:  Yes     Alcohol/week: 2.0 standard drinks     Types: 1 Glasses of wine, 1 Shots of liquor per week     Comment: 2-3 times a week                                 Counseling given: Not Answered      Vital Signs (Current):   Vitals:    03/23/21 1103   Weight: 268 lb (121.6 kg)   Height: 5' 9\" (1.753 m)                                              BP Readings from Last 3 Encounters:   03/23/21 138/88   12/01/20 128/80   05/19/20 138/81       NPO Status:                                                                                 BMI:   Wt Readings from Last 3 Encounters:   03/23/21 268 lb (121.6 kg)   12/01/20 275 lb (124.7 kg)   05/19/20 261 lb (118.4 kg)     Body mass index is 39.58 kg/m². CBC:   Lab Results   Component Value Date    WBC 6.4 11/19/2020    RBC 4.37 11/19/2020    HGB 13.0 11/19/2020    HCT 40.0 11/19/2020    MCV 91.6 11/19/2020    RDW 14.2 11/19/2020     11/19/2020       CMP:   Lab Results   Component Value Date     03/23/2021    K 4.2 03/23/2021     03/23/2021    CO2 25 03/23/2021    BUN 14 03/23/2021    CREATININE <0.5 03/23/2021    GFRAA >60 03/23/2021    AGRATIO 1.4 11/19/2020    LABGLOM >60 03/23/2021    GLUCOSE 105 03/23/2021    PROT 6.9 11/19/2020    CALCIUM 9.8 03/23/2021    BILITOT 0.5 11/19/2020    ALKPHOS 103 11/19/2020    AST 11 11/19/2020    ALT 16 11/19/2020       POC Tests: No results for input(s): POCGLU, POCNA, POCK, POCCL, POCBUN, POCHEMO, POCHCT in the last 72 hours.     Coags:   Lab Results   Component Value Date    PROTIME 12.8 03/23/2021    INR 1.10 03/23/2021    APTT 32.4 03/23/2021       HCG (If Applicable): No results found for: PREGTESTUR, PREGSERUM, HCG, HCGQUANT     ABGs: No results found for: PHART, PO2ART, SKH7DTU, FTI5GMC, BEART, O0GZIOBZ     Type & Screen (If Applicable):  No results found for: LABABO, LABRH    Drug/Infectious Status (If Applicable):  No results found for: HIV, HEPCAB    COVID-19 Screening (If Applicable):   Lab Results   Component Value Date    COVID19 Not Detected 03/25/2021           Anesthesia Evaluation  Patient summary reviewed and Nursing notes reviewed no history of anesthetic complications:   Airway: Mallampati: II  TM distance: >3 FB   Neck ROM: full  Mouth opening: > = 3 FB Dental:          Pulmonary:   (+) sleep apnea: on CPAP,                             Cardiovascular: (+) hypertension:,                   Neuro/Psych:   (+) psychiatric history:            GI/Hepatic/Renal: Neg GI/Hepatic/Renal ROS            Endo/Other:    (+) DiabetesType II DM, , hypothyroidism::., .                 Abdominal:           Vascular:                                        Anesthesia Plan      epidural     ASA 1    (69-year-old female presents for right total hip arthroplasty. Plan epidural anesthesia with I.V. sedation as needed. ASA standard monitors. Patient was given the opportunity to ask question and is in agreement with the anesthetic plan.)  Induction: intravenous. Anesthetic plan and risks discussed with patient. Plan discussed with CRNA.     Attending anesthesiologist reviewed and agrees with Pre Eval content        Virginia Mcgee MD   3/29/2021

## 2021-03-30 NOTE — PROGRESS NOTES
Anesthesia here to do block. O2 placed. Start ZVGD:8820  Stop DEDF:9724  Tolerated epidural by Dr Silvana Denise with sedation without problem    See flow sheet for vital signs.

## 2021-03-30 NOTE — PROGRESS NOTES
Current Allergies: Gluten meal, Other, and Eggs or egg-derived products    Recent Labs     03/30/21  0717 03/30/21  1055   POCGLU 108* 140*       Admitted to PACU bed 4 from OR. Arrived on a stretcher . Attached to PACU monitoring system. Alarms and parameters set. Report received from anesthesia personnel [id number] staff did not report skin issues that were observed while in OR  No problems reported intraoperatively. Pt arrived with oxygen per nasal cannula with oxygen at 4 liters. Athrombic wraps in place.      Doctors aware of all labs before coming to recovery    Patient has wound vac to go home with

## 2021-03-30 NOTE — PROGRESS NOTES
Ambulatory Surgery/Procedure Discharge Note    Vitals:    03/30/21 1300   BP: 138/65   Pulse: 59   Resp: 12   Temp:    SpO2: 98%       In: 800 [I.V.:800]  Out: 300     Restroom use offered before discharge. yes    Pain assessment:    Pain Level: 5    Patient stated pain was tolerable for discharge. Patient discharged to home/self care.  Patient discharged via wheel chair by transporter to waiting family/S.O.       3/30/2021 2:52 PM

## 2021-04-23 NOTE — DISCHARGE SUMMARY
[address] [facility] SUMMARY    Pre Operative Diagnosis  Primary osteoarthritis of right hip [M16.11]    Post Operative Diagnosis  Primary osteoarthritis of right hip [M16.11]    Discharge Diagnosis Primary osteoarthritis of right hip [M16.11]    Procedure Preformed  Procedure(s):  DIRECT ANTERIOR APPROACH RIGHT TOTAL HIP ARTHROPLASTY    Surgeon  Krystle Castillo MD     Medical course: as per medical records       The patient was taken to the operating room  where the aforementioned procedure was preformed. The patient was taken to the post operative anesthesia recovery unit in stable condition. The patient was then transferred to the orthopaedic floor for post operative pain management and convalesce. ( x )The patient was placed on anticoagulation therapy for DVT prophylaxis       The patient was discharged in stable condition. Please see medical reconciliation for discharge medications. The discharge instructions were explained to the patient and the family. The patient will follow up in the office in 3 weeks for repeat examination and xray .

## 2021-05-27 ENCOUNTER — TELEPHONE (OUTPATIENT)
Dept: INTERNAL MEDICINE CLINIC | Age: 71
End: 2021-05-27

## 2021-05-27 DIAGNOSIS — E78.00 PURE HYPERCHOLESTEROLEMIA: ICD-10-CM

## 2021-05-27 DIAGNOSIS — E03.8 OTHER SPECIFIED HYPOTHYROIDISM: ICD-10-CM

## 2021-05-27 DIAGNOSIS — E11.9 TYPE 2 DIABETES MELLITUS WITHOUT COMPLICATION, WITHOUT LONG-TERM CURRENT USE OF INSULIN (HCC): Primary | ICD-10-CM

## 2021-05-27 DIAGNOSIS — Z12.31 SCREENING MAMMOGRAM, ENCOUNTER FOR: ICD-10-CM

## 2021-05-27 NOTE — TELEPHONE ENCOUNTER
Pt would like lab orders placed in her chart so that she can have these done before her appointment on 8/2/2021    Also     Pt states that she typically has a Mammogram yearly, however my chart has her frequency every 2 years. She wants to be sure that she should continue getting every year, unless you see otherwise.  She would need a order placed

## 2021-05-28 NOTE — TELEPHONE ENCOUNTER
Tell her I recommend a yearly mammogram- not sure if her insurance pays but it is very necessary in my opinion and order glyco, cmp,tsh,urine for microalbumen, she had lipid and cbc in march and no need to tammi those

## 2021-06-09 ENCOUNTER — TELEPHONE (OUTPATIENT)
Dept: INTERNAL MEDICINE CLINIC | Age: 71
End: 2021-06-09

## 2021-06-09 DIAGNOSIS — M16.11 ARTHRITIS OF RIGHT HIP: ICD-10-CM

## 2021-06-09 RX ORDER — OLMESARTAN MEDOXOMIL 40 MG/1
40 TABLET ORAL DAILY
Qty: 90 TABLET | Refills: 1 | Status: SHIPPED | OUTPATIENT
Start: 2021-06-09 | End: 2021-06-11 | Stop reason: DRUGHIGH

## 2021-06-09 RX ORDER — HYDROCHLOROTHIAZIDE 12.5 MG/1
12.5 CAPSULE, GELATIN COATED ORAL EVERY MORNING
Qty: 90 CAPSULE | Refills: 1 | Status: SHIPPED
Start: 2021-06-09 | End: 2021-06-11 | Stop reason: DRUGHIGH

## 2021-06-09 NOTE — TELEPHONE ENCOUNTER
Pharmacy is requesting the following scripts to be filled      olmesartan (BENICAR) 40 MG tablet      hydroCHLOROthiazide (MICROZIDE) 12.5 MG capsule          Pharmacy Info      [address], [facility] [phone] - V [phone]  Provider Line: [phone]  NATACHA#[id number]

## 2021-06-10 ENCOUNTER — TELEPHONE (OUTPATIENT)
Dept: INTERNAL MEDICINE CLINIC | Age: 71
End: 2021-06-10

## 2021-06-10 NOTE — TELEPHONE ENCOUNTER
Pt called stating that she is now taking Benicar/HCT 40/12.5 mg instead of 2 separate pills. Pt states [facility],  months ago was out of the combination medication so script was split in 2 pills. Since then she has been taking the combo medication not sure why [facility] Requested separate scripts. .... I explained to patient she should contact both pharmacies to update script to what she is currently taking so pharmacies will be up to date. Pt understood and will call back as to which form of medication she will be taking. Pt also wants to check the cost of medication to see which form would be cheaper for her. Pt at this time does not want chart up to date. Note will be closed at this time.

## 2021-06-11 ENCOUNTER — TELEPHONE (OUTPATIENT)
Dept: INTERNAL MEDICINE CLINIC | Age: 71
End: 2021-06-11

## 2021-06-11 RX ORDER — OLMESARTAN MEDOXOMIL AND HYDROCHLOROTHIAZIDE 40/12.5 40; 12.5 MG/1; MG/1
1 TABLET ORAL DAILY
Qty: 90 TABLET | Refills: 1 | COMMUNITY
Start: 2021-06-11 | End: 2021-11-04 | Stop reason: SDUPTHER

## 2021-06-11 NOTE — TELEPHONE ENCOUNTER
Patient requested to speak to [facility]. Its about her medications and she wanted her to these medication off:      hydroCHLOROthiazide (MICROZIDE) 12.5 MG capsule    olmesartan (BENICAR) 40 MG tablet . Patient states there's pill with both medications combined. Please call to advise.

## 2021-06-30 ENCOUNTER — HOSPITAL ENCOUNTER (OUTPATIENT)
Dept: MAMMOGRAPHY | Age: 71
Discharge: HOME | End: 2021-06-30
Payer: MEDICARE

## 2021-06-30 VITALS — HEIGHT: 69 IN | WEIGHT: 275 LBS | BODY MASS INDEX: 40.73 KG/M2

## 2021-06-30 DIAGNOSIS — Z12.31 SCREENING MAMMOGRAM, ENCOUNTER FOR: ICD-10-CM

## 2021-06-30 PROCEDURE — 77063 BREAST TOMOSYNTHESIS BI: CPT

## 2021-07-27 DIAGNOSIS — E78.00 PURE HYPERCHOLESTEROLEMIA: ICD-10-CM

## 2021-07-27 DIAGNOSIS — E11.9 TYPE 2 DIABETES MELLITUS WITHOUT COMPLICATION, WITHOUT LONG-TERM CURRENT USE OF INSULIN (HCC): ICD-10-CM

## 2021-07-27 DIAGNOSIS — E03.8 OTHER SPECIFIED HYPOTHYROIDISM: ICD-10-CM

## 2021-07-27 LAB
A/G RATIO: 1.6 (ref 1.1–2.2)
ALBUMIN SERPL-MCNC: 4.2 G/DL (ref 3.4–5)
ALP BLD-CCNC: 106 U/L (ref 40–129)
ALT SERPL-CCNC: 10 U/L (ref 10–40)
ANION GAP SERPL CALCULATED.3IONS-SCNC: 13 MMOL/L (ref 3–16)
AST SERPL-CCNC: 9 U/L (ref 15–37)
BILIRUB SERPL-MCNC: 0.3 MG/DL (ref 0–1)
BUN BLDV-MCNC: 14 MG/DL (ref 7–20)
CALCIUM SERPL-MCNC: 9.5 MG/DL (ref 8.3–10.6)
CHLORIDE BLD-SCNC: 103 MMOL/L (ref 99–110)
CO2: 26 MMOL/L (ref 21–32)
CREAT SERPL-MCNC: 0.6 MG/DL (ref 0.6–1.2)
CREATININE URINE: 45.3 MG/DL (ref 28–259)
GFR AFRICAN AMERICAN: >60
GFR NON-AFRICAN AMERICAN: >60
GLOBULIN: 2.6 G/DL
GLUCOSE BLD-MCNC: 125 MG/DL (ref 70–99)
MICROALBUMIN UR-MCNC: <1.2 MG/DL
MICROALBUMIN/CREAT UR-RTO: NORMAL MG/G (ref 0–30)
POTASSIUM SERPL-SCNC: 4.2 MMOL/L (ref 3.5–5.1)
SODIUM BLD-SCNC: 142 MMOL/L (ref 136–145)
TOTAL PROTEIN: 6.8 G/DL (ref 6.4–8.2)
TSH REFLEX: 2.28 UIU/ML (ref 0.27–4.2)

## 2021-07-28 LAB
ESTIMATED AVERAGE GLUCOSE: 151.3 MG/DL
HBA1C MFR BLD: 6.9 %

## 2021-08-02 ENCOUNTER — VIRTUAL VISIT (OUTPATIENT)
Dept: INTERNAL MEDICINE CLINIC | Age: 71
End: 2021-08-02
Payer: MEDICARE

## 2021-08-02 DIAGNOSIS — G47.33 OBSTRUCTIVE SLEEP APNEA SYNDROME: ICD-10-CM

## 2021-08-02 DIAGNOSIS — M16.11 ARTHRITIS OF RIGHT HIP: ICD-10-CM

## 2021-08-02 DIAGNOSIS — F33.41 RECURRENT MAJOR DEPRESSIVE DISORDER, IN PARTIAL REMISSION (HCC): ICD-10-CM

## 2021-08-02 DIAGNOSIS — E11.9 TYPE 2 DIABETES MELLITUS WITHOUT COMPLICATION, WITHOUT LONG-TERM CURRENT USE OF INSULIN (HCC): ICD-10-CM

## 2021-08-02 DIAGNOSIS — E03.8 OTHER SPECIFIED HYPOTHYROIDISM: ICD-10-CM

## 2021-08-02 DIAGNOSIS — E66.01 CLASS 2 SEVERE OBESITY DUE TO EXCESS CALORIES WITH SERIOUS COMORBIDITY AND BODY MASS INDEX (BMI) OF 38.0 TO 38.9 IN ADULT (HCC): ICD-10-CM

## 2021-08-02 DIAGNOSIS — E78.00 PURE HYPERCHOLESTEROLEMIA: ICD-10-CM

## 2021-08-02 PROCEDURE — 4040F PNEUMOC VAC/ADMIN/RCVD: CPT | Performed by: INTERNAL MEDICINE

## 2021-08-02 PROCEDURE — 2022F DILAT RTA XM EVC RTNOPTHY: CPT | Performed by: INTERNAL MEDICINE

## 2021-08-02 PROCEDURE — 3017F COLORECTAL CA SCREEN DOC REV: CPT | Performed by: INTERNAL MEDICINE

## 2021-08-02 PROCEDURE — G8427 DOCREV CUR MEDS BY ELIG CLIN: HCPCS | Performed by: INTERNAL MEDICINE

## 2021-08-02 PROCEDURE — 1123F ACP DISCUSS/DSCN MKR DOCD: CPT | Performed by: INTERNAL MEDICINE

## 2021-08-02 PROCEDURE — G8399 PT W/DXA RESULTS DOCUMENT: HCPCS | Performed by: INTERNAL MEDICINE

## 2021-08-02 PROCEDURE — 3044F HG A1C LEVEL LT 7.0%: CPT | Performed by: INTERNAL MEDICINE

## 2021-08-02 PROCEDURE — 99214 OFFICE O/P EST MOD 30 MIN: CPT | Performed by: INTERNAL MEDICINE

## 2021-08-02 PROCEDURE — 1090F PRES/ABSN URINE INCON ASSESS: CPT | Performed by: INTERNAL MEDICINE

## 2021-08-02 RX ORDER — MULTIVIT WITH MINERALS/LUTEIN
1000 TABLET ORAL DAILY
COMMUNITY

## 2021-08-02 RX ORDER — ACETAMINOPHEN 160 MG
TABLET,DISINTEGRATING ORAL
COMMUNITY

## 2021-08-02 RX ORDER — METFORMIN HYDROCHLORIDE 500 MG/1
1000 TABLET, EXTENDED RELEASE ORAL 2 TIMES DAILY WITH MEALS
Qty: 360 TABLET | Refills: 3 | Status: SHIPPED | OUTPATIENT
Start: 2021-08-02 | End: 2022-01-07 | Stop reason: SDUPTHER

## 2021-08-02 SDOH — HEALTH STABILITY: PHYSICAL HEALTH: ON AVERAGE, HOW MANY MINUTES DO YOU ENGAGE IN EXERCISE AT THIS LEVEL?: 40 MIN

## 2021-08-02 SDOH — ECONOMIC STABILITY: FOOD INSECURITY: WITHIN THE PAST 12 MONTHS, THE FOOD YOU BOUGHT JUST DIDN'T LAST AND YOU DIDN'T HAVE MONEY TO GET MORE.: NEVER TRUE

## 2021-08-02 SDOH — ECONOMIC STABILITY: FOOD INSECURITY: WITHIN THE PAST 12 MONTHS, YOU WORRIED THAT YOUR FOOD WOULD RUN OUT BEFORE YOU GOT MONEY TO BUY MORE.: NEVER TRUE

## 2021-08-02 SDOH — ECONOMIC STABILITY: INCOME INSECURITY: IN THE LAST 12 MONTHS, WAS THERE A TIME WHEN YOU WERE NOT ABLE TO PAY THE MORTGAGE OR RENT ON TIME?: NO

## 2021-08-02 SDOH — ECONOMIC STABILITY: HOUSING INSECURITY
IN THE LAST 12 MONTHS, WAS THERE A TIME WHEN YOU DID NOT HAVE A STEADY PLACE TO SLEEP OR SLEPT IN A SHELTER (INCLUDING NOW)?: NO

## 2021-08-02 SDOH — HEALTH STABILITY: PHYSICAL HEALTH: ON AVERAGE, HOW MANY DAYS PER WEEK DO YOU ENGAGE IN MODERATE TO STRENUOUS EXERCISE (LIKE A BRISK WALK)?: 2 DAYS

## 2021-08-02 ASSESSMENT — SOCIAL DETERMINANTS OF HEALTH (SDOH)
WITHIN THE LAST YEAR, HAVE YOU BEEN KICKED, HIT, SLAPPED, OR OTHERWISE PHYSICALLY HURT BY YOUR PARTNER OR EX-PARTNER?: NO
WITHIN THE LAST YEAR, HAVE TO BEEN RAPED OR FORCED TO HAVE ANY KIND OF SEXUAL ACTIVITY BY YOUR PARTNER OR EX-PARTNER?: NO
WITHIN THE LAST YEAR, HAVE YOU BEEN HUMILIATED OR EMOTIONALLY ABUSED IN OTHER WAYS BY YOUR PARTNER OR EX-PARTNER?: NO
IN A TYPICAL WEEK, HOW MANY TIMES DO YOU TALK ON THE PHONE WITH FAMILY, FRIENDS, OR NEIGHBORS?: MORE THAN THREE TIMES A WEEK
WITHIN THE LAST YEAR, HAVE YOU BEEN AFRAID OF YOUR PARTNER OR EX-PARTNER?: NO
HOW OFTEN DO YOU GET TOGETHER WITH FRIENDS OR RELATIVES?: TWICE A WEEK
HOW HARD IS IT FOR YOU TO PAY FOR THE VERY BASICS LIKE FOOD, HOUSING, MEDICAL CARE, AND HEATING?: NOT HARD AT ALL
HOW OFTEN DO YOU ATTENT MEETINGS OF THE CLUB OR ORGANIZATION YOU BELONG TO?: NEVER
HOW OFTEN DO YOU ATTEND CHURCH OR RELIGIOUS SERVICES?: NEVER

## 2021-08-02 ASSESSMENT — LIFESTYLE VARIABLES
HOW MANY STANDARD DRINKS CONTAINING ALCOHOL DO YOU HAVE ON A TYPICAL DAY: 1 OR 2
HOW OFTEN DO YOU HAVE A DRINK CONTAINING ALCOHOL: 2-3 TIMES A WEEK

## 2021-08-02 ASSESSMENT — ENCOUNTER SYMPTOMS
ABDOMINAL PAIN: 0
CHEST TIGHTNESS: 0
WHEEZING: 0
BACK PAIN: 0
COLOR CHANGE: 0

## 2021-08-02 NOTE — PROGRESS NOTES
Mak Gonzalez 2 yrs ago-otherwise feels well - bp's well controlled-Pt taking thyroid meds reliably every am fasting  Hip pain is resolved after surgery-trying to exercise more - depression is worse recently and is looking into counseling       Review of Systems   Constitutional: Negative for activity change, appetite change and fatigue. HENT: Negative for congestion. Eyes: Negative for visual disturbance. Respiratory: Negative for chest tightness and wheezing. Cardiovascular: Negative for chest pain and palpitations. Gastrointestinal: Negative for abdominal pain. Musculoskeletal: Negative for arthralgias and back pain. Skin: Negative for color change and rash. Neurological: Negative for weakness, light-headedness and headaches. Psychiatric/Behavioral: Positive for dysphoric mood. Negative for sleep disturbance. The patient is not nervous/anxious.         Patient-Reported Vitals 8/2/2021   Patient-Reported Weight 268lb   Patient-Reported Height 5 9   Patient-Reported Systolic 696   Patient-Reported Diastolic 78   Patient-Reported Pulse 60        Physical Exam    [INSTRUCTIONS:  \"[x]\" Indicates a positive item  \"[]\" Indicates a negative item  -- DELETE ALL ITEMS NOT EXAMINED]    Constitutional: [x] Appears well-developed and well-nourished [x] No apparent distress      [] Abnormal -     Mental status: [x] Alert and awake  [x] Oriented to person/place/time [x] Able to follow commands    [] Abnormal -     Eyes:   EOM    [x]  Normal    [] Abnormal -   Sclera  [x]  Normal    [] Abnormal -          Discharge [x]  None visible   [] Abnormal -     HENT: [x] Normocephalic, atraumatic  [] Abnormal -   [x] Mouth/Throat: Mucous membranes are moist    External Ears [x] Normal  [] Abnormal -    Neck: [x] No visualized mass [] Abnormal -     Pulmonary/Chest: [x] Respiratory effort normal   [x] No visualized signs of difficulty breathing or respiratory distress        [] Abnormal -      Musculoskeletal:   [x] Normal gait with no signs of ataxia         [x] Normal range of motion of neck        [] Abnormal -     Neurological:        [x] No Facial Asymmetry (Cranial nerve 7 motor function) (limited exam due to video visit)          [x] No gaze palsy        [] Abnormal -          Skin:        [x] No significant exanthematous lesions or discoloration noted on facial skin         [] Abnormal -            Psychiatric:       [x] Normal Affect [] Abnormal -        [x] No Hallucinations    Other pertinent observable physical exam findings:-            Bethany Huggins, was evaluated through a synchronous (real-time) audio-video encounter. The patient (or guardian if applicable) is aware that this is a billable service. Verbal consent to proceed has been obtained within the past 12 months. The visit was conducted pursuant to the emergency declaration under the [address] [place] [address] authority and the [employer] General Act. Patient identification was verified, and a caregiver was present when appropriate. The patient was located in a state where the provider was credentialed to provide care. An electronic signature was used to authenticate this note.     --Vargas Noland MD

## 2021-11-04 RX ORDER — OLMESARTAN MEDOXOMIL AND HYDROCHLOROTHIAZIDE 40/12.5 40; 12.5 MG/1; MG/1
TABLET ORAL
Qty: 90 TABLET | Refills: 3 | Status: SHIPPED | OUTPATIENT
Start: 2021-11-04 | End: 2022-01-07 | Stop reason: SDUPTHER

## 2021-12-09 DIAGNOSIS — E11.9 TYPE 2 DIABETES MELLITUS WITHOUT COMPLICATION, WITHOUT LONG-TERM CURRENT USE OF INSULIN (HCC): ICD-10-CM

## 2021-12-09 DIAGNOSIS — F33.41 RECURRENT MAJOR DEPRESSIVE DISORDER, IN PARTIAL REMISSION (HCC): ICD-10-CM

## 2021-12-09 DIAGNOSIS — M16.11 ARTHRITIS OF RIGHT HIP: ICD-10-CM

## 2021-12-09 DIAGNOSIS — E66.01 CLASS 2 SEVERE OBESITY DUE TO EXCESS CALORIES WITH SERIOUS COMORBIDITY AND BODY MASS INDEX (BMI) OF 38.0 TO 38.9 IN ADULT (HCC): ICD-10-CM

## 2021-12-09 DIAGNOSIS — E03.8 OTHER SPECIFIED HYPOTHYROIDISM: ICD-10-CM

## 2021-12-09 DIAGNOSIS — E78.00 PURE HYPERCHOLESTEROLEMIA: ICD-10-CM

## 2021-12-09 DIAGNOSIS — G47.33 OBSTRUCTIVE SLEEP APNEA SYNDROME: ICD-10-CM

## 2021-12-09 LAB
BASOPHILS ABSOLUTE: 0 K/UL (ref 0–0.2)
BASOPHILS RELATIVE PERCENT: 0.2 %
CHOLESTEROL, TOTAL: 161 MG/DL (ref 0–199)
EOSINOPHILS ABSOLUTE: 0.3 K/UL (ref 0–0.6)
EOSINOPHILS RELATIVE PERCENT: 4.5 %
HCT VFR BLD CALC: 39.3 % (ref 36–48)
HDLC SERPL-MCNC: 43 MG/DL (ref 40–60)
HEMOGLOBIN: 12.4 G/DL (ref 12–16)
LDL CHOLESTEROL CALCULATED: 79 MG/DL
LYMPHOCYTES ABSOLUTE: 1.2 K/UL (ref 1–5.1)
LYMPHOCYTES RELATIVE PERCENT: 19.1 %
MCH RBC QN AUTO: 28.4 PG (ref 26–34)
MCHC RBC AUTO-ENTMCNC: 31.6 G/DL (ref 31–36)
MCV RBC AUTO: 89.8 FL (ref 80–100)
MONOCYTES ABSOLUTE: 0.5 K/UL (ref 0–1.3)
MONOCYTES RELATIVE PERCENT: 7.7 %
NEUTROPHILS ABSOLUTE: 4.3 K/UL (ref 1.7–7.7)
NEUTROPHILS RELATIVE PERCENT: 68.5 %
PDW BLD-RTO: 15.2 % (ref 12.4–15.4)
PLATELET # BLD: 313 K/UL (ref 135–450)
PMV BLD AUTO: 7.7 FL (ref 5–10.5)
RBC # BLD: 4.38 M/UL (ref 4–5.2)
TRIGL SERPL-MCNC: 196 MG/DL (ref 0–150)
VLDLC SERPL CALC-MCNC: 39 MG/DL
WBC # BLD: 6.2 K/UL (ref 4–11)

## 2021-12-10 LAB
ESTIMATED AVERAGE GLUCOSE: 137 MG/DL
HBA1C MFR BLD: 6.4 %

## 2021-12-14 ENCOUNTER — OFFICE VISIT (OUTPATIENT)
Dept: INTERNAL MEDICINE CLINIC | Age: 71
End: 2021-12-14
Payer: MEDICARE

## 2021-12-14 VITALS
SYSTOLIC BLOOD PRESSURE: 138 MMHG | DIASTOLIC BLOOD PRESSURE: 88 MMHG | HEIGHT: 69 IN | WEIGHT: 262 LBS | TEMPERATURE: 98.1 F | BODY MASS INDEX: 38.8 KG/M2

## 2021-12-14 DIAGNOSIS — E78.2 MIXED HYPERLIPIDEMIA: ICD-10-CM

## 2021-12-14 DIAGNOSIS — G47.33 OBSTRUCTIVE SLEEP APNEA SYNDROME: ICD-10-CM

## 2021-12-14 DIAGNOSIS — E03.8 OTHER SPECIFIED HYPOTHYROIDISM: ICD-10-CM

## 2021-12-14 DIAGNOSIS — F33.41 RECURRENT MAJOR DEPRESSIVE DISORDER, IN PARTIAL REMISSION (HCC): ICD-10-CM

## 2021-12-14 DIAGNOSIS — Z00.00 ROUTINE GENERAL MEDICAL EXAMINATION AT A HEALTH CARE FACILITY: Primary | ICD-10-CM

## 2021-12-14 DIAGNOSIS — E66.01 CLASS 2 SEVERE OBESITY DUE TO EXCESS CALORIES WITH SERIOUS COMORBIDITY AND BODY MASS INDEX (BMI) OF 38.0 TO 38.9 IN ADULT (HCC): ICD-10-CM

## 2021-12-14 DIAGNOSIS — E11.9 TYPE 2 DIABETES MELLITUS WITHOUT COMPLICATION, WITHOUT LONG-TERM CURRENT USE OF INSULIN (HCC): ICD-10-CM

## 2021-12-14 PROCEDURE — 2022F DILAT RTA XM EVC RTNOPTHY: CPT | Performed by: INTERNAL MEDICINE

## 2021-12-14 PROCEDURE — G8427 DOCREV CUR MEDS BY ELIG CLIN: HCPCS | Performed by: INTERNAL MEDICINE

## 2021-12-14 PROCEDURE — G8482 FLU IMMUNIZE ORDER/ADMIN: HCPCS | Performed by: INTERNAL MEDICINE

## 2021-12-14 PROCEDURE — G8417 CALC BMI ABV UP PARAM F/U: HCPCS | Performed by: INTERNAL MEDICINE

## 2021-12-14 PROCEDURE — 4040F PNEUMOC VAC/ADMIN/RCVD: CPT | Performed by: INTERNAL MEDICINE

## 2021-12-14 PROCEDURE — 1090F PRES/ABSN URINE INCON ASSESS: CPT | Performed by: INTERNAL MEDICINE

## 2021-12-14 PROCEDURE — 3044F HG A1C LEVEL LT 7.0%: CPT | Performed by: INTERNAL MEDICINE

## 2021-12-14 PROCEDURE — 3017F COLORECTAL CA SCREEN DOC REV: CPT | Performed by: INTERNAL MEDICINE

## 2021-12-14 PROCEDURE — G0439 PPPS, SUBSEQ VISIT: HCPCS | Performed by: INTERNAL MEDICINE

## 2021-12-14 PROCEDURE — 99213 OFFICE O/P EST LOW 20 MIN: CPT | Performed by: INTERNAL MEDICINE

## 2021-12-14 PROCEDURE — G8399 PT W/DXA RESULTS DOCUMENT: HCPCS | Performed by: INTERNAL MEDICINE

## 2021-12-14 PROCEDURE — 1123F ACP DISCUSS/DSCN MKR DOCD: CPT | Performed by: INTERNAL MEDICINE

## 2021-12-14 PROCEDURE — 1036F TOBACCO NON-USER: CPT | Performed by: INTERNAL MEDICINE

## 2021-12-14 RX ORDER — CHLORAL HYDRATE 500 MG
2000 CAPSULE ORAL 2 TIMES DAILY
Qty: 90 CAPSULE | Refills: 3
Start: 2021-12-14 | End: 2022-06-22 | Stop reason: CLARIF

## 2021-12-14 ASSESSMENT — ENCOUNTER SYMPTOMS
CHEST TIGHTNESS: 0
ABDOMINAL PAIN: 0
COLOR CHANGE: 0
BACK PAIN: 0
WHEEZING: 0

## 2021-12-14 ASSESSMENT — PATIENT HEALTH QUESTIONNAIRE - PHQ9
1. LITTLE INTEREST OR PLEASURE IN DOING THINGS: 0
SUM OF ALL RESPONSES TO PHQ QUESTIONS 1-9: 0
2. FEELING DOWN, DEPRESSED OR HOPELESS: 0
SUM OF ALL RESPONSES TO PHQ9 QUESTIONS 1 & 2: 0

## 2021-12-14 NOTE — PROGRESS NOTES
Subjective:      Patient ID: Verna Dawn is a 70 y.o. female. Chief Complaint   Patient presents with    Medicare AWV     yearly,review labs     Feels well- does have macular degeneration but stable last check- still working on her diet- right hip is doing better and is able to exercise more-seeing a therapist and mood is better-continues to work on her intake of sugars-Pt taking thyroid meds reliably every am fasting-A1C is down 0.5 so has been successful getting this down-triglycerides are still sl up -bp's have not been checked at home recently and elevated here today-weight is down some-triglycerides are up and not taking fish oil regularly       Review of Systems   Constitutional: Negative for activity change, appetite change and fatigue. HENT: Negative for congestion. Eyes: Negative for visual disturbance. Respiratory: Negative for chest tightness and wheezing. Cardiovascular: Negative for chest pain and palpitations. Gastrointestinal: Negative for abdominal pain. Musculoskeletal: Negative for arthralgias and back pain. Skin: Negative for color change and rash. Neurological: Negative for weakness, light-headedness and headaches. Family History   Problem Relation Age of Onset    Ovarian Cancer Maternal Aunt     Breast Cancer Paternal Aunt      Social History     Socioeconomic History    Marital status:      Spouse name: Not on file    Number of children: Not on file    Years of education: Not on file    Highest education level: Not on file   Occupational History    Not on file   Tobacco Use    Smoking status: Never Smoker    Smokeless tobacco: Never Used   Vaping Use    Vaping Use: Never used   Substance and Sexual Activity    Alcohol use:  Yes     Alcohol/week: 2.0 standard drinks     Types: 1 Glasses of wine, 1 Shots of liquor per week     Comment: 2-3 times a week     Drug use: Not on file    Sexual activity: Not on file   Other Topics Concern    Not on file Social History Narrative    Not on file     Social Determinants of Health     Financial Resource Strain: Low Risk     Difficulty of Paying Living Expenses: Not hard at all   Food Insecurity: No Food Insecurity    Worried About Running Out of Food in the Last Year: Never true    [address] in the Last Year: Never true   Transportation Needs: No Transportation Needs    Lack of Transportation (Medical): No    Lack of Transportation (Non-Medical): No   Physical Activity: Insufficiently Active    Days of Exercise per Week: 2 days    Minutes of Exercise per Session: 40 min   Stress: No Stress Concern Present    Feeling of Stress : Not at all   Social Connections: Moderately Isolated    Frequency of Communication with Friends and Family: More than three times a week    Frequency of Social Gatherings with Friends and Family: Twice a week    Attends [employer] Services: Never    Active Member of Clubs or Organizations: Not on file    Attends Club or Organization Meetings: Never    Marital Status:    Intimate Partner Violence: Not At Risk    Fear of Current or Ex-Partner: No    Emotionally Abused: No    Physically Abused: No    Sexually Abused: No   Housing Stability: Unknown    Unable to Pay for Housing in the Last Year: No    Number of [place] in the Last Year: Not on file    Unstable Housing in the Last Year: No         Objective:   Physical Exam  Constitutional:       Appearance: She is well-developed. HENT:      Head: Normocephalic and atraumatic. Eyes:      Extraocular Movements: Extraocular movements intact. Conjunctiva/sclera: Conjunctivae normal.      Pupils: Pupils are equal, round, and reactive to light. Cardiovascular:      Rate and Rhythm: Normal rate and regular rhythm. Heart sounds: Normal heart sounds. Pulmonary:      Effort: Pulmonary effort is normal. No respiratory distress. Breath sounds: Normal breath sounds.    Abdominal:      General: There is no distension. Tenderness: There is no abdominal tenderness. Musculoskeletal:      Cervical back: Normal range of motion and neck supple. Lymphadenopathy:      Cervical: No cervical adenopathy. Skin:     General: Skin is warm and dry. Neurological:      Mental Status: She is alert and oriented to person, place, and time. Psychiatric:         Mood and Affect: Mood normal.         Behavior: Behavior normal.         Thought Content: Thought content normal.         Judgment: Judgment normal.           Assessment and Plan:      Obstructive sleep apnea syndrome   Cont cpap    Type 2 diabetes mellitus without complication, without long-term current use of insulin (HCC)   Controlled w diet now-cont to monitor     Other specified hypothyroidism   Controlled w current regimen- continue and monitor closely      Class 2 severe obesity due to excess calories with serious comorbidity and body mass index (BMI) of 38.0 to 38.9 in [facility])   Discussed with patient at length health risks of obesity and need for diet and exercise      Recurrent major depressive disorder, in partial remission ([facility] [address]   Doing well now    Mixed hyperlipidemia   Cont meds but add increased fish oil dosing and follow        Encounter Diagnoses   Name Primary?  Routine general medical examination at a health care facility Yes    Obstructive sleep apnea syndrome     Type 2 diabetes mellitus without complication, without long-term current use of insulin (HCC)     Other specified hypothyroidism     Class 2 severe obesity due to excess calories with serious comorbidity and body mass index (BMI) of 38.0 to 38.9 in [facility])     Recurrent major depressive disorder, in partial remission ([address]     Mixed hyperlipidemia        No orders of the defined types were placed in this encounter. Medicare Annual Wellness Visit  Name: Dahiana Mayes Date: 12/14/2021   MRN: <[id number]> Sex: Female   Age: 70 y.o.  Ethnicity: 15 MG tablet Take 1 tablet by mouth daily Yes Coleman Cochran MD   metoprolol succinate (TOPROL XL) 100 MG extended release tablet TAKE 1 TABLET EVERY DAY Yes Coleman Cochran MD   dilTIAZem (CARDIZEM CD) 120 MG extended release capsule Take 1 capsule by mouth daily Yes Coleman Cochran MD   levothyroxine (SYNTHROID) 25 MCG tablet Take 1 tablet by mouth Daily Yes Coleman Cochran MD   Omega-3 Fatty Acids (FISH OIL) 1000 MG CAPS Take 2 capsules by mouth 2 times daily Yes Coleman Cochran MD   aspirin 81 MG tablet Take 81 mg by mouth daily  Yes Historical Provider, MD         Past Medical History:   Diagnosis Date    Arthritis     shoulders hips knees    Dental crowns present     Diabetes mellitus ([facility] [address]     Hyperlipidemia     Hypertension     THERESA on CPAP     Pericarditis 9/25/2018    Onset 2008 w 2 hospitalizations Treated w medrol prn for flares     Thyroid disease        Past Surgical History:   Procedure Laterality Date    BREAST BIOPSY Right     years ago; benign    COLONOSCOPY      ENDOSCOPY, COLON, DIAGNOSTIC      EYE SURGERY Bilateral     cataracts    HYSTERECTOMY      JOINT REPLACEMENT Left 2014    JOINT REPLACEMENT Bilateral 2016    knees    SHOULDER ARTHROPLASTY Bilateral 2013/2018    GONSALO AND BSO  2001    TOTAL HIP ARTHROPLASTY Right 3/30/2021    DIRECT ANTERIOR APPROACH RIGHT TOTAL HIP ARTHROPLASTY performed by Kodak Hernandez MD at [facility] OR         Family History   Problem Relation Age of Onset    Ovarian Cancer Maternal Aunt     Breast Cancer Paternal Aunt        CareTeam (Including outside providers/suppliers regularly involved in providing care):   Patient Care Team:  Coleman Cochran MD as PCP - General (Internal Medicine)  Coleman Cochran MD as PCP - [facility] Empaneled Provider  Jose Chin (Psychology)    Wt Readings from Last 3 Encounters:   12/14/21 262 lb (118.8 kg)   06/30/21 275 lb (124.7 kg)   03/30/21 268 lb (121.6 kg)     Vitals:    12/14/21 1153   BP: 138/88   Temp: 98.1 °F (36.7 °C) Weight: 262 lb (118.8 kg)   Height: 5' 9\" (1.753 m)     Body mass index is 38.69 kg/m². Based upon direct observation of the patient, evaluation of cognition reveals recent and remote memory intact. Patient's complete Health Risk Assessment and screening values have been reviewed and are found in Flowsheets. The following problems were reviewed today and where indicated follow up appointments were made and/or referrals ordered.     Positive Risk Factor Screenings with Interventions:           Health Habits/Nutrition:     Body mass index: (!) 38.69  Health Habits/Nutrition Interventions:  · Nutritional issues:  see note    Hearing/Vision:  No exam data present     Hearing/Vision Interventions:  · Vision concerns:  for appt soon     ADL:     ADL Interventions:  · Patient declines any further evaluation/treatment for this issue    Personalized Preventive Plan   Current Health Maintenance Status  Immunization History   Administered Date(s) Administered    COVID-19, Cates Peter, PF, 30mcg/0.3mL 02/06/2021, 02/27/2021, 09/26/2021    Influenza Virus Vaccine 01/23/2014, 10/21/2014, 10/03/2019    Influenza, High Dose (Fluzone 65 yrs and older) 10/09/2020, 10/18/2021    Influenza, Intradermal, Quadrivalent, Preservative Free 09/20/2018    Influenza, MDCK Quadv, IM, PF (Flucelvax 2 yrs and older) 10/12/2017    Influenza, MDCK, Preservative free 10/08/2015    Influenza, Quadv, Recombinant, IM PF (Flublok 18 yrs and older) 10/03/2019    Influenza, Quadv, adjuvanted, 65 yrs +, IM, PF (Fluad) 10/09/2020    Pneumococcal Conjugate 13-valent ([id number]) 10/03/2019    Pneumococcal Polysaccharide ([id number]) 06/13/2017    Tdap (Boostrix, Adacel) 05/23/2013, 10/03/2019    Zoster Recombinant (Shingrix) 10/18/2021        Health Maintenance   Topic Date Due    Diabetic retinal exam  10/12/2021    Annual Wellness Visit (AWV)  12/02/2021    Shingles Vaccine (2 of 2) 12/13/2021    Diabetic foot exam  03/23/2022    Diabetic microalbuminuria test  07/27/2022    TSH testing  07/27/2022    Potassium monitoring  07/27/2022    Creatinine monitoring  07/27/2022    A1C test (Diabetic or Prediabetic)  12/09/2022    Lipid screen  12/09/2022    Breast cancer screen  06/30/2023    DTaP/Tdap/Td vaccine (3 - Td or Tdap) 10/03/2029    Colon cancer screen colonoscopy  03/10/2030    DEXA (modify frequency per FRAX score)  Completed    Flu vaccine  Completed    Pneumococcal 65+ years Vaccine  Completed    COVID-19 Vaccine  Completed    Hepatitis C screen  Completed    Hepatitis A vaccine  Aged Out    Hib vaccine  Aged Out    Meningococcal (ACWY) vaccine  Aged Out     Recommendations for Qian Xiaoâ€™er Due: see orders and patient instructions/AVS.  . Recommended screening schedule for the next 5-10 years is provided to the patient in written form: see Patient Instructions/AVS.    Sierra Pérez was seen today for medicare aw. Diagnoses and all orders for this visit:    Routine general medical examination at a health care facility    Obstructive sleep apnea syndrome    Type 2 diabetes mellitus without complication, without long-term current use of insulin (HCC)    Other specified hypothyroidism    Class 2 severe obesity due to excess calories with serious comorbidity and body mass index (BMI) of 38.0 to 38.9 in adult [facility])    Recurrent major depressive disorder, in partial remission ([facility] [address]    Mixed hyperlipidemia    Other orders  -     Omega-3 Fatty Acids (FISH OIL) 1000 MG CAPS;  Take 2 capsules by mouth 2 times daily

## 2021-12-14 NOTE — PATIENT INSTRUCTIONS
After I leave I would recommend  Dr. Oracio Banks- starts in August  Zachariah Joyce  [phone]    Check bp 3-4 times a week preferably when you get home from work and other days in am after sitting for 5 mins but no caffeine or exercise for 30 mins prior-  and in a chair w a back and both feet down-recheck in another 5 mins  Goal is under 130/80  Call if not coming down below this regularly in the next 2-3 weeks    Personalized Preventive Plan for Maricruz Zabala - 12/14/2021  Medicare offers a range of preventive health benefits. Some of the tests and screenings are paid in full while other may be subject to a deductible, co-insurance, and/or copay. Some of these benefits include a comprehensive review of your medical history including lifestyle, illnesses that may run in your family, and various assessments and screenings as appropriate. After reviewing your medical record and screening and assessments performed today your provider may have ordered immunizations, labs, imaging, and/or referrals for you. A list of these orders (if applicable) as well as your Preventive Care list are included within your After Visit Summary for your review. Other Preventive Recommendations:    · A preventive eye exam performed by an eye specialist is recommended every 1-2 years to screen for glaucoma; cataracts, macular degeneration, and other eye disorders. · A preventive dental visit is recommended every 6 months. · Try to get at least 150 minutes of exercise per week or 10,000 steps per day on a pedometer . · Order or download the FREE \"Exercise & Physical Activity: Your Everyday Guide\" from [employer]. Call 1-384.649.7905 or search [employer] online. · You need 5807-2952 mg of calcium and 5581-1223 IU of vitamin D per day.  It is possible to meet your calcium requirement with diet alone, but a vitamin D supplement is usually necessary to meet this goal.  · When exposed to the sun, use a sunscreen that protects against both UVA and UVB radiation with an SPF of 30 or greater. Reapply every 2 to 3 hours or after sweating, drying off with a towel, or swimming. · Always wear a seat belt when traveling in a car. Always wear a helmet when riding a bicycle or motorcycle.

## 2021-12-20 ENCOUNTER — TELEPHONE (OUTPATIENT)
Dept: INTERNAL MEDICINE CLINIC | Age: 71
End: 2021-12-20

## 2021-12-20 DIAGNOSIS — E78.2 MIXED HYPERLIPIDEMIA: Primary | ICD-10-CM

## 2021-12-20 DIAGNOSIS — F33.41 RECURRENT MAJOR DEPRESSIVE DISORDER, IN PARTIAL REMISSION (HCC): ICD-10-CM

## 2021-12-20 DIAGNOSIS — E03.8 OTHER SPECIFIED HYPOTHYROIDISM: ICD-10-CM

## 2021-12-20 DIAGNOSIS — E11.9 TYPE 2 DIABETES MELLITUS WITHOUT COMPLICATION, WITHOUT LONG-TERM CURRENT USE OF INSULIN (HCC): ICD-10-CM

## 2021-12-20 NOTE — TELEPHONE ENCOUNTER
----- Message from Dimtriy Mary sent at 12/20/2021  3:18 PM EST -----  Subject: Appointment Request    Reason for Call: Routine Existing Condition Follow Up (Diabetes)    QUESTIONS  Type of Appointment? New Patient/New to Provider  Reason for appointment request? No appointments available during search  Additional Information for Provider? Pt called to schedule an appt to see   Dr. Deshaun Varghese, She was told Dr. Jorge Briseno would be leaving the practice and she   would be switched to Dr. Deshaun Varghese. She needs to have a DM F/U and bloodwork   scheduled in June, and she will also need to switched her PCP with the   prescription company and needs to know when to switch the name of her pcp   in their system. She also wants to know who she would see in the meantime   sine Dr. Deshaun Varghese will be on leave until March. Call pt to schedule 6 mnth DM   F/U and advise.  ---------------------------------------------------------------------------  --------------  CALL BACK INFO  What is the best way for the office to contact you? OK to leave message on   voicemail  Preferred Call Back Phone Number? [phone]  ---------------------------------------------------------------------------  --------------  SCRIPT ANSWERS  Relationship to Patient? Self  Specialty Confirmation? Primary Care  Is this follow up request related to routine Diabetes Management? Yes  Have you been diagnosed with, awaiting test results for, or told that you   are suspected of having COVID-19 (Coronavirus)? (If patient has tested   negative or was tested as a requirement for work, school, or travel and   not based on symptoms, answer no)? No  Within the past two weeks have you developed any of the following symptoms   (answer no if symptoms have been present longer than 2 weeks or began   more than 2 weeks ago)?  Fever or Chills, Cough, Shortness of breath or   difficulty breathing, Loss of taste or smell, Sore throat, Nasal   congestion, Sneezing or runny nose, Fatigue or generalized body aches   (answer no if pain is specific to a body part e.g. back pain), Diarrhea,   Headache? No  Have you had close contact with someone with COVID-19 in the last 14 days? No  (Service Expert  click yes below to proceed with [employer] As Usual   Scheduling)?  Yes

## 2021-12-30 DIAGNOSIS — E78.00 PURE HYPERCHOLESTEROLEMIA: ICD-10-CM

## 2021-12-30 RX ORDER — OMEPRAZOLE 40 MG/1
40 CAPSULE, DELAYED RELEASE ORAL DAILY
Qty: 90 CAPSULE | Refills: 3 | Status: SHIPPED | OUTPATIENT
Start: 2021-12-30 | End: 2022-11-02 | Stop reason: SDUPTHER

## 2021-12-30 RX ORDER — LEVOTHYROXINE SODIUM 0.03 MG/1
25 TABLET ORAL DAILY
Qty: 90 TABLET | Refills: 3 | Status: SHIPPED | OUTPATIENT
Start: 2021-12-30

## 2021-12-30 RX ORDER — METOPROLOL SUCCINATE 100 MG/1
TABLET, EXTENDED RELEASE ORAL
Qty: 90 TABLET | Refills: 3 | Status: SHIPPED | OUTPATIENT
Start: 2021-12-30

## 2021-12-30 RX ORDER — ATORVASTATIN CALCIUM 40 MG/1
40 TABLET, FILM COATED ORAL NIGHTLY
Qty: 90 TABLET | Refills: 3 | Status: SHIPPED | OUTPATIENT
Start: 2021-12-30 | End: 2022-11-02 | Stop reason: SDUPTHER

## 2021-12-30 RX ORDER — BUPROPION HYDROCHLORIDE 150 MG/1
TABLET ORAL
Qty: 90 TABLET | Refills: 3 | Status: SHIPPED | OUTPATIENT
Start: 2021-12-30 | End: 2022-11-02 | Stop reason: SDUPTHER

## 2022-01-07 ENCOUNTER — TELEPHONE (OUTPATIENT)
Dept: INTERNAL MEDICINE CLINIC | Age: 72
End: 2022-01-07

## 2022-01-07 RX ORDER — OLMESARTAN MEDOXOMIL AND HYDROCHLOROTHIAZIDE 40/12.5 40; 12.5 MG/1; MG/1
TABLET ORAL
Qty: 90 TABLET | Refills: 3 | Status: SHIPPED | OUTPATIENT
Start: 2022-01-07

## 2022-01-07 RX ORDER — METFORMIN HYDROCHLORIDE 500 MG/1
1000 TABLET, EXTENDED RELEASE ORAL 2 TIMES DAILY WITH MEALS
Qty: 360 TABLET | Refills: 3 | Status: SHIPPED | OUTPATIENT
Start: 2022-01-07

## 2022-01-07 RX ORDER — DILTIAZEM HYDROCHLORIDE 120 MG/1
120 CAPSULE, COATED, EXTENDED RELEASE ORAL DAILY
Qty: 90 CAPSULE | Refills: 3 | Status: SHIPPED | OUTPATIENT
Start: 2022-01-07

## 2022-01-07 RX ORDER — MELOXICAM 15 MG/1
15 TABLET ORAL DAILY
Qty: 90 TABLET | Refills: 3 | Status: SHIPPED | OUTPATIENT
Start: 2022-01-07

## 2022-01-07 NOTE — TELEPHONE ENCOUNTER
Patient Needs a med refill on meloxicam (MOBIC) 15 MG tablet     dilTIAZem    olmesartan-hydroCHLOROthiazide (BENICAR HCT) 40-12.5 MG per tablet     metFORMIN (GLUCOPHAGE-XR) 500 MG extended release tablet         Please send to [address] [facility], [facility] [address]    Please call to advise

## 2022-04-27 ENCOUNTER — TELEPHONE (OUTPATIENT)
Dept: INTERNAL MEDICINE CLINIC | Age: 72
End: 2022-04-27

## 2022-04-27 DIAGNOSIS — R10.9 ABDOMINAL PAIN, UNSPECIFIED ABDOMINAL LOCATION: Primary | ICD-10-CM

## 2022-06-08 ENCOUNTER — HOSPITAL ENCOUNTER (OUTPATIENT)
Dept: GENERAL RADIOLOGY | Age: 72
Discharge: HOME | End: 2022-06-08
Payer: MEDICARE

## 2022-06-08 DIAGNOSIS — K56.609 SMALL BOWEL OBSTRUCTION (HCC): ICD-10-CM

## 2022-06-08 PROCEDURE — 74250 X-RAY XM SM INT 1CNTRST STD: CPT

## 2022-06-16 DIAGNOSIS — E03.8 OTHER SPECIFIED HYPOTHYROIDISM: ICD-10-CM

## 2022-06-16 DIAGNOSIS — E11.9 TYPE 2 DIABETES MELLITUS WITHOUT COMPLICATION, WITHOUT LONG-TERM CURRENT USE OF INSULIN (HCC): ICD-10-CM

## 2022-06-16 DIAGNOSIS — E78.2 MIXED HYPERLIPIDEMIA: ICD-10-CM

## 2022-06-16 DIAGNOSIS — F33.41 RECURRENT MAJOR DEPRESSIVE DISORDER, IN PARTIAL REMISSION (HCC): ICD-10-CM

## 2022-06-16 LAB
A/G RATIO: 1.8 (ref 1.1–2.2)
ALBUMIN SERPL-MCNC: 4.4 G/DL (ref 3.4–5)
ALP BLD-CCNC: 93 U/L (ref 40–129)
ALT SERPL-CCNC: 11 U/L (ref 10–40)
ANION GAP SERPL CALCULATED.3IONS-SCNC: 13 MMOL/L (ref 3–16)
AST SERPL-CCNC: 11 U/L (ref 15–37)
BASOPHILS ABSOLUTE: 0 K/UL (ref 0–0.2)
BASOPHILS RELATIVE PERCENT: 0.4 %
BILIRUB SERPL-MCNC: 0.4 MG/DL (ref 0–1)
BUN BLDV-MCNC: 13 MG/DL (ref 7–20)
CALCIUM SERPL-MCNC: 9.8 MG/DL (ref 8.3–10.6)
CHLORIDE BLD-SCNC: 102 MMOL/L (ref 99–110)
CHOLESTEROL, TOTAL: 147 MG/DL (ref 0–199)
CO2: 27 MMOL/L (ref 21–32)
CREAT SERPL-MCNC: <0.5 MG/DL (ref 0.6–1.2)
CREATININE URINE: 85 MG/DL (ref 28–259)
EOSINOPHILS ABSOLUTE: 0.4 K/UL (ref 0–0.6)
EOSINOPHILS RELATIVE PERCENT: 5.1 %
ESTIMATED AVERAGE GLUCOSE: 131.2 MG/DL
GFR AFRICAN AMERICAN: >60
GFR NON-AFRICAN AMERICAN: >60
GLUCOSE BLD-MCNC: 127 MG/DL (ref 70–99)
HBA1C MFR BLD: 6.2 %
HCT VFR BLD CALC: 38.4 % (ref 36–48)
HDLC SERPL-MCNC: 42 MG/DL (ref 40–60)
HEMOGLOBIN: 12.6 G/DL (ref 12–16)
LDL CHOLESTEROL CALCULATED: 74 MG/DL
LYMPHOCYTES ABSOLUTE: 1.4 K/UL (ref 1–5.1)
LYMPHOCYTES RELATIVE PERCENT: 20.1 %
MCH RBC QN AUTO: 29.1 PG (ref 26–34)
MCHC RBC AUTO-ENTMCNC: 32.8 G/DL (ref 31–36)
MCV RBC AUTO: 88.6 FL (ref 80–100)
MICROALBUMIN UR-MCNC: <1.2 MG/DL
MICROALBUMIN/CREAT UR-RTO: NORMAL MG/G (ref 0–30)
MONOCYTES ABSOLUTE: 0.5 K/UL (ref 0–1.3)
MONOCYTES RELATIVE PERCENT: 7.4 %
NEUTROPHILS ABSOLUTE: 4.8 K/UL (ref 1.7–7.7)
NEUTROPHILS RELATIVE PERCENT: 67 %
PDW BLD-RTO: 15.1 % (ref 12.4–15.4)
PLATELET # BLD: 327 K/UL (ref 135–450)
PMV BLD AUTO: 7.7 FL (ref 5–10.5)
POTASSIUM SERPL-SCNC: 4.4 MMOL/L (ref 3.5–5.1)
RBC # BLD: 4.34 M/UL (ref 4–5.2)
SODIUM BLD-SCNC: 142 MMOL/L (ref 136–145)
TOTAL PROTEIN: 6.9 G/DL (ref 6.4–8.2)
TRIGL SERPL-MCNC: 157 MG/DL (ref 0–150)
TSH SERPL DL<=0.05 MIU/L-ACNC: 2.11 UIU/ML (ref 0.27–4.2)
VLDLC SERPL CALC-MCNC: 31 MG/DL
WBC # BLD: 7.1 K/UL (ref 4–11)

## 2022-06-22 ENCOUNTER — OFFICE VISIT (OUTPATIENT)
Dept: INTERNAL MEDICINE CLINIC | Age: 72
End: 2022-06-22
Payer: MEDICARE

## 2022-06-22 VITALS
HEART RATE: 71 BPM | BODY MASS INDEX: 37.86 KG/M2 | OXYGEN SATURATION: 98 % | DIASTOLIC BLOOD PRESSURE: 82 MMHG | SYSTOLIC BLOOD PRESSURE: 132 MMHG | WEIGHT: 255.6 LBS | TEMPERATURE: 97.4 F | HEIGHT: 69 IN

## 2022-06-22 DIAGNOSIS — I10 ESSENTIAL HYPERTENSION: ICD-10-CM

## 2022-06-22 DIAGNOSIS — E11.9 TYPE 2 DIABETES MELLITUS WITHOUT COMPLICATION, WITHOUT LONG-TERM CURRENT USE OF INSULIN (HCC): Primary | ICD-10-CM

## 2022-06-22 DIAGNOSIS — E03.8 OTHER SPECIFIED HYPOTHYROIDISM: ICD-10-CM

## 2022-06-22 DIAGNOSIS — F33.41 RECURRENT MAJOR DEPRESSIVE DISORDER, IN PARTIAL REMISSION (HCC): ICD-10-CM

## 2022-06-22 DIAGNOSIS — E66.01 CLASS 2 SEVERE OBESITY DUE TO EXCESS CALORIES WITH SERIOUS COMORBIDITY AND BODY MASS INDEX (BMI) OF 38.0 TO 38.9 IN ADULT (HCC): ICD-10-CM

## 2022-06-22 DIAGNOSIS — E66.01 SEVERE OBESITY (BMI 35.0-39.9) WITH COMORBIDITY (HCC): ICD-10-CM

## 2022-06-22 PROBLEM — M79.672 INTRACTABLE LEFT HEEL PAIN: Status: RESOLVED | Noted: 2018-09-25 | Resolved: 2022-06-22

## 2022-06-22 PROBLEM — S16.1XXA NECK STRAIN: Status: RESOLVED | Noted: 2018-11-27 | Resolved: 2022-06-22

## 2022-06-22 PROBLEM — M16.11 ARTHRITIS OF RIGHT HIP: Status: RESOLVED | Noted: 2018-09-25 | Resolved: 2022-06-22

## 2022-06-22 PROCEDURE — 1036F TOBACCO NON-USER: CPT | Performed by: INTERNAL MEDICINE

## 2022-06-22 PROCEDURE — 99214 OFFICE O/P EST MOD 30 MIN: CPT | Performed by: INTERNAL MEDICINE

## 2022-06-22 PROCEDURE — G8427 DOCREV CUR MEDS BY ELIG CLIN: HCPCS | Performed by: INTERNAL MEDICINE

## 2022-06-22 PROCEDURE — G8399 PT W/DXA RESULTS DOCUMENT: HCPCS | Performed by: INTERNAL MEDICINE

## 2022-06-22 PROCEDURE — 3017F COLORECTAL CA SCREEN DOC REV: CPT | Performed by: INTERNAL MEDICINE

## 2022-06-22 PROCEDURE — 1090F PRES/ABSN URINE INCON ASSESS: CPT | Performed by: INTERNAL MEDICINE

## 2022-06-22 PROCEDURE — G8417 CALC BMI ABV UP PARAM F/U: HCPCS | Performed by: INTERNAL MEDICINE

## 2022-06-22 PROCEDURE — 2022F DILAT RTA XM EVC RTNOPTHY: CPT | Performed by: INTERNAL MEDICINE

## 2022-06-22 PROCEDURE — 1123F ACP DISCUSS/DSCN MKR DOCD: CPT | Performed by: INTERNAL MEDICINE

## 2022-06-22 PROCEDURE — 3044F HG A1C LEVEL LT 7.0%: CPT | Performed by: INTERNAL MEDICINE

## 2022-06-22 ASSESSMENT — PATIENT HEALTH QUESTIONNAIRE - PHQ9
6. FEELING BAD ABOUT YOURSELF - OR THAT YOU ARE A FAILURE OR HAVE LET YOURSELF OR YOUR FAMILY DOWN: 0
3. TROUBLE FALLING OR STAYING ASLEEP: 0
7. TROUBLE CONCENTRATING ON THINGS, SUCH AS READING THE NEWSPAPER OR WATCHING TELEVISION: 0
1. LITTLE INTEREST OR PLEASURE IN DOING THINGS: 0
9. THOUGHTS THAT YOU WOULD BE BETTER OFF DEAD, OR OF HURTING YOURSELF: 0
SUM OF ALL RESPONSES TO PHQ QUESTIONS 1-9: 0
SUM OF ALL RESPONSES TO PHQ QUESTIONS 1-9: 0
8. MOVING OR SPEAKING SO SLOWLY THAT OTHER PEOPLE COULD HAVE NOTICED. OR THE OPPOSITE, BEING SO FIGETY OR RESTLESS THAT YOU HAVE BEEN MOVING AROUND A LOT MORE THAN USUAL: 0
4. FEELING TIRED OR HAVING LITTLE ENERGY: 0
10. IF YOU CHECKED OFF ANY PROBLEMS, HOW DIFFICULT HAVE THESE PROBLEMS MADE IT FOR YOU TO DO YOUR WORK, TAKE CARE OF THINGS AT HOME, OR GET ALONG WITH OTHER PEOPLE: 0
SUM OF ALL RESPONSES TO PHQ QUESTIONS 1-9: 0
SUM OF ALL RESPONSES TO PHQ QUESTIONS 1-9: 0
2. FEELING DOWN, DEPRESSED OR HOPELESS: 0
5. POOR APPETITE OR OVEREATING: 0
SUM OF ALL RESPONSES TO PHQ9 QUESTIONS 1 & 2: 0

## 2022-06-22 ASSESSMENT — ENCOUNTER SYMPTOMS
BLOOD IN STOOL: 0
DIARRHEA: 0
SHORTNESS OF BREATH: 0
ABDOMINAL PAIN: 0

## 2022-06-22 NOTE — ASSESSMENT & PLAN NOTE
Fairly controlled  -Continue olmesartan with HCTZ 40/12.5 mg, diltiazem 120 mg, metoprolol 100 mg  -Repeat BMP before next visit

## 2022-06-22 NOTE — PROGRESS NOTES
[facility]  Establish care visit   2022    Bethany Huggins (:  1950) alaina 70 y.o. female, here to establish care. Chief Complaint   Patient presents with   Blase Liming Establish Care        Patient Active Problem List   Diagnosis    Personal history of colonic polyps    Diverticulosis of large intestine without hemorrhage    Other specified hypothyroidism    Mixed hyperlipidemia    Recurrent major depressive disorder, in partial remission ([address]    Type 2 diabetes mellitus without complication, without long-term current use of insulin ([address]    Primary osteoarthritis involving multiple joints    Class 2 severe obesity due to excess calories with serious comorbidity and body mass index (BMI) of 38.0 to 38.9 in adult ([address]    Allergic rhinitis    Obstructive sleep apnea syndrome    Macular degeneration of both eyes    Hearing loss    Essential hypertension       ASSESSMENT/ PLAN:    Type 2 diabetes mellitus without complication, without long-term current use of insulin ([address]  Overall doing well from a clinical standpoint. Well controlled, last a1c 6.2% . No known DM complications.  -Continue metformin 1000 mg twice daily  - Repeat HbA1c for next visit. AIC goal< 7    - BP is within desired goal.  On ARB  - Reviewed recent lipids , continue Lipitor 40 mg  Lab Results   Component Value Date    LDLCALC 74 2022    CREATININE <0.5 (L) 2022   - Last foot exam: ?  - Last eye exam: ?  - low carb diet and regular exercise  - egular foot care  - repeat BMP before next visit, no microalbuminuria   - Up-to-date with PSV23 vaccine , flu shot.   - Follow up in 6 months    Other specified hypothyroidism  Clinically and chemically euthyroid  -Continue Synthroid 25 mcg for now    Recurrent major depressive disorder, in partial remission (HCC)  Well-controlled  -Continue Wellbutrin 150 mg  -Continue therapist    Class 2 severe obesity due to excess calories with serious comorbidity and body mass index (BMI) of 38.0 to 38.9 in adult [facility])  - Try to keep healthy diet and regular exercise as able    Essential hypertension  Fairly controlled  -Continue olmesartan with HCTZ 40/12.5 mg, diltiazem 120 mg, metoprolol 100 mg  -Repeat BMP before next visit      Orders Placed This Encounter   Procedures    Hemoglobin A1C    Basic Metabolic Panel     No orders of the defined types were placed in this encounter. Medications Discontinued During This Encounter   Medication Reason    aspirin 81 MG tablet LIST CLEANUP    Multiple Vitamins-Minerals (PRESERVISION AREDS 2 PO) ERROR    Omega-3 Fatty Acids (FISH OIL) 1000 MG CAPS ERROR        Return in about 6 months (around 12/22/2022). HPI  70years old woman with history of type 2 diabetes, hypertension, hyperlipidemia, hypothyroidism, history of colonic polyps, THERESA, establishing care. Recently had small bowel follow-through study which was unremarkable after episode of severe lower abdominal pain for which she went to the ED in April. She sees Dr. Emily Arenas from GI and gets colonoscopies every 3 years, next scheduled next year.   On Synthroid 25 mcg for many years now  On Wellbutrin for depression, also sees a therapist, overall well controlled        HISTORIES  Current Outpatient Medications on File Prior to Visit   Medication Sig Dispense Refill    Multiple Vitamins-Minerals (ICAPS AREDS 2 PO) Take by mouth      olmesartan-hydroCHLOROthiazide (BENICAR HCT) 40-12.5 MG per tablet TAKE 1 TABLET DAILY 90 tablet 3    metFORMIN (GLUCOPHAGE-XR) 500 MG extended release tablet Take 2 tablets by mouth 2 times daily (with meals) TAKE TWO (2) TWICE DAILY 360 tablet 3    meloxicam (MOBIC) 15 MG tablet Take 1 tablet by mouth daily 90 tablet 3    dilTIAZem (CARDIZEM CD) 120 MG extended release capsule Take 1 capsule by mouth daily 90 capsule 3    buPROPion (WELLBUTRIN XL) 150 MG extended release tablet TAKE 1 TABLET EVERY MORNING 90 tablet 3  atorvastatin (LIPITOR) 40 MG tablet Take 1 tablet by mouth nightly TAKE 1 TABLET EVERY DAY 90 tablet 3    omeprazole (PRILOSEC) 40 MG delayed release capsule Take 1 capsule by mouth daily 90 capsule 3    metoprolol succinate (TOPROL XL) 100 MG extended release tablet TAKE 1 TABLET EVERY DAY 90 tablet 3    levothyroxine (SYNTHROID) 25 MCG tablet Take 1 tablet by mouth Daily 90 tablet 3    Cholecalciferol (VITAMIN D3) 50 MCG (2000 UT) CAPS Take by mouth      Ascorbic Acid (VITAMIN C) 1000 MG tablet Take 1,000 mg by mouth daily      Multiple Minerals (CALCIUM/MAGNESIUM/ZINC PO) Take by mouth      metroNIDAZOLE (METROCREAM) 0.75 % cream Daily      Omega-3 Fatty Acids (FISH OIL) 1000 MG CAPS Take 2 capsules by mouth 2 times daily (Patient not taking: Reported on 6/22/2022) 90 capsule 3     No current facility-administered medications on file prior to visit.         Allergies   Allergen Reactions    Gluten Meal Other (See Comments)     Wheat-Causes phlegm     Other      Dogs-pt unknown reaction     Eggs Or Egg-Derived Products      Gets egg free immunizations   Patient able to eat eggs     Past Medical History:   Diagnosis Date    Arthritis     shoulders hips knees    Dental crowns present     Diabetes mellitus ([address]     Hyperlipidemia     Hypertension     THERESA on CPAP     Pericarditis 9/25/2018    Onset 2008 w 2 hospitalizations Treated w medrol prn for flares     Thyroid disease      Patient Active Problem List   Diagnosis    Personal history of colonic polyps    Diverticulosis of large intestine without hemorrhage    Other specified hypothyroidism    Mixed hyperlipidemia    Recurrent major depressive disorder, in partial remission ([address]    Type 2 diabetes mellitus without complication, without long-term current use of insulin (HCC)    Primary osteoarthritis involving multiple joints    Class 2 severe obesity due to excess calories with serious comorbidity and body mass index (BMI) of 38.0 to 38.9 in adult [facility])    Allergic rhinitis    Obstructive sleep apnea syndrome    Macular degeneration of both eyes    Hearing loss    Essential hypertension     Past Surgical History:   Procedure Laterality Date    BREAST BIOPSY Right     years ago; benign    COLONOSCOPY      ENDOSCOPY, COLON, DIAGNOSTIC      EYE SURGERY Bilateral     cataracts    HYSTERECTOMY (CERVIX STATUS UNKNOWN)      JOINT REPLACEMENT Left 2014    JOINT REPLACEMENT Bilateral 2016    knees    SHOULDER ARTHROPLASTY Bilateral 2013/2018    GONSALO AND BSO (CERVIX REMOVED)  2001    TOTAL HIP ARTHROPLASTY Right 3/30/2021    DIRECT ANTERIOR APPROACH RIGHT TOTAL HIP ARTHROPLASTY performed [facility]ell [facility] History     Socioeconomic History    Marital status:      Spouse name: Not on file    Number of children: Not on file    Years of education: Not on file    Highest education level: Not on file   Occupational History    Not on file   Tobacco Use    Smoking status: Never Smoker    Smokeless tobacco: Never Used   Vaping Use    Vaping Use: Never used   Substance and Sexual Activity    Alcohol use: Yes     Alcohol/week: 2.0 standard drinks     Types: 1 Glasses of wine, 1 Shots of liquor per week     Comment: 2-3 times a week     Drug use: Not on file    Sexual activity: Not on file   Other Topics Concern    Not on file   Social History Narrative    Not on file     Social Determinants of Health     Financial Resource Strain: Low Risk     Difficulty of Paying Living Expenses: Not hard at all   Food Insecurity: No Food Insecurity    Worried About Running Out of Food in the Last Year: Never true    Pato of Food in the Last Year: Never true   Transportation Needs: No Transportation Needs    Lack of Transportation (Medical): No    Lack of Transportation (Non-Medical):  No   Physical Activity: Insufficiently Active    Days of Exercise per Week: 2 days    Minutes of Exercise per Session: 40 min Stress: No Stress Concern Present    Feeling of Stress : Not at all   Social Connections: Moderately Isolated    Frequency of Communication with Friends and Family: More than three times a week    Frequency of Social Gatherings with Friends and Family: Twice a week    Attends Mandaen Services: Never    Active Member of Clubs or Organizations: Not on file    Attends Club or Organization Meetings: Never    Marital Status:    Intimate Partner Violence: Not At Risk    Fear of Current or Ex-Partner: No    Emotionally Abused: No    Physically Abused: No    Sexually Abused: No   Housing Stability: Unknown    Unable to Pay for Housing in the Last Year: No    Number of [place] in the Last Year: Not on file    Unstable Housing in the Last Year: No      Family History   Problem Relation Age of Onset    Ovarian Cancer Maternal Aunt     Breast Cancer Paternal Aunt          ROS  Review of Systems   Constitutional: Negative for unexpected weight change. Respiratory: Negative for shortness of breath. Cardiovascular: Negative for chest pain, palpitations and leg swelling. Gastrointestinal: Negative for abdominal pain, blood in stool and diarrhea. PE  Vitals:    06/22/22 0950   BP: 132/82   Site: Left Upper Arm   Position: Sitting   Pulse: 71   Temp: 97.4 °F (36.3 °C)   SpO2: 98%   Weight: 255 lb 9.6 oz (115.9 kg)   Height: 5' 9\" (1.753 m)     Estimated body mass index is 37.75 kg/m² as calculated from the following:    Height as of this encounter: 5' 9\" (1.753 m). Weight as of this encounter: 255 lb 9.6 oz (115.9 kg). Physical Exam  Vitals reviewed. Constitutional:       General: She is not in acute distress. Appearance: Normal appearance. She is well-developed. She is not ill-appearing, toxic-appearing or diaphoretic. HENT:      Head: Normocephalic and atraumatic. Eyes:      General: No scleral icterus.      Conjunctiva/sclera: Conjunctivae normal.      Pupils: Pupils are equal, round, and reactive to light. Cardiovascular:      Rate and Rhythm: Normal rate and regular rhythm. Heart sounds: Normal heart sounds. Pulmonary:      Effort: Pulmonary effort is normal. No respiratory distress. Breath sounds: Normal breath sounds. Abdominal:      General: There is no distension. Palpations: Abdomen is soft. Tenderness: There is no abdominal tenderness. Musculoskeletal:      Cervical back: Normal range of motion and neck supple. Right lower leg: No edema. Left lower leg: No edema. Skin:     General: Skin is warm and dry. Coloration: Skin is not jaundiced. Neurological:      Mental Status: She is alert and oriented to person, place, and time.    Psychiatric:         Behavior: Behavior normal.         Immunization History   Administered Date(s) Administered    COVID-19, 2-[employer] top, DO NOT Dilute, Vikash-Sucrose, 12+ yrs, PF, 30 mcg/0.3 mL dose 04/07/2022    COVID-19, Pfizer Purple top, DILUTE for use, 12+ yrs, 30mcg/0.3mL dose 02/06/2021, 02/27/2021, 09/26/2021    Influenza Virus Vaccine 01/23/2014, 10/21/2014, 10/03/2019    Influenza, High Dose (Fluzone 65 yrs and older) 10/09/2020, 10/18/2021    Influenza, Intradermal, Quadrivalent, Preservative Free 09/20/2018    Influenza, MDCK Quadv, IM, PF (Flucelvax 2 yrs and older) 10/12/2017    Influenza, MDCK, Preservative free 10/08/2015    Influenza, Quadv, Recombinant, IM PF (Flublok 18 yrs and older) 10/03/2019    Influenza, Quadv, adjuvanted, 65 yrs +, IM, PF (Fluad) 10/09/2020    Pneumococcal Conjugate 13-valent ([id number]) 10/03/2019    Pneumococcal Polysaccharide ([id number]) 06/13/2017    Tdap (Boostrix, Adacel) 05/23/2013, 10/03/2019    Zoster Recombinant (Shingrix) 10/18/2021       Health Maintenance   Topic Date Due    Diabetic foot exam  03/23/2022    Depression Monitoring  12/14/2022    Annual Wellness Visit (AWV)  12/15/2022    A1C test (Diabetic or Prediabetic) 06/16/2023    Diabetic microalbuminuria test  06/16/2023    Lipids  06/16/2023    Breast cancer screen  06/30/2023    Diabetic retinal exam  01/14/2024    DTaP/Tdap/Td vaccine (3 - Td or Tdap) 10/03/2029    Colorectal Cancer Screen  03/10/2030    DEXA (modify frequency per FRAX score)  Completed    Flu vaccine  Completed    Shingles vaccine  Completed    Pneumococcal 65+ years Vaccine  Completed    COVID-19 Vaccine  Completed    Hepatitis C screen  Completed    Hepatitis A vaccine  Aged Out    Hib vaccine  Aged Out    Meningococcal (ACWY) vaccine  Aged Out       PSH, PMH, SH and FH reviewed and noted. Recent and past labs, tests and consults also reviewed. Recent or new meds also reviewed. Jovita Huang MD    This dictation was generated by voice recognition computer software. Although all attempts are made to edit the dictation for accuracy, there may be errors in the transcription that are not intended.

## 2022-06-22 NOTE — ASSESSMENT & PLAN NOTE
Overall doing well from a clinical standpoint. Well controlled, last a1c 6.2% 6/22. No known DM complications.  -Continue metformin 1000 mg twice daily  - Repeat HbA1c for next visit. AIC goal< 7    - BP is within desired goal.  On ARB  - Reviewed recent lipids 6/22, continue Lipitor 40 mg  Lab Results   Component Value Date    LDLCALC 74 06/16/2022    CREATININE <0.5 (L) 06/16/2022   - Last foot exam: ?  - Last eye exam: ?  - low carb diet and regular exercise  - egular foot care  - repeat BMP before next visit, no microalbuminuria 6/22  - Up-to-date with PSV23 vaccine , flu shot.   - Follow up in 6 months

## 2022-07-13 ENCOUNTER — HOSPITAL ENCOUNTER (OUTPATIENT)
Dept: MAMMOGRAPHY | Age: 72
Discharge: HOME | End: 2022-07-13
Payer: MEDICARE

## 2022-07-13 VITALS — HEIGHT: 69 IN | BODY MASS INDEX: 37.77 KG/M2 | WEIGHT: 255 LBS

## 2022-07-13 DIAGNOSIS — Z12.31 VISIT FOR SCREENING MAMMOGRAM: ICD-10-CM

## 2022-07-13 PROCEDURE — 77067 SCR MAMMO BI INCL CAD: CPT

## 2022-07-13 PROCEDURE — 77063 BREAST TOMOSYNTHESIS BI: CPT

## 2022-11-02 DIAGNOSIS — E78.00 PURE HYPERCHOLESTEROLEMIA: ICD-10-CM

## 2022-11-02 RX ORDER — ATORVASTATIN CALCIUM 40 MG/1
40 TABLET, FILM COATED ORAL NIGHTLY
Qty: 90 TABLET | Refills: 1 | Status: SHIPPED | OUTPATIENT
Start: 2022-11-02

## 2022-11-02 RX ORDER — OMEPRAZOLE 40 MG/1
40 CAPSULE, DELAYED RELEASE ORAL DAILY
Qty: 90 CAPSULE | Refills: 1 | Status: SHIPPED | OUTPATIENT
Start: 2022-11-02

## 2022-11-02 RX ORDER — BUPROPION HYDROCHLORIDE 150 MG/1
TABLET ORAL
Qty: 90 TABLET | Refills: 1 | Status: SHIPPED | OUTPATIENT
Start: 2022-11-02

## 2022-11-02 NOTE — TELEPHONE ENCOUNTER
Faxed over a refill request on oct 26th looking for a follow up    buPROPion (WELLBUTRIN XL) 150 MG extended release tablet    omeprazole (PRILOSEC) 40 MG delayed release capsule    atorvastatin (LIPITOR) 40 MG tablet

## 2022-11-14 NOTE — TELEPHONE ENCOUNTER
Pt needs a refill on   meloxicam [facility]) 15 MG tablet           [address] [facility] [phone] [facility] [phone]        Please advise

## 2022-11-15 RX ORDER — OLMESARTAN MEDOXOMIL AND HYDROCHLOROTHIAZIDE 40/12.5 40; 12.5 MG/1; MG/1
TABLET ORAL
Qty: 90 TABLET | Refills: 1 | Status: SHIPPED | OUTPATIENT
Start: 2022-11-15

## 2022-11-15 RX ORDER — METFORMIN HYDROCHLORIDE 500 MG/1
1000 TABLET, EXTENDED RELEASE ORAL 2 TIMES DAILY WITH MEALS
Qty: 360 TABLET | Refills: 1 | Status: SHIPPED | OUTPATIENT
Start: 2022-11-15

## 2022-11-15 RX ORDER — DILTIAZEM HYDROCHLORIDE 120 MG/1
120 CAPSULE, COATED, EXTENDED RELEASE ORAL DAILY
Qty: 90 CAPSULE | Refills: 1 | Status: SHIPPED | OUTPATIENT
Start: 2022-11-15

## 2022-11-15 RX ORDER — MELOXICAM 15 MG/1
15 TABLET ORAL DAILY
Qty: 90 TABLET | Refills: 0 | Status: SHIPPED | OUTPATIENT
Start: 2022-11-15

## 2022-11-15 NOTE — TELEPHONE ENCOUNTER
Ms. Kellen Owens states she does take the meloxicam daily. She takes it for the arthritis in her Left foot. She also had both shoulders, both hips and both knees replaced.

## 2022-11-23 ENCOUNTER — TELEPHONE (OUTPATIENT)
Dept: INTERNAL MEDICINE CLINIC | Age: 72
End: 2022-11-23

## 2022-11-23 NOTE — TELEPHONE ENCOUNTER
Calling regarding pt cpap supplies. Medicare is claiming [facility] is not certified to order these items.     Please call supply company back

## 2022-11-23 NOTE — TELEPHONE ENCOUNTER
[phone]  Called [employer](medical service company) back and left a message to call back to let us know exactly what she needs from [facility].

## 2022-12-08 PROBLEM — R82.90 ABNORMAL FINDING ON URINALYSIS: Status: ACTIVE | Noted: 2022-12-08

## 2022-12-15 DIAGNOSIS — E11.9 TYPE 2 DIABETES MELLITUS WITHOUT COMPLICATION, WITHOUT LONG-TERM CURRENT USE OF INSULIN (HCC): ICD-10-CM

## 2022-12-15 LAB
ANION GAP SERPL CALCULATED.3IONS-SCNC: 14 MMOL/L (ref 3–16)
BUN BLDV-MCNC: 17 MG/DL (ref 7–20)
CALCIUM SERPL-MCNC: 9.7 MG/DL (ref 8.3–10.6)
CHLORIDE BLD-SCNC: 102 MMOL/L (ref 99–110)
CO2: 27 MMOL/L (ref 21–32)
CREAT SERPL-MCNC: 0.6 MG/DL (ref 0.6–1.2)
ESTIMATED AVERAGE GLUCOSE: 128.4 MG/DL
GFR SERPL CREATININE-BSD FRML MDRD: >60 ML/MIN/{1.73_M2}
GLUCOSE BLD-MCNC: 130 MG/DL (ref 70–99)
HBA1C MFR BLD: 6.1 %
POTASSIUM SERPL-SCNC: 3.9 MMOL/L (ref 3.5–5.1)
SODIUM BLD-SCNC: 143 MMOL/L (ref 136–145)

## 2022-12-16 SDOH — HEALTH STABILITY: PHYSICAL HEALTH: ON AVERAGE, HOW MANY MINUTES DO YOU ENGAGE IN EXERCISE AT THIS LEVEL?: 30 MIN

## 2022-12-16 SDOH — HEALTH STABILITY: PHYSICAL HEALTH: ON AVERAGE, HOW MANY DAYS PER WEEK DO YOU ENGAGE IN MODERATE TO STRENUOUS EXERCISE (LIKE A BRISK WALK)?: 2 DAYS

## 2022-12-16 ASSESSMENT — PATIENT HEALTH QUESTIONNAIRE - PHQ9
SUM OF ALL RESPONSES TO PHQ QUESTIONS 1-9: 2
SUM OF ALL RESPONSES TO PHQ QUESTIONS 1-9: 2
SUM OF ALL RESPONSES TO PHQ9 QUESTIONS 1 & 2: 2
SUM OF ALL RESPONSES TO PHQ QUESTIONS 1-9: 2
2. FEELING DOWN, DEPRESSED OR HOPELESS: 1
SUM OF ALL RESPONSES TO PHQ QUESTIONS 1-9: 2
1. LITTLE INTEREST OR PLEASURE IN DOING THINGS: 1

## 2022-12-16 ASSESSMENT — LIFESTYLE VARIABLES
HOW OFTEN DO YOU HAVE A DRINK CONTAINING ALCOHOL: 3
HOW MANY STANDARD DRINKS CONTAINING ALCOHOL DO YOU HAVE ON A TYPICAL DAY: 1 OR 2
HOW OFTEN DO YOU HAVE A DRINK CONTAINING ALCOHOL: 2-4 TIMES A MONTH
HOW MANY STANDARD DRINKS CONTAINING ALCOHOL DO YOU HAVE ON A TYPICAL DAY: 1
HOW OFTEN DO YOU HAVE SIX OR MORE DRINKS ON ONE OCCASION: 1

## 2022-12-22 ENCOUNTER — OFFICE VISIT (OUTPATIENT)
Dept: INTERNAL MEDICINE CLINIC | Age: 72
End: 2022-12-22
Payer: MEDICARE

## 2022-12-22 VITALS
BODY MASS INDEX: 37.57 KG/M2 | HEART RATE: 65 BPM | DIASTOLIC BLOOD PRESSURE: 80 MMHG | TEMPERATURE: 97.9 F | WEIGHT: 254.4 LBS | OXYGEN SATURATION: 99 % | SYSTOLIC BLOOD PRESSURE: 138 MMHG

## 2022-12-22 DIAGNOSIS — I10 ESSENTIAL HYPERTENSION: ICD-10-CM

## 2022-12-22 DIAGNOSIS — Z00.00 MEDICARE ANNUAL WELLNESS VISIT, SUBSEQUENT: Primary | ICD-10-CM

## 2022-12-22 DIAGNOSIS — E11.9 TYPE 2 DIABETES MELLITUS WITHOUT COMPLICATION, WITHOUT LONG-TERM CURRENT USE OF INSULIN (HCC): ICD-10-CM

## 2022-12-22 DIAGNOSIS — M79.672 FOOT PAIN, LEFT: ICD-10-CM

## 2022-12-22 PROCEDURE — 3044F HG A1C LEVEL LT 7.0%: CPT | Performed by: INTERNAL MEDICINE

## 2022-12-22 PROCEDURE — G0439 PPPS, SUBSEQ VISIT: HCPCS | Performed by: INTERNAL MEDICINE

## 2022-12-22 PROCEDURE — 3017F COLORECTAL CA SCREEN DOC REV: CPT | Performed by: INTERNAL MEDICINE

## 2022-12-22 PROCEDURE — G8484 FLU IMMUNIZE NO ADMIN: HCPCS | Performed by: INTERNAL MEDICINE

## 2022-12-22 PROCEDURE — 3078F DIAST BP <80 MM HG: CPT | Performed by: INTERNAL MEDICINE

## 2022-12-22 PROCEDURE — 1123F ACP DISCUSS/DSCN MKR DOCD: CPT | Performed by: INTERNAL MEDICINE

## 2022-12-22 PROCEDURE — 3074F SYST BP LT 130 MM HG: CPT | Performed by: INTERNAL MEDICINE

## 2022-12-22 SDOH — ECONOMIC STABILITY: FOOD INSECURITY: WITHIN THE PAST 12 MONTHS, YOU WORRIED THAT YOUR FOOD WOULD RUN OUT BEFORE YOU GOT MONEY TO BUY MORE.: NEVER TRUE

## 2022-12-22 SDOH — ECONOMIC STABILITY: FOOD INSECURITY: WITHIN THE PAST 12 MONTHS, THE FOOD YOU BOUGHT JUST DIDN'T LAST AND YOU DIDN'T HAVE MONEY TO GET MORE.: NEVER TRUE

## 2022-12-22 ASSESSMENT — PATIENT HEALTH QUESTIONNAIRE - PHQ9
SUM OF ALL RESPONSES TO PHQ QUESTIONS 1-9: 3
9. THOUGHTS THAT YOU WOULD BE BETTER OFF DEAD, OR OF HURTING YOURSELF: 0
3. TROUBLE FALLING OR STAYING ASLEEP: 0
2. FEELING DOWN, DEPRESSED OR HOPELESS: 0
8. MOVING OR SPEAKING SO SLOWLY THAT OTHER PEOPLE COULD HAVE NOTICED. OR THE OPPOSITE, BEING SO FIGETY OR RESTLESS THAT YOU HAVE BEEN MOVING AROUND A LOT MORE THAN USUAL: 1
7. TROUBLE CONCENTRATING ON THINGS, SUCH AS READING THE NEWSPAPER OR WATCHING TELEVISION: 0
6. FEELING BAD ABOUT YOURSELF - OR THAT YOU ARE A FAILURE OR HAVE LET YOURSELF OR YOUR FAMILY DOWN: 0
SUM OF ALL RESPONSES TO PHQ QUESTIONS 1-9: 3
10. IF YOU CHECKED OFF ANY PROBLEMS, HOW DIFFICULT HAVE THESE PROBLEMS MADE IT FOR YOU TO DO YOUR WORK, TAKE CARE OF THINGS AT HOME, OR GET ALONG WITH OTHER PEOPLE: 0
5. POOR APPETITE OR OVEREATING: 0
SUM OF ALL RESPONSES TO PHQ9 QUESTIONS 1 & 2: 1
1. LITTLE INTEREST OR PLEASURE IN DOING THINGS: 1
4. FEELING TIRED OR HAVING LITTLE ENERGY: 1

## 2022-12-22 ASSESSMENT — SOCIAL DETERMINANTS OF HEALTH (SDOH): HOW HARD IS IT FOR YOU TO PAY FOR THE VERY BASICS LIKE FOOD, HOUSING, MEDICAL CARE, AND HEATING?: NOT HARD AT ALL

## 2022-12-22 NOTE — PATIENT INSTRUCTIONS
Preventing Falls: Care Instructions  Overview     Getting around your home safely can be a challenge if you have injuries or health problems that make it easy for you to fall. Loose rugs and furniture in walkways are among the dangers for many older people who have problems walking or who have poor eyesight. People who have conditions such as arthritis, osteoporosis, or dementia also have to be careful not to fall. You can make your home safer with a few simple measures. Follow-up care is a key part of your treatment and safety. Be sure to make and go to all appointments, and call your doctor if you are having problems. It's also a good idea to know your test results and keep a list of the medicines you take. How can you care for yourself at home? Taking care of yourself  Exercise regularly to improve your strength, muscle tone, and balance. Walk if you can. Swimming may be a good choice if you cannot walk easily. Have your vision and hearing checked each year or any time you notice a change. If you have trouble seeing and hearing, you might not be able to avoid objects and could lose your balance. Know the side effects of the medicines you take. Ask your doctor or pharmacist whether the medicines you take can affect your balance. Sleeping pills or sedatives can affect your balance. Limit the amount of alcohol you drink. Alcohol can impair your balance and other senses. Ask your doctor whether calluses or corns on your feet need to be removed. If you wear loose-fitting shoes because of calluses or corns, you can lose your balance and fall. Talk to your doctor if you have numbness in your feet. You may get dizzy if you do not drink enough water. To prevent dehydration, drink plenty of fluids. Choose water and other clear liquids. If you have kidney, heart, or liver disease and have to limit fluids, talk with your doctor before you increase the amount of fluids you drink.   Preventing falls at home  Remove raised doorway thresholds, throw rugs, and clutter. Repair loose carpet or raised areas in the floor. Move furniture and electrical cords to keep them out of walking paths. Use nonskid floor wax, and wipe up spills right away, especially on ceramic tile floors. If you use a walker or cane, put rubber tips on it. If you use crutches, clean the bottoms of them regularly with an abrasive pad, such as steel wool. Keep your house well lit, especially stairways, porches, and outside walkways. Use night-lights in areas such as hallways and bathrooms. Add extra light switches or use remote switches (such as switches that go on or off when you clap your hands) to make it easier to turn lights on if you have to get up during the night. Install sturdy handrails on stairways. Move items in your cabinets so that the things you use a lot are on the lower shelves (about waist level). Keep a cordless phone and a flashlight with new batteries by your bed. If possible, put a phone in each of the main rooms of your house, or carry a cell phone in case you fall and cannot reach a phone. Or, you can wear a device around your neck or wrist. You push a button that sends a signal for help. Wear low-heeled shoes that fit well and give your feet good support. Use footwear with nonskid soles. Check the heels and soles of your shoes for wear. Repair or replace worn heels or soles. Do not wear socks without shoes on smooth floors, such as wood. Walk on the grass when the sidewalks are slippery. If you live in an area that gets snow and ice in the winter, sprinkle salt on slippery steps and sidewalks. Or ask a family member or friend to do this for you. Preventing falls in the bath  Install grab bars and nonskid mats inside and outside your shower or tub and near the toilet and sinks. Use shower chairs and bath benches. Use a hand-held shower head that will allow you to sit while showering.   Get into a tub or shower by putting the weaker leg in first. Get out of a tub or shower with your strong side first.  Repair loose toilet seats and consider installing a raised toilet seat to make getting on and off the toilet easier. Keep your bathroom door unlocked while you are in the shower. Where can you learn more? Go to http://www.childs.com/ and enter G117 to learn more about \"Preventing Falls: Care Instructions. \"  Current as of: May 4, 2022               Content Version: 13.5  © 9778-0152 Healthwise, [employer]. Care instructions adapted under license by [facility] ([facility]). If you have questions about a medical condition or this instruction, always ask your healthcare professional. [address] any warranty or liability for your use of this information. Hearing Loss: Care Instructions  Overview     Hearing loss is a sudden or slow decrease in how well you hear. It can range from mild to severe. Permanent hearing loss can occur with aging. It also can happen when you are exposed long-term to loud noise. Examples include listening to loud music, riding motorcycles, or being around other loud machines. Hearing loss can affect your work and home life. It can make you feel lonely or depressed. You may feel that you have lost your independence. But hearing aids and other devices can help you hear better and feel connected to others. Follow-up care is a key part of your treatment and safety. Be sure to make and go to all appointments, and call your doctor if you are having problems. It's also a good idea to know your test results and keep a list of the medicines you take. How can you care for yourself at home? Avoid loud noises whenever possible. This helps keep your hearing from getting worse. Always wear hearing protection around loud noises. Wear a hearing aid as directed. See a professional who can help you pick a hearing aid that fits you. Have hearing tests as your doctor suggests. They can show whether your hearing has changed. Your hearing aid may need to be adjusted. Use other devices as needed. These may include:  Telephone amplifiers and hearing aids that can connect to a television, stereo, radio, or microphone. Devices that use lights or vibrations. These alert you to the doorbell, a ringing telephone, or a baby monitor. Television closed-captioning. This shows the words at the bottom of the screen. Most new TVs can do this. TTY (text telephone). This lets you type messages back and forth on the telephone instead of talking or listening. These devices are also called TDD. When messages are typed on the keyboard, they are sent over the phone line to a receiving TTY. The message is shown on a monitor. Use text messaging, social media, and email if it is hard for you to communicate by telephone. Try to learn a listening technique called speechreading. It is not lipreading. You pay attention to people's gestures, expressions, posture, and tone of voice. These clues can help you understand what a person is saying. Face the person you are talking to, and have them face you. Make sure the lighting is good. You need to see the other person's face clearly. Think about counseling if you need help to adjust to your hearing loss. When should you call for help? Watch closely for changes in your health, and be sure to contact your doctor if:    You think your hearing is getting worse.     You have new symptoms, such as dizziness or nausea. Where can you learn more? Go to http://www.[employer].com/ and enter R798 to learn more about \"Hearing Loss: Care Instructions. \"  Current as of: May 4, 2022               Content Version: 13.5  © 2877-6931 Healthwise, Incorporated. Care instructions adapted under license by [facility] ([facility]).  If you have questions about a medical condition or this instruction, always ask your healthcare professional. Sarai Ash any warranty or liability for your use of this information. Learning About Vision Tests  What are vision tests? The four most common vision tests are visual acuity tests, refraction, visual field tests, and color vision tests. Visual acuity (sharpness) tests  These tests are used: To see if you need glasses or contact lenses. To monitor an eye problem. To check an eye injury. Visual acuity tests are done as part of routine exams. You may also have this test when you get your 's license or apply for some types of jobs. Visual field tests  These tests are used: To check for vision loss in any area of your range of vision. To screen for certain eye diseases. To look for nerve damage after a stroke, head injury, or other problem that could reduce blood flow to the brain. Refraction and color tests  A refraction test is done to find the right prescription for glasses and contact lenses. A color vision test is done to check for color blindness. Color vision is often tested as part of a routine exam. You may also have this test when you apply for a job where recognizing different colors is important, such as , electronics, or the [employer]. How are vision tests done? Visual acuity test   You cover one eye at a time. You read aloud from a wall chart across the room. You read aloud from a small card that you hold in your hand. Refraction   You look into a special device. The device puts lenses of different strengths in front of each eye to see how strong your glasses or contact lenses need to be. Visual field tests   Your doctor may have you look through special machines. Or your doctor may simply have you stare straight ahead while they move a finger into and out of your field of vision. Color vision test   You look at pieces of printed test patterns in various colors. You say what number or symbol you see.   Your doctor may have you trace the number or symbol using a pointer. How do these tests feel? There is very little chance of having a problem from this test. If dilating drops are used for a vision test, they may make the eyes sting and cause a medicine taste in the mouth. Follow-up care is a key part of your treatment and safety. Be sure to make and go to all appointments, and call your doctor if you are having problems. It's also a good idea to know your test results and keep a list of the medicines you take. Where can you learn more? Go to http://www.childs.com/ and enter G551 to learn more about \"Learning About Vision Tests. \"  Current as of: October 12, 2022               Content Version: 13.5  © 2006-2022 [employer]. Care instructions adapted under license by [facility] ([facility]). If you have questions about a medical condition or this instruction, always ask your healthcare professional. [place] [address] any warranty or liability for your use of this information. Advance Directives: Care Instructions  Overview  An advance directive is a legal way to state your wishes at the end of your life. It tells your family and your doctor what to do if you can't say what you want. There are two main types of advance directives. You can change them any time your wishes change. Living will. This form tells your family and your doctor your wishes about life support and other treatment. The form is also called a declaration. Medical power of . This form lets you name a person to make treatment decisions for you when you can't speak for yourself. This person is called a health care agent (health care proxy, health care surrogate). The form is also called a durable power of  for health care. If you do not have an advance directive, decisions about your medical care may be made by a family member, or by a doctor or a  who doesn't know you.   It may help to think of an advance directive as a gift to the people who care for you. If you have one, they won't have to make tough decisions by themselves. For more information, including forms for your state, see the [address] website (www.caringinfo.org/planning/advance-directives/). Follow-up care is a key part of your treatment and safety. Be sure to make and go to all appointments, and call your doctor if you are having problems. It's also a good idea to know your test results and keep a list of the medicines you take. What should you include in an advance directive? Many states have a unique advance directive form. (It may ask you to address specific issues.) Or you might use a universal form that's approved by many states. If your form doesn't tell you what to address, it may be hard to know what to include in your advance directive. Use the questions below to help you get started. Who do you want to make decisions about your medical care if you are not able to? What life-support measures do you want if you have a serious illness that gets worse over time or can't be cured? What are you most afraid of that might happen? (Maybe you're afraid of having pain, losing your independence, or being kept alive by machines.)  Where would you prefer to die? (Your home? A hospital? A nursing home?)  Do you want to donate your organs when you die? Do you want certain [employer]ism practices performed before you die? When should you call for help? Be sure to contact your doctor if you have any questions. Where can you learn more? Go to http://www.childs.com/ and enter R264 to learn more about \"Advance Directives: Care Instructions. \"  Current as of: June 16, 2022               Content Version: 13.5  © 5385-7605 Healthwise, Incorporated. Care instructions adapted under license by [facility] ([facility]).  If you have questions about a medical condition or this instruction, always ask your healthcare professional. Tania Acosta disclaims any warranty or liability for your use of this information. Learning About Healthy Weight  What is a healthy weight? A healthy weight is the weight at which you feel good about yourself and have energy for work and play. It's also one that lowers your risk for health problems. What can you do to stay at a healthy weight? It can be hard to stay at a healthy weight, especially when fast food, vending-machine snacks, and processed foods are so easy to find. And with your busy lifestyle, activity may be low on your list of things to do. But staying at a healthy weight may be easier than you think. Here are some dos and don'ts for staying at a healthy weight. Do eat healthy foods  The kinds of foods you eat have a big impact on both your weight and your health. Reaching and staying at a healthy weight is not about going on a diet. It's about making healthier food choices every day and changing your diet for good. Healthy eating means eating a variety of foods so that you get all the nutrients you need. Your body needs protein, carbohydrate, and fats for energy. They keep your heart beating, your brain active, and your muscles working. On most days, try to eat from each food group. This means eating a variety of: Whole grains, such as whole wheat breads and pastas. Fruits and vegetables. Dairy products, such as low-fat milk, yogurt, and cheese. Lean proteins, such as all types of fish, chicken without the skin, and beans. Don't have too much or too little of one thing. All foods, if eaten in moderation, can be part of healthy eating. Even sweets can be okay. If your favorite foods are high in fat, salt, sugar, or calories, limit how often you eat them. Eat smaller servings, or look for healthy substitutes. Do watch what you eat  Many people eat more than their bodies need. Part of staying at a healthy weight means learning how much food you really need from day to day and not eating more than that.  Even with healthy foods, eating too much can make you gain weight. Having a well-balanced diet means that you eat enough, but not too much, and that your food gives you the nutrients you need to stay healthy. So listen to your body. Eat when you're hungry. Stop when you feel satisfied. It's a good idea to have healthy snacks ready for when you get hungry. Keep healthy snacks with you at work, in your car, and at home. If you have a healthy snack easily available, you'll be less likely to pick a candy bar or bag of chips from a vending machine instead. Some healthy snacks you might want to keep on hand are fruit, low-fat yogurt, string cheese, low-fat microwave popcorn, raisins and other dried fruit, nuts, whole wheat crackers, pretzels, carrots, celery sticks, and broccoli. Do some physical activity  A big part of reaching and staying at a healthy weight is being active. When you're active, you burn calories. This makes it easier to reach and stay at a healthy weight. When you're active on a regular basis, your body burns more calories, even when you're at rest. Being active helps you lose fat and build lean muscle. Try to be active for at least 1 hour every day. This may sound like a lot, but it's okay to be active in smaller blocks of time that add up to 1 hour a day. Any activity that makes your heart beat faster and keeps it there for a while counts. A brisk walk, run, or swim will get your heart beating faster. So will climbing stairs, shooting baskets, or cycling. Even some household chores like vacuuming and mowing the lawn will get your heart rate up. Pick activities that you enjoy--ones that make your heart beat faster, your muscles stronger, and your muscles and joints more flexible. If you find more than one thing you like doing, do them all. You don't have to do the same thing every day. Don't diet  Diets don't work. Diets are temporary.  Because you give up so much when you diet, you may be hungry and think about food all the time. And after you stop dieting, you also may overeat to make up for what you missed. Most people who diet end up gaining back the pounds they lost--and more. Remember that healthy bodies come in lots of shapes and sizes. Everyone can get healthier by eating better and being more active. Where can you learn more? Go to http://www.woods.com/ and enter B909 to learn more about \"Learning About Healthy Weight. \"  Current as of: August 25, 2022               Content Version: 13.5  © 0452-2061 Healthwise, [employer]. Care instructions adapted under license by [facility] ([facility]). If you have questions about a medical condition or this instruction, always ask your healthcare professional. [address] any warranty or liability for your use of this information. Eating Healthy Foods: Care Instructions  Your Care Instructions     Eating healthy foods can help lower your risk for disease. Healthy food gives you energy and keeps your heart strong, your brain active, your muscles working, and your bones strong. A healthy diet includes a variety of foods from the basic food groups: grains, vegetables, fruits, milk and milk products, and meat and beans. Some people may eat more of their favorite foods from only one food group and, as a result, miss getting the nutrients they need. So, it is important to pay attention not only to what you eat but also to what you are missing from your diet. You can eat a healthy, balanced diet by making a few small changes. Follow-up care is a key part of your treatment and safety. Be sure to make and go to all appointments, and call your doctor if you are having problems. It's also a good idea to know your test results and keep a list of the medicines you take. How can you care for yourself at home? Look at what you eat  Keep a food diary for a week or two and record everything you eat or drink.  Track the number of servings you eat from each food group. For a balanced diet every day, eat a variety of:  6 or more ounce-equivalents of grains, such as cereals, breads, crackers, rice, or pasta, every day. An ounce-equivalent is 1 slice of bread, 1 cup of ready-to-eat cereal, or ½ cup of cooked rice, cooked pasta, or cooked cereal.  2½ cups of vegetables, especially:  Dark-green vegetables such as broccoli and spinach. Orange vegetables such as carrots and sweet potatoes. Dry beans (such as aranda and kidney beans) and peas (such as lentils). 2 cups of fresh, frozen, or canned fruit. A small apple or 1 banana or orange equals 1 cup.  3 cups of nonfat or low-fat milk, yogurt, or other milk products. 5½ ounces of meat and beans, such as chicken, fish, lean meat, beans, nuts, and seeds. One egg, 1 tablespoon of peanut butter, ½ ounce nuts or seeds, or ¼ cup of cooked beans equals 1 ounce of meat. Learn how to read food labels for serving sizes and ingredients. Fast-food and convenience-food meals often contain few or no fruits or vegetables. Make sure you eat some fruits and vegetables to make the meal more nutritious. Look at your food diary. For each food group, add up what you have eaten and then divide the total by the number of days. This will give you an idea of how much you are eating from each food group. See if you can find some ways to change your diet to make it more healthy. Start small  Do not try to make dramatic changes to your diet all at once. You might feel that you are missing out on your favorite foods and then be more likely to fail. Start slowly, and gradually change your habits. Try some of the following:  Use whole wheat bread instead of white bread. Use nonfat or low-fat milk instead of whole milk. Eat brown rice instead of white rice, and eat whole wheat pasta instead of white-flour pasta. Try low-fat cheeses and low-fat yogurt.   Add more fruits and vegetables to meals and have them for snacks. Add lettuce, tomato, cucumber, and onion to sandwiches. Add fruit to yogurt and cereal.  Enjoy food  You can still eat your favorite foods. You just may need to eat less of them. If your favorite foods are high in fat, salt, and sugar, limit how often you eat them, but do not cut them out entirely. Eat a wide variety of foods. Make healthy choices when eating out  The type of restaurant you choose can help you make healthy choices. Even fast-food chains are now offering more low-fat or healthier choices on the menu. Choose smaller portions, or take half of your meal home. When eating out, try:  A veggie pizza with a whole wheat crust or grilled chicken (instead of sausage or pepperoni). Pasta with roasted vegetables, grilled chicken, or marinara sauce instead of cream sauce. A vegetable wrap or grilled chicken wrap. Broiled or poached food instead of fried or breaded items. Make healthy choices easy  Buy packaged, prewashed, ready-to-eat fresh vegetables and fruits, such as baby carrots, salad mixes, and chopped or shredded broccoli and cauliflower. Buy packaged, presliced fruits, such as melon or pineapple. Choose 100% fruit or vegetable juice instead of soda. Limit juice intake to 4 to 6 oz (½ to ¾ cup) a day. Blend low-fat yogurt, fruit juice, and canned or frozen fruit to make a smoothie for breakfast or a snack. Where can you learn more? Go to http://www.woods.com/ and enter T756 to learn more about \"Eating Healthy Foods: Care Instructions. \"  Current as of: May 9, 2022               Content Version: 13.5  © 6700-2626 Healthwise, [employer]. Care instructions adapted under license by [facility] ([facility]). If you have questions about a medical condition or this instruction, always ask your healthcare professional. [address] any warranty or liability for your use of this information.       Personalized Preventive Plan for Wilhemenia Duff - 12/22/2022  Medicare offers a range of preventive health benefits. Some of the tests and screenings are paid in full while other may be subject to a deductible, co-insurance, and/or copay. Some of these benefits include a comprehensive review of your medical history including lifestyle, illnesses that may run in your family, and various assessments and screenings as appropriate. After reviewing your medical record and screening and assessments performed today your provider may have ordered immunizations, labs, imaging, and/or referrals for you. A list of these orders (if applicable) as well as your Preventive Care list are included within your After Visit Summary for your review. Other Preventive Recommendations:    A preventive eye exam performed by an eye specialist is recommended every 1-2 years to screen for glaucoma; cataracts, macular degeneration, and other eye disorders. A preventive dental visit is recommended every 6 months. Try to get at least 150 minutes of exercise per week or 10,000 steps per day on a pedometer . Order or download the FREE \"Exercise & Physical Activity: Your Everyday Guide\" from [employer]. Call 4-671.360.2584 or search [employer] online. You need 5700-1333 mg of calcium and 4188-5377 IU of vitamin D per day. It is possible to meet your calcium requirement with diet alone, but a vitamin D supplement is usually necessary to meet this goal.  When exposed to the sun, use a sunscreen that protects against both UVA and UVB radiation with an SPF of 30 or greater. Reapply every 2 to 3 hours or after sweating, drying off with a towel, or swimming. Always wear a seat belt when traveling in a car. Always wear a helmet when riding a bicycle or motorcycle.

## 2022-12-22 NOTE — ASSESSMENT & PLAN NOTE
Fairly controlled  -Continue olmesartan with HCTZ 40/12.5 mg, diltiazem 120 mg, metoprolol 100 mg  -will try to increase physical activity and lower salt in diet

## 2022-12-22 NOTE — PROGRESS NOTES
Medicare Annual Wellness Visit    Maria Luz Duff is here for Medicare AWV    Assessment & Plan   Medicare annual wellness visit, subsequent  Type 2 diabetes mellitus without complication, without long-term current use of insulin ([address]  Assessment & Plan:  Overall doing well from a clinical standpoint. Well controlled, last a1c 6.1% 12/22. No known DM complications.  -Continue metformin 1000 mg twice daily  - BP fairly controlled. On ARB  - Reviewed lipids 6/22, continue Lipitor 40 mg  Lab Results   Component Value Date    LDLCALC 74 06/16/2022    CREATININE 0.6 12/15/2022   -no microalbuminuria 6/22  - Up-to-date with PSV23 vaccine , flu shot. Foot pain, left  -     Gladis Roberts MD, Orthopedic Surgery (Foot, Ankle), [place]  Essential hypertension  Assessment & Plan:  Fairly controlled  -Continue olmesartan with HCTZ 40/12.5 mg, diltiazem 120 mg, metoprolol 100 mg  -will try to increase physical activity and lower salt in diet    Recommendations for Preventive Services Due: see orders and patient instructions/AVS.  Recommended screening schedule for the next 5-10 years is provided to the patient in written form: see Patient Instructions/AVS.     Return for Medicare Annual Wellness Visit in 1 year. Subjective   The following acute and/or chronic problems were also addressed today:  As above    Patient's complete Health Risk Assessment and screening values have been reviewed and are found in Flowsheets. The following problems were reviewed today and where indicated follow up appointments were made and/or referrals ordered. Positive Risk Factor Screenings with Interventions:    Fall Risk:  Do you feel unsteady or are you worried about falling? : (!) yes  2 or more falls in past year?: no  Fall with injury in past year?: no     Interventions:     Foot surgeon   See AVS for additional education material  See A/P for plan and any pertinent orders              Weight and Activity:  Physical Activity: Insufficiently Active    Days of Exercise per Week: 2 days    Minutes of Exercise per Session: 30 min     On average, how many days per week do you engage in moderate to strenuous exercise (like a brisk walk)?: 2 days  Have you lost any weight without trying in the past 3 months?: No     Obesity Interventions:  See AVS for additional education material  See A/P for plan and any pertinent orders           Hearing Screen:  Do you or your family notice any trouble with your hearing that hasn't been managed with hearing aids?: (!) Yes    Interventions:  Already scheduled for hearing test     Vision Screen:  Do you have difficulty driving, watching TV, or doing any of your daily activities because of your eyesight?: (!) Yes  Have you had an eye exam within the past year?: Yes  No results found. Interventions:    Sees ophto regularly                       Objective   Vitals:    12/22/22 0955   BP: 138/80   Pulse: 65   Temp: 97.9 °F (36.6 °C)   SpO2: 99%   Weight: 254 lb 6.4 oz (115.4 kg)      Body mass index is 37.57 kg/m². Physical Exam  Vitals reviewed. Constitutional:       General: She is not in acute distress. Appearance: Normal appearance. She is well-developed. She is not ill-appearing, toxic-appearing or diaphoretic. HENT:      Head: Normocephalic and atraumatic. Eyes:      General: No scleral icterus. Conjunctiva/sclera: Conjunctivae normal.      Pupils: Pupils are equal, round, and reactive to light. Cardiovascular:      Rate and Rhythm: Normal rate and regular rhythm. Heart sounds: Normal heart sounds. Pulmonary:      Effort: Pulmonary effort is normal. No respiratory distress. Breath sounds: Normal breath sounds. Abdominal:      General: There is no distension. Palpations: Abdomen is soft. Tenderness: There is no abdominal tenderness. Musculoskeletal:      Cervical back: Normal range of motion and neck supple.       Right lower leg: Edema (trace) present. Left lower leg: Edema (trace) present. Skin:     General: Skin is warm and dry. Coloration: Skin is not jaundiced. Neurological:      Mental Status: She is alert and oriented to person, place, and time. Psychiatric:         Behavior: Behavior normal.          Allergies   Allergen Reactions    Gluten Meal Other (See Comments)     Wheat-Causes phlegm     Other      Dogs-pt unknown reaction     Eggs Or Egg-Derived Products      Gets egg free immunizations   Patient able to eat eggs     Prior to Visit Medications    Medication Sig Taking?  Authorizing Provider   meloxicam (MOBIC) 15 MG tablet Take 1 tablet by mouth daily Yes Netta Tatum MD   olmesartan-hydroCHLOROthiazide (BENICAR HCT) 40-12.5 MG per tablet TAKE 1 TABLET DAILY Yes Netta Tatum MD   metFORMIN (GLUCOPHAGE-XR) 500 MG extended release tablet Take 2 tablets by mouth 2 times daily (with meals) TAKE TWO (2) TWICE DAILY Yes Netta Tatum MD   dilTIAZem (CARDIZEM CD) 120 MG extended release capsule Take 1 capsule by mouth daily Yes Netta Tatum MD   buPROPion (WELLBUTRIN XL) 150 MG extended release tablet TAKE 1 Zurdo Livingston MD   omeprazole (PRILOSEC) 40 MG delayed release capsule Take 1 capsule by mouth daily Yes Netta Tatum MD   atorvastatin (LIPITOR) 40 MG tablet Take 1 tablet by mouth nightly TAKE 1 Socorro Horan MD   metoprolol succinate (TOPROL XL) 100 MG extended release tablet TAKE 1 TABLET EVERY DAY Yes Candace Kamara MD   levothyroxine (SYNTHROID) 25 MCG tablet Take 1 tablet by mouth Daily Yes Candace Kamara MD   Cholecalciferol (VITAMIN D3) 50 MCG (2000 UT) CAPS Take by mouth Yes Historical Provider, MD   Ascorbic Acid (VITAMIN C) 1000 MG tablet Take 1,000 mg by mouth daily Yes Historical Provider, MD   Multiple Minerals (CALCIUM/MAGNESIUM/ZINC PO) Take by mouth Yes Historical Provider, MD   metroNIDAZOLE (METROCREAM) 0.75 % cream Daily Yes Historical Provider, MD   Multiple Vitamins-Minerals (ICAPS AREDS 2 PO) Take by mouth  Patient not taking: Reported on 12/22/2022  Historical Provider, MD   Omega-3 Fatty Acids (FISH OIL) 1000 MG CAPS Take 2 capsules by mouth 2 times daily  Patient not taking: No sig reported  Jackeline Tirado MD       [facility] [place] (Including outside providers/suppliers regularly involved in providing care):   Patient Care Team:  Renetta Ellsworth MD as PCP - General (Internal Medicine)  Renetta Ellsworth MD as PCP - REHABILITATION [facility] Empaneled Provider  Qasim Newman (Psychology)     Reviewed and updated this visit:  Tobacco  Allergies  Meds  Problems  Med Hx  Surg Hx  Soc Hx  Fam Hx

## 2023-01-06 NOTE — TELEPHONE ENCOUNTER
Refill    New prescription needed     levothyroxine (SYNTHROID) 25 MCG tablet         metoprolol succinate (TOPROL XL) 100 MG extended release tablet           [facility] - [place], [place] [phone] - [facility] [place] [phone]

## 2023-01-09 RX ORDER — LEVOTHYROXINE SODIUM 0.03 MG/1
25 TABLET ORAL DAILY
Qty: 90 TABLET | Refills: 3 | Status: SHIPPED | OUTPATIENT
Start: 2023-01-09

## 2023-01-09 RX ORDER — METOPROLOL SUCCINATE 100 MG/1
TABLET, EXTENDED RELEASE ORAL
Qty: 90 TABLET | Refills: 3 | Status: SHIPPED | OUTPATIENT
Start: 2023-01-09

## 2023-01-16 SDOH — HEALTH STABILITY: PHYSICAL HEALTH: ON AVERAGE, HOW MANY DAYS PER WEEK DO YOU ENGAGE IN MODERATE TO STRENUOUS EXERCISE (LIKE A BRISK WALK)?: 3 DAYS

## 2023-01-16 SDOH — HEALTH STABILITY: PHYSICAL HEALTH: ON AVERAGE, HOW MANY MINUTES DO YOU ENGAGE IN EXERCISE AT THIS LEVEL?: 110 MIN

## 2023-01-19 ENCOUNTER — OFFICE VISIT (OUTPATIENT)
Dept: ORTHOPEDIC SURGERY | Age: 73
End: 2023-01-19

## 2023-01-19 VITALS — WEIGHT: 254 LBS | BODY MASS INDEX: 37.62 KG/M2 | HEIGHT: 69 IN

## 2023-01-19 DIAGNOSIS — M19.079 ARTHRITIS OF MIDFOOT: Primary | ICD-10-CM

## 2023-01-19 NOTE — PROGRESS NOTES
[facility]  Outpatient Progress Note  Varun Murillo MD    Patient Name: Hamilton Freitas MRN: [id number]   Age: 67 y.o. YOB: 1950   Sex: female      [address] Complaint   Patient presents with    Foot Pain     NP Left foot/ankle  Pain history of OA foot  No recent imaging        HISTORY OF PRESENT ILLNESS   Hamilton Freitas is a 67 y.o. female Patient is new today and being seen for left foot pain. It began in 2014. No specific injury occurred, but she does believe the heel has collapsed. She also has a history of high arches and midfoot arthritis. Patient's pain today is 5/10. Her pain increases with standing and being barefoot. She describes the pain as sharp. Her attempted methods of relief include elevation, custom orthotics that are five years old, and taking Mobic for other health issues. New xrays are being obtained today. She has had bilateral shoulder replacements, she has a bilateral hip replacement and she has bilateral knee replacements. She apparently had done well until about 2010 when she started to develop significant arthritis in multiple joints. At one time she was diagnosed with lupus and was on methotrexate for a time, but subsequently was told she didn't have lupus or rheumatoid arthritis and she does not take the medications. She is a type II diabetic for the last 13 years, she takes metformin, and she is not quite sure how well controlled she has. Because of left foot pain and her history of multiple joint arthritis and replacements, she is very inactive. She has too much pain in the left foot to even walk more than she has to. She saw Dr. Yue Soto in 2019 and was prescribed new orthotic inserts. She manages relatively well with accommodative shoe wear and her orthotics although her pain limits her ability to walk distances.     Pain Assessment  Location of Pain: Foot  Location Modifiers: Left  Severity of Pain: 5  Quality of Pain: Dull, Aching (burning in heel)  Duration of Pain: A few minutes  Frequency of Pain: Intermittent  Aggravating Factors: Standing, Walking, Exercise  Relieving Factors: Rest, Ice, Nsaids  PAST MEDICAL HISTORY      Past Medical History:   Diagnosis Date    Arthritis     shoulders hips knees    Dental crowns present     Diabetes mellitus [address] [address]     Hyperlipidemia     Hypertension     THERESA on CPAP     Pericarditis 9/25/2018    Onset 2008 w 2 hospitalizations Treated w medrol prn for flares     Thyroid disease        PAST SURGICAL HISTORY     Past Surgical History:   Procedure Laterality Date    BREAST BIOPSY Right     years ago; benign    COLONOSCOPY      ENDOSCOPY, COLON, DIAGNOSTIC      EYE SURGERY Bilateral     cataracts    HYSTERECTOMY (CERVIX STATUS UNKNOWN)      JOINT REPLACEMENT Left 2014    JOINT REPLACEMENT Bilateral 2016    knees    SHOULDER ARTHROPLASTY Bilateral 2013/2018    GONSALO AND BSO (CERVIX REMOVED)  2001    TOTAL HIP ARTHROPLASTY Right 3/30/2021    DIRECT ANTERIOR APPROACH RIGHT TOTAL HIP ARTHROPLASTY performed by Janes Henley MD at [facility]     Current Outpatient Medications   Medication Sig Dispense Refill    levothyroxine (SYNTHROID) 25 MCG tablet Take 1 tablet by mouth Daily 90 tablet 3    metoprolol succinate (TOPROL XL) 100 MG extended release tablet TAKE 1 TABLET EVERY DAY 90 tablet 3    meloxicam (MOBIC) 15 MG tablet Take 1 tablet by mouth daily 90 tablet 0    olmesartan-hydroCHLOROthiazide (BENICAR HCT) 40-12.5 MG per tablet TAKE 1 TABLET DAILY 90 tablet 1    metFORMIN (GLUCOPHAGE-XR) 500 MG extended release tablet Take 2 tablets by mouth 2 times daily (with meals) TAKE TWO (2) TWICE DAILY 360 tablet 1    dilTIAZem (CARDIZEM CD) 120 MG extended release capsule Take 1 capsule by mouth daily 90 capsule 1    buPROPion (WELLBUTRIN XL) 150 MG extended release tablet TAKE 1 TABLET EVERY MORNING 90 tablet 1    omeprazole (PRILOSEC) 40 MG delayed release capsule Take 1 capsule by mouth daily 90 capsule 1    atorvastatin (LIPITOR) 40 MG tablet Take 1 tablet by mouth nightly TAKE 1 TABLET EVERY DAY 90 tablet 1    Multiple Vitamins-Minerals (ICAPS AREDS 2 PO) Take by mouth      Cholecalciferol (VITAMIN D3) 50 MCG (2000 UT) CAPS Take by mouth      Ascorbic Acid (VITAMIN C) 1000 MG tablet Take 1,000 mg by mouth daily      Multiple Minerals (CALCIUM/MAGNESIUM/ZINC PO) Take by mouth      metroNIDAZOLE (METROCREAM) 0.75 % cream Daily      Omega-3 Fatty Acids (FISH OIL) 1000 MG CAPS Take 2 capsules by mouth 2 times daily (Patient not taking: No sig reported) 90 capsule 3     No current facility-administered medications for this visit. ALLERGIES     Allergies   Allergen Reactions    Gluten Meal Other (See Comments)     Wheat-Causes phlegm     Other      Dogs-pt unknown reaction     Eggs Or Egg-Derived Products      Gets egg free immunizations   Patient able to eat eggs       FAMILY HISTORY     Family History   Problem Relation Age of Onset    Ovarian Cancer Maternal Aunt     Breast Cancer Paternal Aunt        SOCIAL HISTORY     Social History     Socioeconomic History    Marital status:    Tobacco Use    Smoking status: Never    Smokeless tobacco: Never   Vaping Use    Vaping Use: Never used   Substance and Sexual Activity    Alcohol use: Yes     Alcohol/week: 2.0 standard drinks     Types: 1 Glasses of wine, 1 Shots of liquor per week     Comment: 2-3 times a week      Social Determinants of Health     Financial Resource Strain: Low Risk     Difficulty of Paying Living Expenses: Not hard at all   Food Insecurity: No Food Insecurity    Worried About Running Out of Food in the Last Year: Never true    Ran Out of Food in the Last Year: Never true   Physical Activity: Sufficiently Active    Days of Exercise per Week: 3 days    Minutes of Exercise per Session: 110 min   Intimate Partner Violence: Not At Risk    Fear of Current or Ex-Partner: No    Emotionally Abused: No    Physically Abused:  No Sexually Abused: No       REVIEW OF SYSTEMS   General: no fever, chills, night sweats, anorexia, malaise, fatigue, or weight change  Hematologic:  no unexplained bleeding or bruising  HEENT:   no nasal congestion, rhinorrhea, sore throat, or facial pain  Respiratory:  no cough, dyspnea, or chest pain  Cardiovascular:  no angina, BEE, PND, orthopnea, dependent edema, or palpitations  Gastrointestinal:  no nausea, vomiting, diarrhea, constipation, or abdominal pain  Genitourinary:  no urinary urgency, frequency, dysuria, or hematuria  Musculoskeletal: see HPI  Endocrine:  no heat or cold intolerance and no polyphagia, polydipsia, or polyuria  Skin:  no skin eruptions or changing lesions  Neurologic:  no focal weakness, numbness/tingling, tremor, or severe headache. See HPI. See HPI for pertinent positives. PHYSICAL EXAM   Vital Signs:   Vitals:    01/19/23 1005   Weight: 254 lb (115.2 kg)   Height: 5' 9\" (1.753 m)       General appearance: healthy, alert, no distress  Skin: Skin color, texture, turgor normal. No rashes or lesions  HEENT: atraumatic, normocephalic. PERRL  Respiratory: Unlabored breathing  Lymphatic: No adenopathy   Neuro: Alert and oriented, normal distal sensation, normal bilateral DTRs  Vascular: Normal distal capillary and distal pulses  Muskuloskeletal Exam:     Bilateral feet have no swelling or edema, no erythema or warmth. There are visible and palpable arthritic spurs in the dorsal and medial midfoot bilaterally left greater than right. Ankle range of motion is full. There has been medial shift of the plantar fat pad under the heel on the left. Weightbearing alignment of the ankle is neutral with mild hindfoot varus. Her longitudinal arch is well-maintained and is relatively high. There is moderate left and mild right forefoot abduction through the midfoot. There are no lesser toe deformities. No skin lesions or callosities. She has normal pulses. Normal sensation.   Normal motor strength. RADIOLOGY   X-rays obtained and reviewed in office:  Views bilateral feet and ankle standing 3 views    Impression: Hindfoot varus and severe degenerative changes in the midfoot left greater than right with abduction through the TMT joints. Longitudinal and transverse arch maintained. IMPRESSION     1. Arthritis of midfoot         PLAN   I had a lengthy discussion with patient today regarding diagnosis and treatment options and recommendations. We discussed surgical and nonsurgical treatment options for her condition. The patient has severe degenerative arthritis of the bilateral mid feet left greater than right but is fairly well compensated with accommodative shoe wear and orthotic inserts. If she can continue with a well-maintained quality of life just with shoewear modification and arch supports as well as her nonsteroidal anti-inflammatory medications and over-the-counter analgesics this would be my recommendation. Should pain symptoms severely increased limiting her activities of daily living or quality of life then I believe the only surgical option would be extensive midfoot arthrodesis. We discussed risks benefits complications possible outcomes and expected postoperative course. Today we have just discussed shoewear modification and I have prescribed a new pair of custom orthotics for her. I have recommended she use topical diclofenac and over-the-counter acetaminophen in addition to her Mobic. FOLLOWUP     Return if symptoms worsen or fail to improve. Orders Placed This Encounter   Procedures    XR FOOT LEFT (MIN 3 VIEWS)     Standing Status:   Future     Number of Occurrences:   1     Standing Expiration Date:   12/30/2023    XR ANKLE LEFT (MIN 3 VIEWS)     Standing Status:   Future     Number of Occurrences:   1     Standing Expiration Date:   1/19/2024      No orders of the defined types were placed in this encounter.       Patient was instructed on appropriate use of braces, participation in home exercise programs, healthy lifestyle choices and weight loss as appropriate     Fawn Ortiz MD

## 2023-01-30 RX ORDER — MELOXICAM 15 MG/1
TABLET ORAL
Qty: 90 TABLET | Refills: 0 | Status: SHIPPED | OUTPATIENT
Start: 2023-01-30

## 2023-04-17 DIAGNOSIS — E78.00 PURE HYPERCHOLESTEROLEMIA: ICD-10-CM

## 2023-04-18 RX ORDER — ATORVASTATIN CALCIUM 40 MG/1
TABLET, FILM COATED ORAL
Qty: 90 TABLET | Refills: 1 | Status: SHIPPED | OUTPATIENT
Start: 2023-04-18

## 2023-04-20 RX ORDER — BUPROPION HYDROCHLORIDE 150 MG/1
TABLET ORAL
Qty: 90 TABLET | Refills: 1 | Status: SHIPPED | OUTPATIENT
Start: 2023-04-20

## 2023-04-20 RX ORDER — OMEPRAZOLE 40 MG/1
CAPSULE, DELAYED RELEASE ORAL
Qty: 90 CAPSULE | Refills: 1 | Status: SHIPPED | OUTPATIENT
Start: 2023-04-20

## 2023-05-03 RX ORDER — OLMESARTAN MEDOXOMIL AND HYDROCHLOROTHIAZIDE 40/12.5 40; 12.5 MG/1; MG/1
TABLET ORAL
Qty: 90 TABLET | Refills: 1 | Status: SHIPPED | OUTPATIENT
Start: 2023-05-03

## 2023-05-03 RX ORDER — METFORMIN HYDROCHLORIDE 500 MG/1
TABLET, EXTENDED RELEASE ORAL
Qty: 360 TABLET | Refills: 1 | Status: SHIPPED | OUTPATIENT
Start: 2023-05-03

## 2023-06-30 DIAGNOSIS — E03.8 OTHER SPECIFIED HYPOTHYROIDISM: ICD-10-CM

## 2023-06-30 DIAGNOSIS — E11.9 TYPE 2 DIABETES MELLITUS WITHOUT COMPLICATION, WITHOUT LONG-TERM CURRENT USE OF INSULIN (HCC): ICD-10-CM

## 2023-06-30 DIAGNOSIS — E78.2 MIXED HYPERLIPIDEMIA: ICD-10-CM

## 2023-06-30 DIAGNOSIS — I10 ESSENTIAL HYPERTENSION: ICD-10-CM

## 2023-06-30 LAB
ALBUMIN SERPL-MCNC: 4.2 G/DL (ref 3.4–5)
ALBUMIN/GLOB SERPL: 1.8 {RATIO} (ref 1.1–2.2)
ALP SERPL-CCNC: 90 U/L (ref 40–129)
ALT SERPL-CCNC: 11 U/L (ref 10–40)
ANION GAP SERPL CALCULATED.3IONS-SCNC: 11 MMOL/L (ref 3–16)
AST SERPL-CCNC: 11 U/L (ref 15–37)
BASOPHILS # BLD: 0 K/UL (ref 0–0.2)
BASOPHILS NFR BLD: 0.5 %
BILIRUB SERPL-MCNC: 0.3 MG/DL (ref 0–1)
BUN SERPL-MCNC: 12 MG/DL (ref 7–20)
CALCIUM SERPL-MCNC: 9.7 MG/DL (ref 8.3–10.6)
CHLORIDE SERPL-SCNC: 100 MMOL/L (ref 99–110)
CHOLEST SERPL-MCNC: 160 MG/DL (ref 0–199)
CO2 SERPL-SCNC: 28 MMOL/L (ref 21–32)
CREAT SERPL-MCNC: 0.6 MG/DL (ref 0.6–1.2)
DEPRECATED RDW RBC AUTO: 14.8 % (ref 12.4–15.4)
EOSINOPHIL # BLD: 0.5 K/UL (ref 0–0.6)
EOSINOPHIL NFR BLD: 7.7 %
EST. AVERAGE GLUCOSE BLD GHB EST-MCNC: 137 MG/DL
GFR SERPLBLD CREATININE-BSD FMLA CKD-EPI: >60 ML/MIN/{1.73_M2}
GLUCOSE SERPL-MCNC: 133 MG/DL (ref 70–99)
HBA1C MFR BLD: 6.4 %
HCT VFR BLD AUTO: 37.7 % (ref 36–48)
HDLC SERPL-MCNC: 47 MG/DL (ref 40–60)
HGB BLD-MCNC: 12.7 G/DL (ref 12–16)
LDLC SERPL CALC-MCNC: 78 MG/DL
LYMPHOCYTES # BLD: 1.6 K/UL (ref 1–5.1)
LYMPHOCYTES NFR BLD: 24.6 %
MCH RBC QN AUTO: 29.6 PG (ref 26–34)
MCHC RBC AUTO-ENTMCNC: 33.6 G/DL (ref 31–36)
MCV RBC AUTO: 88 FL (ref 80–100)
MONOCYTES # BLD: 0.5 K/UL (ref 0–1.3)
MONOCYTES NFR BLD: 8 %
NEUTROPHILS # BLD: 3.8 K/UL (ref 1.7–7.7)
NEUTROPHILS NFR BLD: 59.2 %
PLATELET # BLD AUTO: 320 K/UL (ref 135–450)
PMV BLD AUTO: 7.9 FL (ref 5–10.5)
POTASSIUM SERPL-SCNC: 4.4 MMOL/L (ref 3.5–5.1)
PROT SERPL-MCNC: 6.6 G/DL (ref 6.4–8.2)
RBC # BLD AUTO: 4.28 M/UL (ref 4–5.2)
SODIUM SERPL-SCNC: 139 MMOL/L (ref 136–145)
TRIGL SERPL-MCNC: 175 MG/DL (ref 0–150)
TSH SERPL DL<=0.005 MIU/L-ACNC: 2.53 UIU/ML (ref 0.27–4.2)
VLDLC SERPL CALC-MCNC: 35 MG/DL
WBC # BLD AUTO: 6.5 K/UL (ref 4–11)

## 2023-07-03 SDOH — ECONOMIC STABILITY: FOOD INSECURITY: WITHIN THE PAST 12 MONTHS, YOU WORRIED THAT YOUR FOOD WOULD RUN OUT BEFORE YOU GOT MONEY TO BUY MORE.: NEVER TRUE

## 2023-07-03 SDOH — ECONOMIC STABILITY: FOOD INSECURITY: WITHIN THE PAST 12 MONTHS, THE FOOD YOU BOUGHT JUST DIDN'T LAST AND YOU DIDN'T HAVE MONEY TO GET MORE.: NEVER TRUE

## 2023-07-03 SDOH — ECONOMIC STABILITY: INCOME INSECURITY: HOW HARD IS IT FOR YOU TO PAY FOR THE VERY BASICS LIKE FOOD, HOUSING, MEDICAL CARE, AND HEATING?: NOT HARD AT ALL

## 2023-07-06 ENCOUNTER — TELEMEDICINE (OUTPATIENT)
Dept: INTERNAL MEDICINE CLINIC | Age: 73
End: 2023-07-06

## 2023-07-06 DIAGNOSIS — E66.01 SEVERE OBESITY (BMI 35.0-39.9) WITH COMORBIDITY (HCC): Primary | ICD-10-CM

## 2023-07-06 DIAGNOSIS — E03.8 OTHER SPECIFIED HYPOTHYROIDISM: ICD-10-CM

## 2023-07-06 DIAGNOSIS — E11.9 TYPE 2 DIABETES MELLITUS WITHOUT COMPLICATION, WITHOUT LONG-TERM CURRENT USE OF INSULIN (HCC): ICD-10-CM

## 2023-07-06 DIAGNOSIS — G47.33 OBSTRUCTIVE SLEEP APNEA SYNDROME: ICD-10-CM

## 2023-07-06 DIAGNOSIS — E78.2 MIXED HYPERLIPIDEMIA: ICD-10-CM

## 2023-07-06 DIAGNOSIS — Z86.010 PERSONAL HISTORY OF COLONIC POLYPS: ICD-10-CM

## 2023-07-06 DIAGNOSIS — F33.41 RECURRENT MAJOR DEPRESSIVE DISORDER, IN PARTIAL REMISSION (HCC): ICD-10-CM

## 2023-07-06 DIAGNOSIS — H91.93 BILATERAL HEARING LOSS, UNSPECIFIED HEARING LOSS TYPE: ICD-10-CM

## 2023-07-06 ASSESSMENT — PATIENT HEALTH QUESTIONNAIRE - PHQ9
9. THOUGHTS THAT YOU WOULD BE BETTER OFF DEAD, OR OF HURTING YOURSELF: 0
SUM OF ALL RESPONSES TO PHQ QUESTIONS 1-9: 0
SUM OF ALL RESPONSES TO PHQ9 QUESTIONS 1 & 2: 0
3. TROUBLE FALLING OR STAYING ASLEEP: 0
7. TROUBLE CONCENTRATING ON THINGS, SUCH AS READING THE NEWSPAPER OR WATCHING TELEVISION: 0
4. FEELING TIRED OR HAVING LITTLE ENERGY: 0
SUM OF ALL RESPONSES TO PHQ QUESTIONS 1-9: 0
2. FEELING DOWN, DEPRESSED OR HOPELESS: 0
1. LITTLE INTEREST OR PLEASURE IN DOING THINGS: 0
5. POOR APPETITE OR OVEREATING: 0
1. LITTLE INTEREST OR PLEASURE IN DOING THINGS: 0
SUM OF ALL RESPONSES TO PHQ QUESTIONS 1-9: 0
SUM OF ALL RESPONSES TO PHQ QUESTIONS 1-9: 0
10. IF YOU CHECKED OFF ANY PROBLEMS, HOW DIFFICULT HAVE THESE PROBLEMS MADE IT FOR YOU TO DO YOUR WORK, TAKE CARE OF THINGS AT HOME, OR GET ALONG WITH OTHER PEOPLE: 0
SUM OF ALL RESPONSES TO PHQ9 QUESTIONS 1 & 2: 0
2. FEELING DOWN, DEPRESSED OR HOPELESS: 0
8. MOVING OR SPEAKING SO SLOWLY THAT OTHER PEOPLE COULD HAVE NOTICED. OR THE OPPOSITE, BEING SO FIGETY OR RESTLESS THAT YOU HAVE BEEN MOVING AROUND A LOT MORE THAN USUAL: 0
SUM OF ALL RESPONSES TO PHQ QUESTIONS 1-9: 0
6. FEELING BAD ABOUT YOURSELF - OR THAT YOU ARE A FAILURE OR HAVE LET YOURSELF OR YOUR FAMILY DOWN: 0
SUM OF ALL RESPONSES TO PHQ QUESTIONS 1-9: 0

## 2023-07-06 NOTE — ASSESSMENT & PLAN NOTE
Overall doing well from a clinical standpoint. Well controlled, last a1c 6.4% 06/23. No known DM complications.  -Continue metformin 1000 mg twice daily  - BP fairly controlled. On ARB  - Reviewed lipids 06/23, continue Lipitor 40 mg  Lab Results   Component Value Date    LDLCALC 78 06/30/2023    CREATININE 0.6 06/30/2023   -no microalbuminuria 6/22  - Up-to-date with PSV23 vaccine , flu shot.

## 2023-07-06 NOTE — ASSESSMENT & PLAN NOTE
Lipids slightly above goal for diabetes  We decided to start with lifestyle modifications, low-fat diet-and increase physical activity and remain on current Lipitor dose

## 2023-07-06 NOTE — PROGRESS NOTES
Lalitha Alonzo (:  1950) is a Established patient, presenting virtually for evaluation of the following:    Assessment & Plan   Below is the assessment and plan developed based on review of pertinent history, physical exam, labs, studies, and medications. Type 2 diabetes mellitus without complication, without long-term current use of insulin ([address])  Overall doing well from a clinical standpoint. Well controlled, last a1c 6.4% . No known DM complications.  -Continue metformin 1000 mg twice daily  - BP fairly controlled. On ARB  - Reviewed lipids , continue Lipitor 40 mg  Lab Results   Component Value Date    LDLCALC 78 2023    CREATININE 0.6 2023   -no microalbuminuria   - Up-to-date with PSV23 vaccine , flu shot. Recurrent major depressive disorder, in partial remission ([address])  Fairly controlled, lost a good friend today  -Continue Wellbutrin 150 mg  -Continue therapist    Personal history of colonic polyps  Colonoscopy 3/31/2023, Dr. Escobar Bachelor 1 polyp, repeat in 4-5 years    Other specified hypothyroidism  Clinically and chemically euthyroid  -Continue Synthroid 25 mcg for now    Mixed hyperlipidemia  Lipids slightly above goal for diabetes  We decided to start with lifestyle modifications, low-fat diet-and increase physical activity and remain on current Lipitor dose    Obstructive sleep apnea syndrome  - Needs CPAP prescription, uses nightly    Hearing loss  - Using hearing aids but notes increased wax buildup, she will try Debrox, if not helping will come in for ear cleaning    Orders Placed This Encounter   Procedures    Basic Metabolic Panel    MICROALBUMIN / CREATININE URINE RATIO      No orders of the defined types were placed in this encounter.      Medications Discontinued During This Encounter   Medication Reason    Omega-3 Fatty Acids (FISH OIL) 1000 MG CAPS Therapy completed    Multiple Minerals (CALCIUM/MAGNESIUM/ZINC PO) Therapy completed    Cholecalciferol

## 2023-07-06 NOTE — ASSESSMENT & PLAN NOTE
- Using hearing aids but notes increased wax buildup, she will try Debrox, if not helping will come in for ear cleaning

## 2023-07-07 ENCOUNTER — TELEPHONE (OUTPATIENT)
Dept: INTERNAL MEDICINE CLINIC | Age: 73
End: 2023-07-07

## 2023-07-18 ENCOUNTER — HOSPITAL ENCOUNTER (OUTPATIENT)
Dept: MAMMOGRAPHY | Age: 73
Discharge: HOME | End: 2023-07-18
Payer: MEDICARE

## 2023-07-18 VITALS — WEIGHT: 254 LBS | BODY MASS INDEX: 37.62 KG/M2 | HEIGHT: 69 IN

## 2023-07-18 DIAGNOSIS — Z12.31 VISIT FOR SCREENING MAMMOGRAM: ICD-10-CM

## 2023-07-18 PROCEDURE — 77063 BREAST TOMOSYNTHESIS BI: CPT

## 2023-08-08 RX ORDER — DILTIAZEM HYDROCHLORIDE 120 MG/1
CAPSULE, COATED, EXTENDED RELEASE ORAL
Qty: 90 CAPSULE | Refills: 0 | Status: SHIPPED | OUTPATIENT
Start: 2023-08-08

## 2023-09-18 ENCOUNTER — TELEPHONE (OUTPATIENT)
Dept: INTERNAL MEDICINE CLINIC | Age: 73
End: 2023-09-18

## 2023-09-18 RX ORDER — LEVOTHYROXINE SODIUM 0.03 MG/1
25 TABLET ORAL DAILY
Qty: 90 TABLET | Refills: 1 | Status: SHIPPED | OUTPATIENT
Start: 2023-09-18

## 2023-09-18 RX ORDER — METOPROLOL SUCCINATE 100 MG/1
TABLET, EXTENDED RELEASE ORAL
Qty: 90 TABLET | Refills: 1 | Status: SHIPPED | OUTPATIENT
Start: 2023-09-18

## 2023-09-18 RX ORDER — MELOXICAM 15 MG/1
15 TABLET ORAL DAILY
Qty: 30 TABLET | Refills: 0 | Status: SHIPPED | OUTPATIENT
Start: 2023-09-18

## 2023-09-19 PROBLEM — E66.812 CLASS 2 SEVERE OBESITY DUE TO EXCESS CALORIES WITH SERIOUS COMORBIDITY AND BODY MASS INDEX (BMI) OF 38.0 TO 38.9 IN ADULT: Chronic | Status: ACTIVE | Noted: 2018-09-25

## 2023-09-19 PROBLEM — F33.41 RECURRENT MAJOR DEPRESSIVE DISORDER, IN PARTIAL REMISSION (HCC): Chronic | Status: ACTIVE | Noted: 2018-09-25

## 2023-09-19 PROBLEM — I10 ESSENTIAL HYPERTENSION: Chronic | Status: ACTIVE | Noted: 2018-11-03

## 2023-09-19 PROBLEM — E11.9 TYPE 2 DIABETES MELLITUS WITHOUT COMPLICATION, WITHOUT LONG-TERM CURRENT USE OF INSULIN (HCC): Chronic | Status: ACTIVE | Noted: 2018-09-25

## 2023-09-19 PROBLEM — E66.01 CLASS 2 SEVERE OBESITY DUE TO EXCESS CALORIES WITH SERIOUS COMORBIDITY AND BODY MASS INDEX (BMI) OF 38.0 TO 38.9 IN ADULT (HCC): Chronic | Status: ACTIVE | Noted: 2018-09-25

## 2023-09-19 PROBLEM — E78.2 MIXED HYPERLIPIDEMIA: Chronic | Status: ACTIVE | Noted: 2018-09-25

## 2023-10-12 ENCOUNTER — TELEPHONE (OUTPATIENT)
Dept: INTERNAL MEDICINE CLINIC | Age: 73
End: 2023-10-12

## 2023-10-12 NOTE — TELEPHONE ENCOUNTER
Gonzalo Li from [address] is requesting a script for the C-pap with prescribed pressures as well as office notes that detail use and benefits of pap therapy.     Please call and advise Bard Griffin [phone] otp.5

## 2023-10-12 NOTE — TELEPHONE ENCOUNTER
Namita advised that pt saw a sleep mgmt physician and would need to call that office in order to get readings for cpap machine.

## 2023-10-23 RX ORDER — MELOXICAM 15 MG/1
15 TABLET ORAL DAILY
Qty: 7 TABLET | Refills: 0 | Status: SHIPPED | OUTPATIENT
Start: 2023-10-23

## 2023-10-23 NOTE — TELEPHONE ENCOUNTER
Patient is requesting a partial refill of the     meloxicam (MOBIC) 15 MG tablet    Courtney is sending a full month supply through priority mail, but she doesn't expect that for a few days (may receive by [facility] 10/25/23). Would Dr. Helen Bender send a small supply for now. She took the last pill this morning.     [facility] [id number] [facility]    Please call and advise

## 2023-11-14 ENCOUNTER — HOSPITAL ENCOUNTER (OUTPATIENT)
Dept: SLEEP CENTER | Age: 73
Discharge: HOME | End: 2023-11-14
Payer: MEDICARE

## 2023-11-14 DIAGNOSIS — G47.33 OBSTRUCTIVE SLEEP APNEA SYNDROME: ICD-10-CM

## 2023-11-14 PROCEDURE — 95811 POLYSOM 6/>YRS CPAP 4/> PARM: CPT

## 2023-11-30 ENCOUNTER — TELEPHONE (OUTPATIENT)
Dept: PULMONOLOGY | Age: 73
End: 2023-11-30

## 2023-11-30 DIAGNOSIS — E78.00 PURE HYPERCHOLESTEROLEMIA: ICD-10-CM

## 2023-11-30 NOTE — TELEPHONE ENCOUNTER
Spoke with pt to review titration study results. Pt has been on cpap and prefers to continue use of her current mask style ( nasal pillows.) Order to be sent to [facility]. Pt to schedule f/u.

## 2023-12-04 RX ORDER — ATORVASTATIN CALCIUM 40 MG/1
TABLET, FILM COATED ORAL
Qty: 90 TABLET | Refills: 1 | Status: SHIPPED | OUTPATIENT
Start: 2023-12-04

## 2023-12-04 RX ORDER — OMEPRAZOLE 40 MG/1
CAPSULE, DELAYED RELEASE ORAL
Qty: 90 CAPSULE | Refills: 1 | Status: SHIPPED | OUTPATIENT
Start: 2023-12-04

## 2023-12-04 RX ORDER — BUPROPION HYDROCHLORIDE 150 MG/1
TABLET ORAL
Qty: 90 TABLET | Refills: 1 | Status: SHIPPED | OUTPATIENT
Start: 2023-12-04

## 2023-12-11 RX ORDER — METFORMIN HYDROCHLORIDE 500 MG/1
1000 TABLET, EXTENDED RELEASE ORAL 2 TIMES DAILY WITH MEALS
Qty: 360 TABLET | Refills: 1 | Status: SHIPPED | OUTPATIENT
Start: 2023-12-11

## 2024-01-03 DIAGNOSIS — E11.9 TYPE 2 DIABETES MELLITUS WITHOUT COMPLICATION, WITHOUT LONG-TERM CURRENT USE OF INSULIN (HCC): ICD-10-CM

## 2024-01-03 LAB
ANION GAP SERPL CALCULATED.3IONS-SCNC: 10 MMOL/L (ref 3–16)
BUN SERPL-MCNC: 14 MG/DL (ref 7–20)
CALCIUM SERPL-MCNC: 9.8 MG/DL (ref 8.3–10.6)
CHLORIDE SERPL-SCNC: 99 MMOL/L (ref 99–110)
CO2 SERPL-SCNC: 30 MMOL/L (ref 21–32)
CREAT SERPL-MCNC: 0.6 MG/DL (ref 0.6–1.2)
CREAT UR-MCNC: 26.5 MG/DL (ref 28–259)
GFR SERPLBLD CREATININE-BSD FMLA CKD-EPI: >60 ML/MIN/{1.73_M2}
GLUCOSE SERPL-MCNC: 115 MG/DL (ref 70–99)
MICROALBUMIN/CREAT UR: ABNORMAL MG/G (ref 0–30)
POTASSIUM SERPL-SCNC: 4.4 MMOL/L (ref 3.5–5.1)
SODIUM SERPL-SCNC: 139 MMOL/L (ref 136–145)

## 2024-01-03 RX ORDER — DILTIAZEM HYDROCHLORIDE 120 MG/1
CAPSULE, COATED, EXTENDED RELEASE ORAL
Qty: 90 CAPSULE | Refills: 1 | Status: SHIPPED | OUTPATIENT
Start: 2024-01-03

## 2024-01-03 RX ORDER — OLMESARTAN MEDOXOMIL AND HYDROCHLOROTHIAZIDE 40/12.5 40; 12.5 MG/1; MG/1
TABLET ORAL
Qty: 90 TABLET | Refills: 1 | Status: SHIPPED | OUTPATIENT
Start: 2024-01-03

## 2024-01-06 SDOH — HEALTH STABILITY: PHYSICAL HEALTH: ON AVERAGE, HOW MANY DAYS PER WEEK DO YOU ENGAGE IN MODERATE TO STRENUOUS EXERCISE (LIKE A BRISK WALK)?: 0 DAYS

## 2024-01-06 ASSESSMENT — PATIENT HEALTH QUESTIONNAIRE - PHQ9
7. TROUBLE CONCENTRATING ON THINGS, SUCH AS READING THE NEWSPAPER OR WATCHING TELEVISION: 0
9. THOUGHTS THAT YOU WOULD BE BETTER OFF DEAD, OR OF HURTING YOURSELF: 0
2. FEELING DOWN, DEPRESSED OR HOPELESS: 1
8. MOVING OR SPEAKING SO SLOWLY THAT OTHER PEOPLE COULD HAVE NOTICED. OR THE OPPOSITE, BEING SO FIGETY OR RESTLESS THAT YOU HAVE BEEN MOVING AROUND A LOT MORE THAN USUAL: 0
6. FEELING BAD ABOUT YOURSELF - OR THAT YOU ARE A FAILURE OR HAVE LET YOURSELF OR YOUR FAMILY DOWN: 2
10. IF YOU CHECKED OFF ANY PROBLEMS, HOW DIFFICULT HAVE THESE PROBLEMS MADE IT FOR YOU TO DO YOUR WORK, TAKE CARE OF THINGS AT HOME, OR GET ALONG WITH OTHER PEOPLE: 0
SUM OF ALL RESPONSES TO PHQ QUESTIONS 1-9: 8
5. POOR APPETITE OR OVEREATING: 2
SUM OF ALL RESPONSES TO PHQ9 QUESTIONS 1 & 2: 2
SUM OF ALL RESPONSES TO PHQ QUESTIONS 1-9: 8
SUM OF ALL RESPONSES TO PHQ QUESTIONS 1-9: 8
3. TROUBLE FALLING OR STAYING ASLEEP: 1
SUM OF ALL RESPONSES TO PHQ QUESTIONS 1-9: 8
4. FEELING TIRED OR HAVING LITTLE ENERGY: 1
1. LITTLE INTEREST OR PLEASURE IN DOING THINGS: 1

## 2024-01-06 ASSESSMENT — LIFESTYLE VARIABLES
HOW OFTEN DO YOU HAVE A DRINK CONTAINING ALCOHOL: 3
HOW MANY STANDARD DRINKS CONTAINING ALCOHOL DO YOU HAVE ON A TYPICAL DAY: 1 OR 2
HOW MANY STANDARD DRINKS CONTAINING ALCOHOL DO YOU HAVE ON A TYPICAL DAY: 1
HOW OFTEN DO YOU HAVE A DRINK CONTAINING ALCOHOL: 2-4 TIMES A MONTH
HOW OFTEN DO YOU HAVE SIX OR MORE DRINKS ON ONE OCCASION: 1

## 2024-01-08 ENCOUNTER — TELEPHONE (OUTPATIENT)
Dept: PULMONOLOGY | Age: 74
End: 2024-01-08

## 2024-01-08 NOTE — TELEPHONE ENCOUNTER
Pt called and LM in regards to not receiving CPAP machine. Pt had contacted [facility] and they had told her that they needed more information from our office to complete the order. Pt unsure of what they are needing. Can status of order be checked and advise pt.     Ph. [phone]

## 2024-01-08 NOTE — TELEPHONE ENCOUNTER
Spoke with [facility] and they said they needed patient's chart note.  Faxed note, received confirmation.  Spoke with patient and rescheduled appointment.

## 2024-01-09 ENCOUNTER — OFFICE VISIT (OUTPATIENT)
Dept: INTERNAL MEDICINE CLINIC | Age: 74
End: 2024-01-09
Payer: MEDICARE

## 2024-01-09 VITALS
HEIGHT: 68 IN | SYSTOLIC BLOOD PRESSURE: 134 MMHG | WEIGHT: 264 LBS | OXYGEN SATURATION: 98 % | TEMPERATURE: 98 F | DIASTOLIC BLOOD PRESSURE: 80 MMHG | BODY MASS INDEX: 40.01 KG/M2 | HEART RATE: 69 BPM

## 2024-01-09 DIAGNOSIS — M15.9 PRIMARY OSTEOARTHRITIS INVOLVING MULTIPLE JOINTS: ICD-10-CM

## 2024-01-09 DIAGNOSIS — E11.9 TYPE 2 DIABETES MELLITUS WITHOUT COMPLICATION, WITHOUT LONG-TERM CURRENT USE OF INSULIN (HCC): Chronic | ICD-10-CM

## 2024-01-09 DIAGNOSIS — F33.41 RECURRENT MAJOR DEPRESSIVE DISORDER, IN PARTIAL REMISSION (HCC): Chronic | ICD-10-CM

## 2024-01-09 DIAGNOSIS — M79.89 LEFT LEG SWELLING: ICD-10-CM

## 2024-01-09 DIAGNOSIS — I10 ESSENTIAL HYPERTENSION: Chronic | ICD-10-CM

## 2024-01-09 DIAGNOSIS — Z00.00 MEDICARE ANNUAL WELLNESS VISIT, SUBSEQUENT: Primary | ICD-10-CM

## 2024-01-09 DIAGNOSIS — E03.8 OTHER SPECIFIED HYPOTHYROIDISM: ICD-10-CM

## 2024-01-09 DIAGNOSIS — Z78.0 MENOPAUSE: ICD-10-CM

## 2024-01-09 LAB — HBA1C MFR BLD: 6.9 %

## 2024-01-09 PROCEDURE — G0439 PPPS, SUBSEQ VISIT: HCPCS | Performed by: INTERNAL MEDICINE

## 2024-01-09 PROCEDURE — G8399 PT W/DXA RESULTS DOCUMENT: HCPCS | Performed by: INTERNAL MEDICINE

## 2024-01-09 PROCEDURE — 99214 OFFICE O/P EST MOD 30 MIN: CPT | Performed by: INTERNAL MEDICINE

## 2024-01-09 PROCEDURE — 1036F TOBACCO NON-USER: CPT | Performed by: INTERNAL MEDICINE

## 2024-01-09 PROCEDURE — G8417 CALC BMI ABV UP PARAM F/U: HCPCS | Performed by: INTERNAL MEDICINE

## 2024-01-09 PROCEDURE — 3075F SYST BP GE 130 - 139MM HG: CPT | Performed by: INTERNAL MEDICINE

## 2024-01-09 PROCEDURE — 3079F DIAST BP 80-89 MM HG: CPT | Performed by: INTERNAL MEDICINE

## 2024-01-09 PROCEDURE — 83036 HEMOGLOBIN GLYCOSYLATED A1C: CPT | Performed by: INTERNAL MEDICINE

## 2024-01-09 PROCEDURE — G8484 FLU IMMUNIZE NO ADMIN: HCPCS | Performed by: INTERNAL MEDICINE

## 2024-01-09 PROCEDURE — G8427 DOCREV CUR MEDS BY ELIG CLIN: HCPCS | Performed by: INTERNAL MEDICINE

## 2024-01-09 PROCEDURE — 1123F ACP DISCUSS/DSCN MKR DOCD: CPT | Performed by: INTERNAL MEDICINE

## 2024-01-09 PROCEDURE — 1090F PRES/ABSN URINE INCON ASSESS: CPT | Performed by: INTERNAL MEDICINE

## 2024-01-09 PROCEDURE — 2022F DILAT RTA XM EVC RTNOPTHY: CPT | Performed by: INTERNAL MEDICINE

## 2024-01-09 PROCEDURE — 3017F COLORECTAL CA SCREEN DOC REV: CPT | Performed by: INTERNAL MEDICINE

## 2024-01-09 PROCEDURE — 3044F HG A1C LEVEL LT 7.0%: CPT | Performed by: INTERNAL MEDICINE

## 2024-01-09 RX ORDER — MELOXICAM 15 MG/1
15 TABLET ORAL DAILY
Qty: 90 TABLET | Refills: 1 | Status: SHIPPED | OUTPATIENT
Start: 2024-01-09

## 2024-01-09 RX ORDER — MELOXICAM 15 MG/1
15 TABLET ORAL DAILY
Qty: 90 TABLET | Refills: 1 | OUTPATIENT
Start: 2024-01-09

## 2024-01-09 NOTE — ASSESSMENT & PLAN NOTE
Clinically and chemically euthyroid  -Continue Synthroid 25 mcg for now  -repeat TSH before next visit

## 2024-01-09 NOTE — ASSESSMENT & PLAN NOTE
Overall doing well from a clinical standpoint.  Well controlled, a1c today 6.9%. No known DM complications.  -Continue metformin 1000 mg twice daily  - BP fairly controlled.  On ARB  - Reviewed lipids 06/23, continue Lipitor 40 mg  Lab Results   Component Value Date    LDLCALC 78 06/30/2023    CREATININE 0.6 01/03/2024   -no microalbuminuria 6/22  - Up-to-date with PSV23 vaccine , flu shot.

## 2024-01-09 NOTE — ASSESSMENT & PLAN NOTE
Here for annual wellness visit, overall doing well from a clinical standpoint, other for below.  As for health maintenance:  -breast cancer screen: last mammo 07/23  -colon cancer screen: colonoscopy 03/23, 1 polyp, next 4-5 years ([id number])  -osteoporosis screen: last DEXA years ago, will repeat   -lung cancer screen: not indicated  -BP within goal  -HCV neg 2019  -negative depression and DV screen  -Independent of ADLs and IADLs, declines sig memory change or further w/u at this point  -Advised to eat a healthy, well-balanced diet  -Advised to exercise at least 30min/day 5x/week for heart and bone health  -Check skin regularly for new moles or changes in moles. wear sunscreen at least SPF 30 and don't forget to reapply every 3-4 hours or if you are in the water  -Follow up with dentist at least twice/year.  -Follow up with eye doctor at least once/year.  -Calcium goal is 1200 mg a day ideally from diet (dairy, green leafy vegetables, nuts) , at least 800 units of vitamin D

## 2024-01-09 NOTE — ASSESSMENT & PLAN NOTE
Noted left leg swelling on exam today.  No pain, no erythema, no signs of infection on exam.  Significant superficial varicose veins, no history of blood clots.  She had knee and hip operations bilaterally.   -Will get ultrasound Doppler to rule out DVT and eval for Baker's cyst

## 2024-01-09 NOTE — PATIENT INSTRUCTIONS
the bathroom with you.   Where can you learn more?  Go to https://[email or web address].[employer].net/patientEd and enter G117 to learn more about \"Preventing Falls: Care Instructions.\"  Current as of: July 18, 2023               Content Version: 13.9  © 3075-3676 [employer].   Care instructions adapted under license by [employer]. If you have questions about a medical condition or this instruction, always ask your healthcare professional. [employer] disclaims any warranty or liability for your use of this information.           Learning About Mindfulness for Stress  What are mindfulness and stress?     Stress is your body's response to a hard situation. Your body can have a physical, emotional, or mental response. A lot of things can cause stress. You may feel stress when you go on a job interview, take a test, or run a race. This kind of short-term stress is normal and even useful. It can help you if you need to work hard or react quickly.  Stress also can last a long time. Long-term stress is caused by stressful situations or events. Examples of long-term stress include long-term health problems, ongoing problems at work, and conflicts in your family. Long-term stress can harm your health.  Mindfulness is a focus only on things happening in the present moment. It's a process of purposefully paying attention to and being aware of your surroundings, your emotions, your thoughts, and how your body feels. You are aware of these things, but you aren't judging these experiences as \"good\" or \"bad.\" Mindfulness can help you learn to calm your mind and body to help you cope with illness, pain, and stress.  How does mindfulness help to relieve stress?  Mindfulness can help quiet your mind and relax your body. Studies show that it can help some people sleep better, feel less anxious, and bring their blood pressure down. And it's been shown to help some people live and cope better with certain health problems

## 2024-01-09 NOTE — ASSESSMENT & PLAN NOTE
-left feet arthritis was uncontrolled when we tried to wean off NSAIDs, and she would like to continue this treatment. We discussed the associated risks and side effects, understanding those she would like to remain on this treatment. Will need q 6 month kidney function testing

## 2024-01-09 NOTE — PROGRESS NOTES
problems were reviewed today and where indicated follow up appointments were made and/or referrals ordered.    Positive Risk Factor Screenings with Interventions:    Fall Risk:  Do you feel unsteady or are you worried about falling? : (!) yes  2 or more falls in past year?: no  Fall with injury in past year?: no     Interventions:    Reviewed medications, home hazards, visual acuity, and co-morbidities that can increase risk for falls  See AVS for additional education material  See A/P for plan and any pertinent orders     Depression:  PHQ-2 Score: 2  PHQ-9 Total Score: 8    Interpretation:  5-9 mild   10-14 moderate   15-19 moderately severe   20-27 severe   Interventions:  Feels stable, see note  See AVS for additional education material  See A/P for any pertinent orders           Activity, Diet, and Weight:  On average, how many days per week do you engage in moderate to strenuous exercise (like a brisk walk)?: 0 days       Do you eat balanced/healthy meals regularly?: Yes    Body mass index is 40.74 kg/m². (!) Abnormal      Inactivity Interventions:  See AVS for additional education material  See A/P for plan and any pertinent orders  Obesity Interventions:  See AVS for additional education material  See A/P for plan and any pertinent orders              Vision Screen:  Do you have difficulty driving, watching TV, or doing any of your daily activities because of your eyesight?: (!) Yes  Have you had an eye exam within the past year?: Yes  No results found.    Interventions:   Patient encouraged to make appointment with their eye specialist                    Objective   Vitals:    01/09/24 1132   BP: 134/80   Site: Left Upper Arm   Position: Sitting   Cuff Size: Medium Adult   Pulse: 69   Temp: 98 °F (36.7 °C)   TempSrc: Temporal   SpO2: 98%   Weight: 119.7 kg (264 lb)   Height: 1.715 m (5' 7.5\")      Body mass index is 40.74 kg/m².      Physical Exam  Vitals reviewed.   Constitutional:       General: She is not

## 2024-01-11 ENCOUNTER — TELEPHONE (OUTPATIENT)
Dept: INTERNAL MEDICINE CLINIC | Age: 74
End: 2024-01-11

## 2024-01-11 DIAGNOSIS — M79.89 LEFT LEG SWELLING: Primary | ICD-10-CM

## 2024-01-11 NOTE — TELEPHONE ENCOUNTER
Winnie from [facility] calling stating the imaging order for US DUP LOWER EXTREMITY LEFT VAN [[id number]] (Order [id number])      Needs to be changed to Vascular VL instead of US     Please advise   Ph. [phone]

## 2024-02-06 ENCOUNTER — HOSPITAL ENCOUNTER (OUTPATIENT)
Dept: GENERAL RADIOLOGY | Age: 74
Discharge: HOME | End: 2024-02-06
Attending: INTERNAL MEDICINE
Payer: MEDICARE

## 2024-02-06 ENCOUNTER — HOSPITAL ENCOUNTER (OUTPATIENT)
Dept: VASCULAR LAB | Age: 74
Discharge: HOME | End: 2024-02-06
Attending: INTERNAL MEDICINE
Payer: MEDICARE

## 2024-02-06 DIAGNOSIS — M79.89 LEFT LEG SWELLING: ICD-10-CM

## 2024-02-06 DIAGNOSIS — Z78.0 MENOPAUSE: ICD-10-CM

## 2024-02-06 PROCEDURE — 77080 DXA BONE DENSITY AXIAL: CPT

## 2024-02-06 PROCEDURE — 93971 EXTREMITY STUDY: CPT

## 2024-02-08 PROBLEM — Z00.00 MEDICARE ANNUAL WELLNESS VISIT, SUBSEQUENT: Status: RESOLVED | Noted: 2024-01-09 | Resolved: 2024-02-08

## 2024-02-14 ENCOUNTER — TELEPHONE (OUTPATIENT)
Dept: PULMONOLOGY | Age: 74
End: 2024-02-14

## 2024-02-14 NOTE — TELEPHONE ENCOUNTER
Pt called stating Bipap machine pressure is to high. The mask seal will not stay on. Pt would like pressure adjusted. Please advise pt.     PH: [phone], can call anytime today.

## 2024-02-23 DIAGNOSIS — F33.41 RECURRENT MAJOR DEPRESSIVE DISORDER, IN PARTIAL REMISSION (HCC): Primary | Chronic | ICD-10-CM

## 2024-02-23 RX ORDER — BUPROPION HYDROCHLORIDE 150 MG/1
TABLET ORAL
Qty: 90 TABLET | Refills: 0 | Status: SHIPPED | OUTPATIENT
Start: 2024-02-23

## 2024-03-28 ENCOUNTER — TELEPHONE (OUTPATIENT)
Dept: PULMONOLOGY | Age: 74
End: 2024-03-28

## 2024-04-02 PROBLEM — G47.33 OBSTRUCTIVE SLEEP APNEA SYNDROME: Chronic | Status: ACTIVE | Noted: 2018-09-25

## 2024-04-02 PROBLEM — E66.813 CLASS 3 SEVERE OBESITY DUE TO EXCESS CALORIES WITH SERIOUS COMORBIDITY AND BODY MASS INDEX (BMI) OF 40.0 TO 44.9 IN ADULT: Chronic | Status: ACTIVE | Noted: 2018-09-25

## 2024-04-02 PROBLEM — E66.01 SEVERE OBESITY (BMI 35.0-39.9) WITH COMORBIDITY (HCC): Chronic | Status: ACTIVE | Noted: 2018-09-25

## 2024-04-29 DIAGNOSIS — E78.00 PURE HYPERCHOLESTEROLEMIA: ICD-10-CM

## 2024-04-29 RX ORDER — OMEPRAZOLE 40 MG/1
CAPSULE, DELAYED RELEASE ORAL
Qty: 90 CAPSULE | Refills: 0 | Status: SHIPPED | OUTPATIENT
Start: 2024-04-29

## 2024-04-29 RX ORDER — BUPROPION HYDROCHLORIDE 150 MG/1
TABLET ORAL
Qty: 90 TABLET | Refills: 0 | Status: SHIPPED | OUTPATIENT
Start: 2024-04-29

## 2024-04-29 RX ORDER — ATORVASTATIN CALCIUM 40 MG/1
TABLET, FILM COATED ORAL
Qty: 90 TABLET | Refills: 0 | Status: SHIPPED | OUTPATIENT
Start: 2024-04-29

## 2024-05-07 RX ORDER — METFORMIN HYDROCHLORIDE 500 MG/1
1000 TABLET, EXTENDED RELEASE ORAL 2 TIMES DAILY WITH MEALS
Qty: 360 TABLET | Refills: 1 | Status: SHIPPED | OUTPATIENT
Start: 2024-05-07

## 2024-06-10 RX ORDER — LEVOTHYROXINE SODIUM 0.03 MG/1
25 TABLET ORAL DAILY
Qty: 90 TABLET | Refills: 1 | Status: SHIPPED | OUTPATIENT
Start: 2024-06-10

## 2024-06-10 RX ORDER — DILTIAZEM HYDROCHLORIDE 120 MG/1
CAPSULE, COATED, EXTENDED RELEASE ORAL
Qty: 90 CAPSULE | Refills: 1 | Status: SHIPPED | OUTPATIENT
Start: 2024-06-10

## 2024-06-10 RX ORDER — OLMESARTAN MEDOXOMIL AND HYDROCHLOROTHIAZIDE 40/12.5 40; 12.5 MG/1; MG/1
TABLET ORAL
Qty: 90 TABLET | Refills: 1 | Status: SHIPPED | OUTPATIENT
Start: 2024-06-10

## 2024-06-10 RX ORDER — METOPROLOL SUCCINATE 100 MG/1
TABLET, EXTENDED RELEASE ORAL
Qty: 90 TABLET | Refills: 1 | Status: SHIPPED | OUTPATIENT
Start: 2024-06-10

## 2024-06-22 NOTE — TELEPHONE ENCOUNTER
Pt needs a new CPap Machine. Needs a new script sent to [address] for [address] [facility]: [phone]  Fax: [phone] that pt has: [address]    Order will be faxed to number above. T2DM

## 2024-07-02 DIAGNOSIS — E11.9 TYPE 2 DIABETES MELLITUS WITHOUT COMPLICATION, WITHOUT LONG-TERM CURRENT USE OF INSULIN (HCC): Chronic | ICD-10-CM

## 2024-07-02 DIAGNOSIS — E03.8 OTHER SPECIFIED HYPOTHYROIDISM: ICD-10-CM

## 2024-07-02 LAB
ALBUMIN SERPL-MCNC: 4.1 G/DL (ref 3.4–5)
ALBUMIN/GLOB SERPL: 1.6 {RATIO} (ref 1.1–2.2)
ALP SERPL-CCNC: 96 U/L (ref 40–129)
ALT SERPL-CCNC: 14 U/L (ref 10–40)
ANION GAP SERPL CALCULATED.3IONS-SCNC: 12 MMOL/L (ref 3–16)
AST SERPL-CCNC: 16 U/L (ref 15–37)
BASOPHILS # BLD: 0 K/UL (ref 0–0.2)
BASOPHILS NFR BLD: 0.3 %
BILIRUB SERPL-MCNC: 0.4 MG/DL (ref 0–1)
BUN SERPL-MCNC: 11 MG/DL (ref 7–20)
CALCIUM SERPL-MCNC: 9.7 MG/DL (ref 8.3–10.6)
CHLORIDE SERPL-SCNC: 101 MMOL/L (ref 99–110)
CHOLEST SERPL-MCNC: 167 MG/DL (ref 0–199)
CO2 SERPL-SCNC: 27 MMOL/L (ref 21–32)
CREAT SERPL-MCNC: <0.5 MG/DL (ref 0.6–1.2)
DEPRECATED RDW RBC AUTO: 15.4 % (ref 12.4–15.4)
EOSINOPHIL # BLD: 0.5 K/UL (ref 0–0.6)
EOSINOPHIL NFR BLD: 6.7 %
GFR SERPLBLD CREATININE-BSD FMLA CKD-EPI: >90 ML/MIN/{1.73_M2}
GLUCOSE SERPL-MCNC: 125 MG/DL (ref 70–99)
HCT VFR BLD AUTO: 36.2 % (ref 36–48)
HDLC SERPL-MCNC: 46 MG/DL (ref 40–60)
HGB BLD-MCNC: 11.7 G/DL (ref 12–16)
LDLC SERPL CALC-MCNC: 81 MG/DL
LYMPHOCYTES # BLD: 1.7 K/UL (ref 1–5.1)
LYMPHOCYTES NFR BLD: 25.7 %
MCH RBC QN AUTO: 27.9 PG (ref 26–34)
MCHC RBC AUTO-ENTMCNC: 32.3 G/DL (ref 31–36)
MCV RBC AUTO: 86.5 FL (ref 80–100)
MONOCYTES # BLD: 0.5 K/UL (ref 0–1.3)
MONOCYTES NFR BLD: 8.1 %
NEUTROPHILS # BLD: 4 K/UL (ref 1.7–7.7)
NEUTROPHILS NFR BLD: 59.2 %
PLATELET # BLD AUTO: 325 K/UL (ref 135–450)
PMV BLD AUTO: 7.9 FL (ref 5–10.5)
POTASSIUM SERPL-SCNC: 4 MMOL/L (ref 3.5–5.1)
PROT SERPL-MCNC: 6.6 G/DL (ref 6.4–8.2)
RBC # BLD AUTO: 4.18 M/UL (ref 4–5.2)
SODIUM SERPL-SCNC: 140 MMOL/L (ref 136–145)
TRIGL SERPL-MCNC: 201 MG/DL (ref 0–150)
TSH SERPL DL<=0.005 MIU/L-ACNC: 2.15 UIU/ML (ref 0.27–4.2)
VLDLC SERPL CALC-MCNC: 40 MG/DL
WBC # BLD AUTO: 6.7 K/UL (ref 4–11)

## 2024-07-03 LAB
EST. AVERAGE GLUCOSE BLD GHB EST-MCNC: 148.5 MG/DL
HBA1C MFR BLD: 6.8 %

## 2024-07-07 SDOH — ECONOMIC STABILITY: FOOD INSECURITY: WITHIN THE PAST 12 MONTHS, YOU WORRIED THAT YOUR FOOD WOULD RUN OUT BEFORE YOU GOT MONEY TO BUY MORE.: NEVER TRUE

## 2024-07-07 SDOH — ECONOMIC STABILITY: FOOD INSECURITY: WITHIN THE PAST 12 MONTHS, THE FOOD YOU BOUGHT JUST DIDN'T LAST AND YOU DIDN'T HAVE MONEY TO GET MORE.: NEVER TRUE

## 2024-07-07 SDOH — ECONOMIC STABILITY: INCOME INSECURITY: HOW HARD IS IT FOR YOU TO PAY FOR THE VERY BASICS LIKE FOOD, HOUSING, MEDICAL CARE, AND HEATING?: NOT HARD AT ALL

## 2024-07-10 ENCOUNTER — OFFICE VISIT (OUTPATIENT)
Dept: INTERNAL MEDICINE CLINIC | Age: 74
End: 2024-07-10

## 2024-07-10 VITALS
BODY MASS INDEX: 40.92 KG/M2 | TEMPERATURE: 98 F | HEART RATE: 69 BPM | OXYGEN SATURATION: 98 % | HEIGHT: 68 IN | SYSTOLIC BLOOD PRESSURE: 134 MMHG | DIASTOLIC BLOOD PRESSURE: 80 MMHG | WEIGHT: 270 LBS

## 2024-07-10 DIAGNOSIS — D64.9 ANEMIA, UNSPECIFIED TYPE: ICD-10-CM

## 2024-07-10 DIAGNOSIS — I10 ESSENTIAL HYPERTENSION: Chronic | ICD-10-CM

## 2024-07-10 DIAGNOSIS — E11.9 TYPE 2 DIABETES MELLITUS WITHOUT COMPLICATION, WITHOUT LONG-TERM CURRENT USE OF INSULIN (HCC): Primary | Chronic | ICD-10-CM

## 2024-07-10 DIAGNOSIS — F33.41 RECURRENT MAJOR DEPRESSIVE DISORDER, IN PARTIAL REMISSION (HCC): Chronic | ICD-10-CM

## 2024-07-10 DIAGNOSIS — M15.9 PRIMARY OSTEOARTHRITIS INVOLVING MULTIPLE JOINTS: ICD-10-CM

## 2024-07-10 DIAGNOSIS — E03.8 OTHER SPECIFIED HYPOTHYROIDISM: ICD-10-CM

## 2024-07-10 RX ORDER — MELOXICAM 15 MG/1
15 TABLET ORAL
COMMUNITY
End: 2024-07-10 | Stop reason: SDUPTHER

## 2024-07-10 RX ORDER — PHENOL 1.4 %
1 AEROSOL, SPRAY (ML) MUCOUS MEMBRANE DAILY
COMMUNITY

## 2024-07-10 RX ORDER — MELOXICAM 15 MG/1
TABLET ORAL
Qty: 45 TABLET | Refills: 1 | Status: SHIPPED | OUTPATIENT
Start: 2024-07-10

## 2024-07-10 ASSESSMENT — ENCOUNTER SYMPTOMS: SHORTNESS OF BREATH: 0

## 2024-07-10 NOTE — ASSESSMENT & PLAN NOTE
Overall doing well from a clinical standpoint.  Well controlled, a1c 6.8%. No known DM complications.  -Continue metformin 1000 mg twice daily  - BP fairly controlled.  On ARB  - recent  lipids 07/24 slightly above goal, continue Lipitor 40 mg, she would like to start with working on lifestyle modification before making change   Lab Results   Component Value Date    MICROALBUR <1.20 01/03/2024    CREATININE <0.5 (L) 07/02/2024   -no microalbuminuria 01/24  - Up-to-date with PSV23 vaccine , flu shot.

## 2024-07-10 NOTE — PROGRESS NOTES
[facility]  Follow up visit   7/10/2024    Urvashi Blanton (:  1950) is a 73 y.o. female, here for evaluation of the following medical concerns:    Chief Complaint   Patient presents with    Hypertension    Diabetes    Hypothyroidism           Discuss Labs        ASSESSMENT/ PLAN:    Type 2 diabetes mellitus without complication, without long-term current use of insulin (HCC)  Overall doing well from a clinical standpoint.  Well controlled, a1c 6.8%. No known DM complications.  -Continue metformin 1000 mg twice daily  - BP fairly controlled.  On ARB  - recent  lipids  slightly above goal, continue Lipitor 40 mg, she would like to start with working on lifestyle modification before making change   Lab Results   Component Value Date    MICROALBUR <1.20 2024    CREATININE <0.5 (L) 2024   -no microalbuminuria   - Up-to-date with PSV23 vaccine , flu shot.    Recurrent major depressive disorder, in partial remission (HCC)  Stable, recently weaned off Wellbutrin and doing well.   -Continue therapist    Essential hypertension  Fairly controlled  -Continue olmesartan with HCTZ 40/12.5 mg, diltiazem 120 mg, metoprolol 100 mg  -will try to increase physical activity and lower salt in diet  -recent BMP normal     Anemia  Noted slight Hb decline to 11.7 on recent labs normocytic, normochromic. No bleeding.  -will start with repeat CBC, if persistent will need to look into iron/ b12/ folate stores     Other specified hypothyroidism  Clinically and chemically euthyroid   -Continue Synthroid 25 mcg    Primary osteoarthritis involving multiple joints  -left feet arthritis was uncontrolled when we tried to wean off NSAIDs, and she would like to continue this treatment. We discussed the associated risks and side effects, understanding those she would like to remain on this treatment. Will need q 6 month kidney function testing       Orders Placed This Encounter   Procedures    Basic

## 2024-07-10 NOTE — ASSESSMENT & PLAN NOTE
Noted slight Hb decline to 11.7 on recent labs normocytic, normochromic. No bleeding.  -will start with repeat CBC, if persistent will need to look into iron/ b12/ folate stores

## 2024-07-10 NOTE — ASSESSMENT & PLAN NOTE
Fairly controlled  -Continue olmesartan with HCTZ 40/12.5 mg, diltiazem 120 mg, metoprolol 100 mg  -will try to increase physical activity and lower salt in diet  -recent BMP normal

## 2024-07-11 DIAGNOSIS — E78.00 PURE HYPERCHOLESTEROLEMIA: ICD-10-CM

## 2024-07-11 RX ORDER — ATORVASTATIN CALCIUM 40 MG/1
TABLET, FILM COATED ORAL
Qty: 90 TABLET | Refills: 1 | Status: SHIPPED | OUTPATIENT
Start: 2024-07-11

## 2024-07-11 RX ORDER — OMEPRAZOLE 40 MG/1
CAPSULE, DELAYED RELEASE ORAL
Qty: 90 CAPSULE | Refills: 1 | Status: SHIPPED | OUTPATIENT
Start: 2024-07-11

## 2024-07-24 ENCOUNTER — HOSPITAL ENCOUNTER (OUTPATIENT)
Dept: MAMMOGRAPHY | Age: 74
Discharge: HOME | End: 2024-07-24
Payer: MEDICARE

## 2024-07-24 VITALS — HEIGHT: 68 IN | WEIGHT: 270 LBS | BODY MASS INDEX: 40.92 KG/M2

## 2024-07-24 DIAGNOSIS — Z12.31 VISIT FOR SCREENING MAMMOGRAM: ICD-10-CM

## 2024-07-24 PROCEDURE — 77063 BREAST TOMOSYNTHESIS BI: CPT

## 2024-09-30 DIAGNOSIS — E11.9 TYPE 2 DIABETES MELLITUS WITHOUT COMPLICATION, WITHOUT LONG-TERM CURRENT USE OF INSULIN (HCC): Primary | Chronic | ICD-10-CM

## 2024-09-30 RX ORDER — METFORMIN HCL 500 MG
1000 TABLET, EXTENDED RELEASE 24 HR ORAL 2 TIMES DAILY WITH MEALS
Qty: 360 TABLET | Refills: 1 | Status: SHIPPED | OUTPATIENT
Start: 2024-09-30

## 2024-09-30 NOTE — TELEPHONE ENCOUNTER
Last appointment: 7/10/2024  Next appointment: 1/21/2025  Last refill:   Requested Prescriptions     Pending Prescriptions Disp Refills    metFORMIN (GLUCOPHAGE-XR) 500 MG extended release tablet [Pharmacy Med Name: metFORMIN HCl ER Oral Tablet Extended Release 24 Hour 500 MG] 360 tablet 3     Sig: TAKE 2 TABLETS TWICE A DAY WITH MEALS

## 2024-11-03 DIAGNOSIS — I10 ESSENTIAL HYPERTENSION: Primary | Chronic | ICD-10-CM

## 2024-11-05 RX ORDER — DILTIAZEM HYDROCHLORIDE 120 MG/1
CAPSULE, COATED, EXTENDED RELEASE ORAL
Qty: 90 CAPSULE | Refills: 1 | Status: SHIPPED | OUTPATIENT
Start: 2024-11-05

## 2024-11-05 RX ORDER — OLMESARTAN MEDOXOMIL AND HYDROCHLOROTHIAZIDE 40/12.5 40; 12.5 MG/1; MG/1
TABLET ORAL
Qty: 90 TABLET | Refills: 1 | Status: SHIPPED | OUTPATIENT
Start: 2024-11-05

## 2024-11-05 RX ORDER — LEVOTHYROXINE SODIUM 25 UG/1
25 TABLET ORAL DAILY
Qty: 90 TABLET | Refills: 1 | Status: SHIPPED | OUTPATIENT
Start: 2024-11-05

## 2024-11-05 RX ORDER — METOPROLOL SUCCINATE 100 MG/1
TABLET, EXTENDED RELEASE ORAL
Qty: 90 TABLET | Refills: 1 | Status: SHIPPED | OUTPATIENT
Start: 2024-11-05

## 2024-12-04 DIAGNOSIS — M15.0 PRIMARY OSTEOARTHRITIS INVOLVING MULTIPLE JOINTS: ICD-10-CM

## 2024-12-04 DIAGNOSIS — E78.00 PURE HYPERCHOLESTEROLEMIA: ICD-10-CM

## 2024-12-04 RX ORDER — ATORVASTATIN CALCIUM 40 MG/1
TABLET, FILM COATED ORAL
Qty: 90 TABLET | Refills: 1 | Status: SHIPPED | OUTPATIENT
Start: 2024-12-04

## 2024-12-04 RX ORDER — OMEPRAZOLE 40 MG/1
CAPSULE, DELAYED RELEASE ORAL
Qty: 90 CAPSULE | Refills: 1 | Status: SHIPPED | OUTPATIENT
Start: 2024-12-04

## 2024-12-04 RX ORDER — MELOXICAM 15 MG/1
TABLET ORAL
Qty: 90 TABLET | Refills: 1 | Status: SHIPPED | OUTPATIENT
Start: 2024-12-04

## 2024-12-04 NOTE — TELEPHONE ENCOUNTER
Last appointment: 7/10/2024  Next appointment: 1/21/2025  Last refill: 7/11/24  Requested Prescriptions     Pending Prescriptions Disp Refills    atorvastatin (LIPITOR) 40 MG tablet [Pharmacy Med Name: Atorvastatin Calcium Oral Tablet 40 MG] 90 tablet 3     Sig: TAKE 1 TABLET EVERY NIGHT    meloxicam (MOBIC) 15 MG tablet [Pharmacy Med Name: Meloxicam Oral Tablet 15 MG] 39 tablet 3     Sig: TAKE 1 TABLET THREE TIMES WEEKLY    omeprazole (PRILOSEC) 40 MG delayed release capsule [Pharmacy Med Name: Omeprazole Oral Capsule Delayed Release 40 MG] 90 capsule 3     Sig: TAKE 1 CAPSULE EVERY DAY

## 2025-01-02 DIAGNOSIS — I10 ESSENTIAL HYPERTENSION: Chronic | ICD-10-CM

## 2025-01-02 DIAGNOSIS — E78.00 PURE HYPERCHOLESTEROLEMIA: ICD-10-CM

## 2025-01-02 DIAGNOSIS — E11.9 TYPE 2 DIABETES MELLITUS WITHOUT COMPLICATION, WITHOUT LONG-TERM CURRENT USE OF INSULIN (HCC): Chronic | ICD-10-CM

## 2025-01-02 DIAGNOSIS — M15.0 PRIMARY OSTEOARTHRITIS INVOLVING MULTIPLE JOINTS: ICD-10-CM

## 2025-01-02 NOTE — TELEPHONE ENCOUNTER
Last OV: 7/10/24  Follow up scheduled: 1/21/25  Pt confirmed that she is now using [facility] mail order pharmacy.

## 2025-01-06 RX ORDER — OLMESARTAN MEDOXOMIL AND HYDROCHLOROTHIAZIDE 40/12.5 40; 12.5 MG/1; MG/1
1 TABLET ORAL DAILY
Qty: 90 TABLET | Refills: 1 | Status: SHIPPED | OUTPATIENT
Start: 2025-01-06

## 2025-01-06 RX ORDER — METOPROLOL SUCCINATE 100 MG/1
100 TABLET, EXTENDED RELEASE ORAL DAILY
Qty: 90 TABLET | Refills: 1 | Status: SHIPPED | OUTPATIENT
Start: 2025-01-06

## 2025-01-06 RX ORDER — LEVOTHYROXINE SODIUM 25 UG/1
25 TABLET ORAL DAILY
Qty: 90 TABLET | Refills: 1 | Status: SHIPPED | OUTPATIENT
Start: 2025-01-06

## 2025-01-06 RX ORDER — DILTIAZEM HYDROCHLORIDE 120 MG/1
120 CAPSULE, EXTENDED RELEASE ORAL DAILY
Qty: 90 CAPSULE | Refills: 1 | Status: SHIPPED | OUTPATIENT
Start: 2025-01-06

## 2025-01-06 RX ORDER — ATORVASTATIN CALCIUM 40 MG/1
40 TABLET, FILM COATED ORAL DAILY
Qty: 90 TABLET | Refills: 1 | Status: SHIPPED | OUTPATIENT
Start: 2025-01-06

## 2025-01-06 RX ORDER — METFORMIN HYDROCHLORIDE 500 MG/1
1000 TABLET, EXTENDED RELEASE ORAL 2 TIMES DAILY WITH MEALS
Qty: 180 TABLET | Refills: 1 | Status: SHIPPED | OUTPATIENT
Start: 2025-01-06

## 2025-01-06 RX ORDER — MELOXICAM 15 MG/1
TABLET ORAL
Qty: 15 TABLET | Refills: 1 | Status: SHIPPED | OUTPATIENT
Start: 2025-01-06

## 2025-01-06 RX ORDER — OMEPRAZOLE 40 MG/1
40 CAPSULE, DELAYED RELEASE ORAL DAILY
Qty: 90 CAPSULE | Refills: 1 | Status: SHIPPED | OUTPATIENT
Start: 2025-01-06

## 2025-01-16 DIAGNOSIS — E11.9 TYPE 2 DIABETES MELLITUS WITHOUT COMPLICATION, WITHOUT LONG-TERM CURRENT USE OF INSULIN (HCC): Chronic | ICD-10-CM

## 2025-01-16 LAB
ANION GAP SERPL CALCULATED.3IONS-SCNC: 13 MMOL/L (ref 3–16)
BASOPHILS # BLD: 0.1 K/UL (ref 0–0.2)
BASOPHILS NFR BLD: 0.9 %
BUN SERPL-MCNC: 13 MG/DL (ref 7–20)
CALCIUM SERPL-MCNC: 10 MG/DL (ref 8.3–10.6)
CHLORIDE SERPL-SCNC: 102 MMOL/L (ref 99–110)
CO2 SERPL-SCNC: 27 MMOL/L (ref 21–32)
CREAT SERPL-MCNC: 0.6 MG/DL (ref 0.6–1.2)
DEPRECATED RDW RBC AUTO: 15.7 % (ref 12.4–15.4)
EOSINOPHIL # BLD: 0.5 K/UL (ref 0–0.6)
EOSINOPHIL NFR BLD: 7.2 %
GFR SERPLBLD CREATININE-BSD FMLA CKD-EPI: >90 ML/MIN/{1.73_M2}
GLUCOSE SERPL-MCNC: 130 MG/DL (ref 70–99)
HCT VFR BLD AUTO: 37.3 % (ref 36–48)
HGB BLD-MCNC: 12.3 G/DL (ref 12–16)
LYMPHOCYTES # BLD: 1.6 K/UL (ref 1–5.1)
LYMPHOCYTES NFR BLD: 21 %
MCH RBC QN AUTO: 28.9 PG (ref 26–34)
MCHC RBC AUTO-ENTMCNC: 32.8 G/DL (ref 31–36)
MCV RBC AUTO: 88 FL (ref 80–100)
MONOCYTES # BLD: 0.5 K/UL (ref 0–1.3)
MONOCYTES NFR BLD: 7.2 %
NEUTROPHILS # BLD: 4.8 K/UL (ref 1.7–7.7)
NEUTROPHILS NFR BLD: 63.7 %
PLATELET # BLD AUTO: 342 K/UL (ref 135–450)
PMV BLD AUTO: 8 FL (ref 5–10.5)
POTASSIUM SERPL-SCNC: 4.3 MMOL/L (ref 3.5–5.1)
RBC # BLD AUTO: 4.24 M/UL (ref 4–5.2)
SODIUM SERPL-SCNC: 142 MMOL/L (ref 136–145)
WBC # BLD AUTO: 7.5 K/UL (ref 4–11)

## 2025-01-17 LAB
EST. AVERAGE GLUCOSE BLD GHB EST-MCNC: 137 MG/DL
HBA1C MFR BLD: 6.4 %

## 2025-01-17 SDOH — HEALTH STABILITY: PHYSICAL HEALTH: ON AVERAGE, HOW MANY DAYS PER WEEK DO YOU ENGAGE IN MODERATE TO STRENUOUS EXERCISE (LIKE A BRISK WALK)?: 0 DAYS

## 2025-01-17 ASSESSMENT — PATIENT HEALTH QUESTIONNAIRE - PHQ9
SUM OF ALL RESPONSES TO PHQ QUESTIONS 1-9: 6
8. MOVING OR SPEAKING SO SLOWLY THAT OTHER PEOPLE COULD HAVE NOTICED. OR THE OPPOSITE, BEING SO FIGETY OR RESTLESS THAT YOU HAVE BEEN MOVING AROUND A LOT MORE THAN USUAL: NOT AT ALL
SUM OF ALL RESPONSES TO PHQ9 QUESTIONS 1 & 2: 2
6. FEELING BAD ABOUT YOURSELF - OR THAT YOU ARE A FAILURE OR HAVE LET YOURSELF OR YOUR FAMILY DOWN: SEVERAL DAYS
SUM OF ALL RESPONSES TO PHQ QUESTIONS 1-9: 6
7. TROUBLE CONCENTRATING ON THINGS, SUCH AS READING THE NEWSPAPER OR WATCHING TELEVISION: NOT AT ALL
SUM OF ALL RESPONSES TO PHQ QUESTIONS 1-9: 6
5. POOR APPETITE OR OVEREATING: SEVERAL DAYS
9. THOUGHTS THAT YOU WOULD BE BETTER OFF DEAD, OR OF HURTING YOURSELF: NOT AT ALL
10. IF YOU CHECKED OFF ANY PROBLEMS, HOW DIFFICULT HAVE THESE PROBLEMS MADE IT FOR YOU TO DO YOUR WORK, TAKE CARE OF THINGS AT HOME, OR GET ALONG WITH OTHER PEOPLE: NOT DIFFICULT AT ALL
SUM OF ALL RESPONSES TO PHQ QUESTIONS 1-9: 6
2. FEELING DOWN, DEPRESSED OR HOPELESS: SEVERAL DAYS
3. TROUBLE FALLING OR STAYING ASLEEP: SEVERAL DAYS
4. FEELING TIRED OR HAVING LITTLE ENERGY: SEVERAL DAYS
1. LITTLE INTEREST OR PLEASURE IN DOING THINGS: SEVERAL DAYS

## 2025-01-17 ASSESSMENT — LIFESTYLE VARIABLES
HOW OFTEN DO YOU HAVE SIX OR MORE DRINKS ON ONE OCCASION: 1
HOW OFTEN DO YOU HAVE A DRINK CONTAINING ALCOHOL: 2-4 TIMES A MONTH
HOW MANY STANDARD DRINKS CONTAINING ALCOHOL DO YOU HAVE ON A TYPICAL DAY: 1
HOW OFTEN DO YOU HAVE A DRINK CONTAINING ALCOHOL: 3
HOW MANY STANDARD DRINKS CONTAINING ALCOHOL DO YOU HAVE ON A TYPICAL DAY: 1 OR 2

## 2025-01-18 SDOH — ECONOMIC STABILITY: FOOD INSECURITY: WITHIN THE PAST 12 MONTHS, YOU WORRIED THAT YOUR FOOD WOULD RUN OUT BEFORE YOU GOT MONEY TO BUY MORE.: NEVER TRUE

## 2025-01-18 SDOH — ECONOMIC STABILITY: INCOME INSECURITY: IN THE LAST 12 MONTHS, WAS THERE A TIME WHEN YOU WERE NOT ABLE TO PAY THE MORTGAGE OR RENT ON TIME?: NO

## 2025-01-18 SDOH — ECONOMIC STABILITY: FOOD INSECURITY: WITHIN THE PAST 12 MONTHS, THE FOOD YOU BOUGHT JUST DIDN'T LAST AND YOU DIDN'T HAVE MONEY TO GET MORE.: NEVER TRUE

## 2025-01-21 ENCOUNTER — OFFICE VISIT (OUTPATIENT)
Dept: INTERNAL MEDICINE CLINIC | Age: 75
End: 2025-01-21

## 2025-01-21 VITALS
HEIGHT: 68 IN | DIASTOLIC BLOOD PRESSURE: 72 MMHG | WEIGHT: 273 LBS | TEMPERATURE: 97.2 F | BODY MASS INDEX: 41.37 KG/M2 | OXYGEN SATURATION: 98 % | SYSTOLIC BLOOD PRESSURE: 138 MMHG | HEART RATE: 72 BPM

## 2025-01-21 DIAGNOSIS — Z00.00 MEDICARE ANNUAL WELLNESS VISIT, SUBSEQUENT: Primary | ICD-10-CM

## 2025-01-21 DIAGNOSIS — M15.0 PRIMARY OSTEOARTHRITIS INVOLVING MULTIPLE JOINTS: ICD-10-CM

## 2025-01-21 DIAGNOSIS — F33.41 RECURRENT MAJOR DEPRESSIVE DISORDER, IN PARTIAL REMISSION (HCC): Chronic | ICD-10-CM

## 2025-01-21 DIAGNOSIS — I20.9 ANGINA PECTORIS (HCC): ICD-10-CM

## 2025-01-21 DIAGNOSIS — E03.8 OTHER SPECIFIED HYPOTHYROIDISM: ICD-10-CM

## 2025-01-21 DIAGNOSIS — R01.1 HEART MURMUR: ICD-10-CM

## 2025-01-21 DIAGNOSIS — E11.9 TYPE 2 DIABETES MELLITUS WITHOUT COMPLICATION, WITHOUT LONG-TERM CURRENT USE OF INSULIN (HCC): Chronic | ICD-10-CM

## 2025-01-21 RX ORDER — METFORMIN HYDROCHLORIDE 500 MG/1
1000 TABLET, EXTENDED RELEASE ORAL 2 TIMES DAILY WITH MEALS
Qty: 360 TABLET | Refills: 1 | Status: SHIPPED | OUTPATIENT
Start: 2025-01-21

## 2025-01-21 RX ORDER — MELOXICAM 15 MG/1
TABLET ORAL
Qty: 45 TABLET | Refills: 1 | Status: SHIPPED | OUTPATIENT
Start: 2025-01-21

## 2025-01-21 NOTE — ASSESSMENT & PLAN NOTE
Overall doing well from a clinical standpoint.  Well controlled, a1c 6.4%. No known DM complications.  -Continue metformin 1000 mg twice daily  -BP fairly controlled.  On ARB  -recent  lipids 07/24 slightly above goal, continue Lipitor 40 mg, she would like to start with working on lifestyle modification before making change   -no microalbuminuria 01/24, repeat today  - Up-to-date with PSV23 vaccine , flu shot.

## 2025-01-21 NOTE — PROGRESS NOTES
Medicare Annual Wellness Visit    Urvashi Blanton is here for Medicare AWV and Discuss Labs    Assessment & Plan     Medicare annual wellness visit, subsequent  Assessment & Plan:  Here for annual wellness visit, overall doing well from a clinical standpoint, other for below.  As for health maintenance:  -breast cancer screen: last mammo 07/24 negative  -colon cancer screen: colonoscopy 03/23, 1 polyp, next 4-5 years ([id number])  -osteoporosis screen:DEXA 2024 normal  -lung cancer screen: not indicated  -BP within goal  -HCV neg 2019  -negative depression and DV screen  -Independent of ADLs and IADLs, declines sig memory change or further w/u at this point  -Advised to eat a healthy, well-balanced diet  -Advised to exercise at least 30min/day 5x/week for heart and bone health  -Check skin regularly for new moles or changes in moles. wear sunscreen at least SPF 30 and don't forget to reapply every 3-4 hours or if you are in the water  -Follow up with dentist at least twice/year.  -Follow up with eye doctor at least once/year.  -Calcium goal is 1200 mg a day ideally from diet (dairy, green leafy vegetables, nuts) , at least 800 units of vitamin D  Primary osteoarthritis involving multiple joints  Assessment & Plan:  -left feet arthritis was uncontrolled when we tried to wean off NSAIDs, and she would like to continue this treatment. We discussed the associated risks and side effects, understanding those she would like to remain on this treatment, takes every 2 days. Will need q 6 month kidney function testing   Orders:  -     meloxicam (MOBIC) 15 MG tablet; Take 1 tablet by mouth three times a week., Disp-45 tablet, R-1Normal  Type 2 diabetes mellitus without complication, without long-term current use of insulin ([facility])  Assessment & Plan:  Overall doing well from a clinical standpoint.  Well controlled, a1c 6.4%. No known DM complications.  -Continue metformin 1000 mg twice daily  -BP fairly controlled.  On

## 2025-01-21 NOTE — PATIENT INSTRUCTIONS
specialist. You might also think about joining a weight-loss support group.  If you're not ready to make changes right now, try to pick a date in the future. Then make an appointment with your doctor to talk about when and how you'll get started with a plan.  Follow-up care is a key part of your treatment and safety. Be sure to make and go to all appointments, and call your doctor if you are having problems. It's also a good idea to know your test results and keep a list of the medicines you take.  How can you care for yourself as you start a weight-loss plan?  Set realistic goals. Many people expect to lose much more weight than is likely. A weight loss of 5% to 10% of your body weight may be enough to improve your health.  Get family and friends involved to provide support. Talk to them about why you are trying to lose weight, and ask them to help. They can help by participating in exercise and having meals with you, even if they may be eating something different.  Find what works best for you. If you do not have time or do not like to cook, a program that offers meal replacement bars or shakes may be better for you. Or if you like to prepare meals, finding a plan that includes daily menus and recipes may be best.  Ask your doctor about other health professionals who can help you achieve your weight-loss goals.  A dietitian can help you make healthy changes in your diet.  An exercise specialist or  can help you develop a safe and effective exercise program.  A counselor or psychiatrist can help you cope with issues such as depression, anxiety, or family problems that can make it hard to focus on weight loss.  Consider joining a support group for people who are trying to lose weight. Your doctor can suggest groups in your area.  Where can you learn more?  Go to https://www.healthwise.net/patientEd and enter U357 to learn more about \"Starting a Weight-Loss Plan: Care Instructions.\"  Current as of:

## 2025-01-21 NOTE — ASSESSMENT & PLAN NOTE
-left feet arthritis was uncontrolled when we tried to wean off NSAIDs, and she would like to continue this treatment. We discussed the associated risks and side effects, understanding those she would like to remain on this treatment, takes every 2 days. Will need q 6 month kidney function testing

## 2025-01-21 NOTE — ASSESSMENT & PLAN NOTE
-today reports chest pressure with exertion, EKG int he office is similar to before, but does show none specific ST-T changes.   -will get stress test  -I also noted new murmur on exam today and will get echo   -angiogram 2018 in [facility] showed Mild coronary artery disease, ?vasospasm

## 2025-01-21 NOTE — ASSESSMENT & PLAN NOTE
Here for annual wellness visit, overall doing well from a clinical standpoint, other for below.  As for health maintenance:  -breast cancer screen: last mammo 07/24 negative  -colon cancer screen: colonoscopy 03/23, 1 polyp, next 4-5 years ([id number])  -osteoporosis screen:DEXA 2024 normal  -lung cancer screen: not indicated  -BP within goal  -HCV neg 2019  -negative depression and DV screen  -Independent of ADLs and IADLs, declines sig memory change or further w/u at this point  -Advised to eat a healthy, well-balanced diet  -Advised to exercise at least 30min/day 5x/week for heart and bone health  -Check skin regularly for new moles or changes in moles. wear sunscreen at least SPF 30 and don't forget to reapply every 3-4 hours or if you are in the water  -Follow up with dentist at least twice/year.  -Follow up with eye doctor at least once/year.  -Calcium goal is 1200 mg a day ideally from diet (dairy, green leafy vegetables, nuts) , at least 800 units of vitamin D

## 2025-01-22 LAB
CREAT UR-MCNC: 14.7 MG/DL (ref 28–259)
MICROALBUMIN UR DL<=1MG/L-MCNC: <1.2 MG/DL
MICROALBUMIN/CREAT UR: ABNORMAL MG/G (ref 0–30)

## 2025-02-20 PROBLEM — Z00.00 MEDICARE ANNUAL WELLNESS VISIT, SUBSEQUENT: Status: RESOLVED | Noted: 2024-01-09 | Resolved: 2025-02-20

## 2025-02-25 ENCOUNTER — TELEPHONE (OUTPATIENT)
Dept: INTERNAL MEDICINE CLINIC | Age: 75
End: 2025-02-25

## 2025-02-25 NOTE — TELEPHONE ENCOUNTER
Patient is going to have a stress test on Friday at 10 am.    She needs to know what meds she can and can not take and when to take them.    She needs to be npo 4 hours prior to test.

## 2025-02-28 ENCOUNTER — HOSPITAL ENCOUNTER (OUTPATIENT)
Dept: NUCLEAR MEDICINE | Age: 75
Discharge: HOME | End: 2025-02-28
Attending: INTERNAL MEDICINE
Payer: MEDICARE

## 2025-02-28 ENCOUNTER — HOSPITAL ENCOUNTER (OUTPATIENT)
Age: 75
End: 2025-02-28
Attending: INTERNAL MEDICINE
Payer: MEDICARE

## 2025-02-28 DIAGNOSIS — R01.1 HEART MURMUR: ICD-10-CM

## 2025-02-28 DIAGNOSIS — I20.9 ANGINA PECTORIS: ICD-10-CM

## 2025-02-28 LAB
ECHO AO ARCH DIAM: 2.8 CM
ECHO AO ROOT DIAM: 3.2 CM
ECHO AV AREA PEAK VELOCITY: 2.7 CM2
ECHO AV AREA VTI: 2.6 CM2
ECHO AV MEAN GRADIENT: 7 MMHG
ECHO AV MEAN VELOCITY: 1.3 M/S
ECHO AV PEAK GRADIENT: 13 MMHG
ECHO AV PEAK VELOCITY: 1.8 M/S
ECHO AV VELOCITY RATIO: 0.67
ECHO AV VTI: 36.4 CM
ECHO EST RA PRESSURE: 8 MMHG
ECHO IVC EXP: 2.2 CM
ECHO IVC INSP: 1 CM
ECHO LA AREA 2C: 29.2 CM2
ECHO LA AREA 4C: 31.1 CM2
ECHO LA MAJOR AXIS: 7.2 CM
ECHO LA MINOR AXIS: 6.7 CM
ECHO LA VOL BP: 109 ML (ref 22–52)
ECHO LA VOL MOD A2C: 101 ML (ref 22–52)
ECHO LA VOL MOD A4C: 109 ML (ref 22–52)
ECHO LV E' LATERAL VELOCITY: 12.1 CM/S
ECHO LV E' SEPTAL VELOCITY: 3.87 CM/S
ECHO LV EDV A2C: 105 ML
ECHO LV EDV A4C: 128 ML
ECHO LV EF PHYSICIAN: 55 %
ECHO LV EJECTION FRACTION A2C: 57 %
ECHO LV EJECTION FRACTION A4C: 59 %
ECHO LV EJECTION FRACTION BIPLANE: 57 % (ref 55–100)
ECHO LV ESV A2C: 45 ML
ECHO LV ESV A4C: 53 ML
ECHO LV FRACTIONAL SHORTENING: 28 % (ref 28–44)
ECHO LV INTERNAL DIMENSION DIASTOLIC: 5.7 CM (ref 3.9–5.3)
ECHO LV INTERNAL DIMENSION SYSTOLIC: 4.1 CM
ECHO LV IVSD: 1.1 CM (ref 0.6–0.9)
ECHO LV MASS 2D: 288.7 G (ref 67–162)
ECHO LV POSTERIOR WALL DIASTOLIC: 1.3 CM (ref 0.6–0.9)
ECHO LV RELATIVE WALL THICKNESS RATIO: 0.46
ECHO LVOT AREA: 4.2 CM2
ECHO LVOT AV VTI INDEX: 0.62
ECHO LVOT DIAM: 2.3 CM
ECHO LVOT MEAN GRADIENT: 3 MMHG
ECHO LVOT PEAK GRADIENT: 5 MMHG
ECHO LVOT PEAK VELOCITY: 1.2 M/S
ECHO LVOT SV: 94.3 ML
ECHO LVOT VTI: 22.7 CM
ECHO MV A VELOCITY: 0.87 M/S
ECHO MV AREA VTI: 2.9 CM2
ECHO MV E DECELERATION TIME (DT): 177 MS
ECHO MV E VELOCITY: 0.89 M/S
ECHO MV E/A RATIO: 1.02
ECHO MV E/E' LATERAL: 7.36
ECHO MV E/E' RATIO (AVERAGED): 15.18
ECHO MV E/E' SEPTAL: 23
ECHO MV LVOT VTI INDEX: 1.43
ECHO MV MAX VELOCITY: 1.2 M/S
ECHO MV MEAN GRADIENT: 3 MMHG
ECHO MV MEAN VELOCITY: 0.8 M/S
ECHO MV PEAK GRADIENT: 6 MMHG
ECHO MV REGURGITANT PEAK GRADIENT: 92 MMHG
ECHO MV REGURGITANT PEAK VELOCITY: 4.8 M/S
ECHO MV VTI: 32.5 CM
ECHO PV MAX VELOCITY: 1 M/S
ECHO PV MEAN GRADIENT: 3 MMHG
ECHO PV MEAN VELOCITY: 0.7 M/S
ECHO PV PEAK GRADIENT: 4 MMHG
ECHO PV VTI: 19.5 CM
ECHO RA AREA 4C: 19.5 CM2
ECHO RA VOLUME: 56 ML
ECHO RIGHT VENTRICULAR SYSTOLIC PRESSURE (RVSP): 54 MMHG
ECHO RV BASAL DIMENSION: 3.1 CM
ECHO RV FREE WALL PEAK S': 10.9 CM/S
ECHO RV LONGITUDINAL DIMENSION: 7 CM
ECHO RV MID DIMENSION: 3 CM
ECHO RV TAPSE: 2.1 CM (ref 1.7–?)
ECHO TV REGURGITANT MAX VELOCITY: 3.39 M/S
ECHO TV REGURGITANT PEAK GRADIENT: 46 MMHG
NUC STRESS EJECTION FRACTION: 68 %
NUC STRESS LV EDV: 145 ML (ref 56–104)
NUC STRESS LV ESV: 46 ML (ref 19–49)
NUC STRESS LV MASS: 167 G
NUC STRESS LV STROKE VOLUME: 99 ML
STRESS BASELINE DIAS BP: 79 MMHG
STRESS BASELINE HR: 76 BPM
STRESS BASELINE SYS BP: 173 MMHG
STRESS ESTIMATED WORKLOAD: 1 METS
STRESS EXERCISE DUR MIN: 1 MIN
STRESS EXERCISE DUR SEC: 0 SEC
STRESS PEAK DIAS BP: 79 MMHG
STRESS PEAK SYS BP: 173 MMHG
STRESS PERCENT HR ACHIEVED: 69 %
STRESS POST PEAK HR: 101 BPM
STRESS RATE PRESSURE PRODUCT: ABNORMAL BPM*MMHG
STRESS TARGET HR: 146 BPM

## 2025-02-28 PROCEDURE — 93306 TTE W/DOPPLER COMPLETE: CPT

## 2025-02-28 PROCEDURE — 6360000002 HC RX W HCPCS: Performed by: INTERNAL MEDICINE

## 2025-02-28 PROCEDURE — 93017 CV STRESS TEST TRACING ONLY: CPT

## 2025-02-28 PROCEDURE — 78452 HT MUSCLE IMAGE SPECT MULT: CPT

## 2025-02-28 PROCEDURE — 3430000000 HC RX DIAGNOSTIC RADIOPHARMACEUTICAL: Performed by: INTERNAL MEDICINE

## 2025-02-28 PROCEDURE — A9502 TC99M TETROFOSMIN: HCPCS | Performed by: INTERNAL MEDICINE

## 2025-02-28 PROCEDURE — 93306 TTE W/DOPPLER COMPLETE: CPT | Performed by: INTERNAL MEDICINE

## 2025-02-28 RX ORDER — REGADENOSON 0.08 MG/ML
0.4 INJECTION, SOLUTION INTRAVENOUS
Status: COMPLETED | OUTPATIENT
Start: 2025-02-28 | End: 2025-02-28

## 2025-02-28 RX ADMIN — TETROFOSMIN 34.1 MILLICURIE: 1.38 INJECTION, POWDER, LYOPHILIZED, FOR SOLUTION INTRAVENOUS at 10:25

## 2025-02-28 RX ADMIN — REGADENOSON 0.4 MG: 0.08 INJECTION, SOLUTION INTRAVENOUS at 10:25

## 2025-02-28 RX ADMIN — TETROFOSMIN 10.91 MILLICURIE: 1.38 INJECTION, POWDER, LYOPHILIZED, FOR SOLUTION INTRAVENOUS at 10:25

## 2025-03-03 PROBLEM — I51.89 GRADE II DIASTOLIC DYSFUNCTION: Status: ACTIVE | Noted: 2025-03-03

## 2025-03-05 DIAGNOSIS — I27.20 PULMONARY HTN (HCC): ICD-10-CM

## 2025-03-05 DIAGNOSIS — I51.89 DIASTOLIC DYSFUNCTION: Primary | ICD-10-CM

## 2025-03-18 ENCOUNTER — TELEPHONE (OUTPATIENT)
Dept: INTERNAL MEDICINE CLINIC | Age: 75
End: 2025-03-18

## 2025-03-18 DIAGNOSIS — I51.89 DIASTOLIC DYSFUNCTION: Primary | ICD-10-CM

## 2025-03-18 DIAGNOSIS — I27.20 PULMONARY HTN (HCC): ICD-10-CM

## 2025-03-18 NOTE — TELEPHONE ENCOUNTER
Pt would like to see her cardiologist, last seen in 2019 and now needs a new referral sent.    Dr. Oumar Abreu    Ph : [phone]     Referral and test results faxed,     Pt made aware

## 2025-04-17 ENCOUNTER — TELEPHONE (OUTPATIENT)
Dept: INTERNAL MEDICINE CLINIC | Age: 75
End: 2025-04-17

## 2025-04-17 NOTE — TELEPHONE ENCOUNTER
----- Message from Ivan ADAN sent at 4/17/2025 10:09 AM EDT -----  Regarding: [facility] Referral Request  [facility] Referral Request    Reason for referral request: Specialty Provider    Specialist/Lab/Test patient is requesting (if known): Cardiologist    Specialist Phone Number (if applicable): N/A    Additional Information : wanted to have a referral to be sent to a cardiologist  --------------------------------------------------------------------------------------------------------------------------    Relationship to Patient: Self     Call Back Information: OK to leave message on voicemail  Preferred Call Back Number: Phone [phone]     Name of the Specialist requested: Oumar Abreu

## 2025-06-12 DIAGNOSIS — I10 ESSENTIAL HYPERTENSION: Chronic | ICD-10-CM

## 2025-06-12 RX ORDER — METOPROLOL SUCCINATE 100 MG/1
100 TABLET, EXTENDED RELEASE ORAL DAILY
Qty: 90 TABLET | Refills: 1 | Status: SHIPPED | OUTPATIENT
Start: 2025-06-12

## 2025-06-12 RX ORDER — OLMESARTAN MEDOXOMIL AND HYDROCHLOROTHIAZIDE 40/12.5 40; 12.5 MG/1; MG/1
1 TABLET ORAL DAILY
Qty: 90 TABLET | Refills: 1 | Status: SHIPPED | OUTPATIENT
Start: 2025-06-12

## 2025-06-12 RX ORDER — LEVOTHYROXINE SODIUM 25 UG/1
25 TABLET ORAL DAILY
Qty: 90 TABLET | Refills: 1 | Status: SHIPPED | OUTPATIENT
Start: 2025-06-12

## 2025-06-12 RX ORDER — OMEPRAZOLE 40 MG/1
40 CAPSULE, DELAYED RELEASE ORAL DAILY
Qty: 90 CAPSULE | Refills: 1 | Status: SHIPPED | OUTPATIENT
Start: 2025-06-12

## 2025-06-17 DIAGNOSIS — E78.00 PURE HYPERCHOLESTEROLEMIA: ICD-10-CM

## 2025-06-17 NOTE — TELEPHONE ENCOUNTER
Last appointment: 1/21/2025  Next appointment: 7/15/2025        Last refill:   (1/6/2025) by Chester Castellano MD

## 2025-06-18 RX ORDER — ATORVASTATIN CALCIUM 40 MG/1
40 TABLET, FILM COATED ORAL DAILY
Qty: 90 TABLET | Refills: 1 | Status: SHIPPED | OUTPATIENT
Start: 2025-06-18

## 2025-07-11 DIAGNOSIS — E11.9 TYPE 2 DIABETES MELLITUS WITHOUT COMPLICATION, WITHOUT LONG-TERM CURRENT USE OF INSULIN (HCC): Chronic | ICD-10-CM

## 2025-07-11 DIAGNOSIS — E03.8 OTHER SPECIFIED HYPOTHYROIDISM: ICD-10-CM

## 2025-07-11 LAB
ALBUMIN SERPL-MCNC: 4.2 G/DL (ref 3.4–5)
ALBUMIN/GLOB SERPL: 1.8 {RATIO} (ref 1.1–2.2)
ALP SERPL-CCNC: 84 U/L (ref 40–129)
ALT SERPL-CCNC: 13 U/L (ref 10–40)
ANION GAP SERPL CALCULATED.3IONS-SCNC: 13 MMOL/L (ref 3–16)
AST SERPL-CCNC: 14 U/L (ref 15–37)
BILIRUB SERPL-MCNC: 0.4 MG/DL (ref 0–1)
BUN SERPL-MCNC: 12 MG/DL (ref 7–20)
CALCIUM SERPL-MCNC: 9.7 MG/DL (ref 8.3–10.6)
CHLORIDE SERPL-SCNC: 98 MMOL/L (ref 99–110)
CHOLEST SERPL-MCNC: 146 MG/DL (ref 0–199)
CO2 SERPL-SCNC: 26 MMOL/L (ref 21–32)
CREAT SERPL-MCNC: 0.7 MG/DL (ref 0.6–1.2)
CREAT UR-MCNC: 228 MG/DL (ref 28–259)
EST. AVERAGE GLUCOSE BLD GHB EST-MCNC: 148.5 MG/DL
GFR SERPLBLD CREATININE-BSD FMLA CKD-EPI: >90 ML/MIN/{1.73_M2}
GLUCOSE SERPL-MCNC: 131 MG/DL (ref 70–99)
HBA1C MFR BLD: 6.8 %
HDLC SERPL-MCNC: 41 MG/DL (ref 40–60)
LDLC SERPL CALC-MCNC: 73 MG/DL
MICROALBUMIN UR DL<=1MG/L-MCNC: 1.68 MG/DL
MICROALBUMIN/CREAT UR: 7.4 MG/G (ref 0–30)
POTASSIUM SERPL-SCNC: 4.8 MMOL/L (ref 3.5–5.1)
PROT SERPL-MCNC: 6.5 G/DL (ref 6.4–8.2)
SODIUM SERPL-SCNC: 137 MMOL/L (ref 136–145)
TRIGL SERPL-MCNC: 160 MG/DL (ref 0–150)
TSH SERPL DL<=0.005 MIU/L-ACNC: 2.5 UIU/ML (ref 0.27–4.2)
VLDLC SERPL CALC-MCNC: 32 MG/DL

## 2025-07-15 ENCOUNTER — OFFICE VISIT (OUTPATIENT)
Dept: INTERNAL MEDICINE CLINIC | Age: 75
End: 2025-07-15

## 2025-07-15 VITALS
BODY MASS INDEX: 39.9 KG/M2 | HEART RATE: 74 BPM | OXYGEN SATURATION: 99 % | SYSTOLIC BLOOD PRESSURE: 124 MMHG | TEMPERATURE: 98.2 F | WEIGHT: 262.4 LBS | DIASTOLIC BLOOD PRESSURE: 72 MMHG

## 2025-07-15 DIAGNOSIS — I51.89 GRADE II DIASTOLIC DYSFUNCTION: ICD-10-CM

## 2025-07-15 DIAGNOSIS — R29.6 RECURRENT FALLS: Primary | ICD-10-CM

## 2025-07-15 DIAGNOSIS — F33.41 RECURRENT MAJOR DEPRESSIVE DISORDER, IN PARTIAL REMISSION: Chronic | ICD-10-CM

## 2025-07-15 DIAGNOSIS — E03.8 OTHER SPECIFIED HYPOTHYROIDISM: ICD-10-CM

## 2025-07-15 DIAGNOSIS — E11.9 TYPE 2 DIABETES MELLITUS WITHOUT COMPLICATION, WITHOUT LONG-TERM CURRENT USE OF INSULIN (HCC): Chronic | ICD-10-CM

## 2025-07-15 PROBLEM — M79.89 LEFT LEG SWELLING: Status: RESOLVED | Noted: 2024-01-09 | Resolved: 2025-07-15

## 2025-07-15 RX ORDER — SERTRALINE HYDROCHLORIDE 25 MG/1
25 TABLET, FILM COATED ORAL DAILY
Qty: 90 TABLET | Refills: 1 | Status: SHIPPED | OUTPATIENT
Start: 2025-07-15

## 2025-07-15 ASSESSMENT — ENCOUNTER SYMPTOMS: SHORTNESS OF BREATH: 0

## 2025-07-15 NOTE — ASSESSMENT & PLAN NOTE
- Saw cardiology who initially added Jardiance, but due to lack of coverage switched to spironolactone.  Overall tolerating well.  Remains asymptomatic from cardiac standpoint

## 2025-07-15 NOTE — ASSESSMENT & PLAN NOTE
Overall doing well from a clinical standpoint.  Well controlled, but a1c up to 6.8%. No known DM complications.  -Continue metformin 1000 mg twice daily.  We have discussed the option to add the GLP-1 to help with both glycemic control and weight loss.  She is planning to try \"sugar Buster diet\" again with which she was able to lose a lot of weight before  - Was prescribed jardiance for diastolic dysfunction by cardiologist, but was unable to get it covered (but might get from [facility] which is more effordable, she is planning to discuss this with her cardiologist next week)   -BP well controlled.  On ARB  -lipids 07/25 within goal, continue Lipitor 40 mg  -no microalbuminuria 07/25  -Up-to-date with PSV23 vaccine , flu shot.

## 2025-07-15 NOTE — ASSESSMENT & PLAN NOTE
Depression has been worse since we stopped Wellbutrin, her therapist suggested should reconsider treatment.  - Given felt flat with Wellbutrin we will try an SSRI, we will start with a low-dose Zoloft, 25 mg and see her back in 3 months

## 2025-07-15 NOTE — ASSESSMENT & PLAN NOTE
Had 2 mechanical falls in the last 2 weeks, no preceding symptoms, tripped twice.  But I feel physical therapy for fall prevention, strength and balance would be helpful

## 2025-07-15 NOTE — PROGRESS NOTES
[facility]  Follow up visit   7/15/2025    Urvashi Blanton (:  1950) is a 74 y.o. female, here for evaluation of the following medical concerns:    Chief Complaint   Patient presents with    Hypertension    Diabetes    Hypothyroidism           Discuss Labs        ASSESSMENT/ PLAN:  Type 2 diabetes mellitus without complication, without long-term current use of insulin (HCC)  Overall doing well from a clinical standpoint.  Well controlled, but a1c up to 6.8%. No known DM complications.  -Continue metformin 1000 mg twice daily.  We have discussed the option to add the GLP-1 to help with both glycemic control and weight loss.  She is planning to try \"sugar Buster diet\" again with which she was able to lose a lot of weight before  - Was prescribed jardiance for diastolic dysfunction by cardiologist, but was unable to get it covered (but might get from [facility] which is more effordable, she is planning to discuss this with her cardiologist next week)   -BP well controlled.  On ARB  -lipids  within goal, continue Lipitor 40 mg  -no microalbuminuria   -Up-to-date with PSV23 vaccine , flu shot.    Grade II diastolic dysfunction  - Saw cardiology who initially added Jardiance, but due to lack of coverage switched to spironolactone.  Overall tolerating well.  Remains asymptomatic from cardiac standpoint    Other specified hypothyroidism  Clinically and chemically euthyroid   -Continue Synthroid 25 mcg    Recurrent falls  Had 2 mechanical falls in the last 2 weeks, no preceding symptoms, tripped twice.  But I feel physical therapy for fall prevention, strength and balance would be helpful    Recurrent major depressive disorder, in partial remission  Depression has been worse since we stopped Wellbutrin, her therapist suggested should reconsider treatment.  - Given felt flat with Wellbutrin we will try an SSRI, we will start with a low-dose Zoloft, 25 mg and see her back in 3

## 2025-08-13 ENCOUNTER — HOSPITAL ENCOUNTER (OUTPATIENT)
Dept: PHYSICAL THERAPY | Age: 75
Setting detail: THERAPIES SERIES
Discharge: HOME | End: 2025-08-13
Attending: INTERNAL MEDICINE
Payer: MEDICARE

## 2025-08-13 PROCEDURE — 97530 THERAPEUTIC ACTIVITIES: CPT

## 2025-08-13 PROCEDURE — 97112 NEUROMUSCULAR REEDUCATION: CPT

## 2025-08-13 PROCEDURE — 97161 PT EVAL LOW COMPLEX 20 MIN: CPT

## 2025-08-14 ENCOUNTER — HOSPITAL ENCOUNTER (OUTPATIENT)
Dept: MAMMOGRAPHY | Age: 75
Discharge: HOME | End: 2025-08-14
Payer: MEDICARE

## 2025-08-14 VITALS — BODY MASS INDEX: 39.71 KG/M2 | HEIGHT: 68 IN | WEIGHT: 262 LBS

## 2025-08-14 DIAGNOSIS — Z12.31 VISIT FOR SCREENING MAMMOGRAM: ICD-10-CM

## 2025-08-14 PROCEDURE — 77063 BREAST TOMOSYNTHESIS BI: CPT

## 2025-08-15 DIAGNOSIS — E11.9 TYPE 2 DIABETES MELLITUS WITHOUT COMPLICATION, WITHOUT LONG-TERM CURRENT USE OF INSULIN (HCC): Chronic | ICD-10-CM

## 2025-08-18 DIAGNOSIS — F33.41 RECURRENT MAJOR DEPRESSIVE DISORDER, IN PARTIAL REMISSION: Primary | Chronic | ICD-10-CM

## 2025-08-18 RX ORDER — METFORMIN HYDROCHLORIDE 500 MG/1
1000 TABLET, EXTENDED RELEASE ORAL 2 TIMES DAILY WITH MEALS
Qty: 360 TABLET | Refills: 1 | Status: SHIPPED | OUTPATIENT
Start: 2025-08-18

## 2025-08-28 ENCOUNTER — APPOINTMENT (OUTPATIENT)
Dept: GENERAL RADIOLOGY | Age: 75
End: 2025-08-28
Payer: MEDICARE

## 2025-08-28 ENCOUNTER — HOSPITAL ENCOUNTER (EMERGENCY)
Age: 75
Discharge: HOME | End: 2025-08-28
Attending: EMERGENCY MEDICINE
Payer: MEDICARE

## 2025-08-28 VITALS
OXYGEN SATURATION: 100 % | BODY MASS INDEX: 38.75 KG/M2 | RESPIRATION RATE: 12 BRPM | TEMPERATURE: 98.3 F | DIASTOLIC BLOOD PRESSURE: 73 MMHG | HEIGHT: 68 IN | SYSTOLIC BLOOD PRESSURE: 132 MMHG | HEART RATE: 67 BPM | WEIGHT: 255.7 LBS

## 2025-08-28 DIAGNOSIS — R07.9 CHEST PAIN, UNSPECIFIED TYPE: Primary | ICD-10-CM

## 2025-08-28 DIAGNOSIS — R07.89 ATYPICAL CHEST PAIN: ICD-10-CM

## 2025-08-28 LAB
ANION GAP SERPL CALCULATED.3IONS-SCNC: 16 MMOL/L (ref 3–16)
BASOPHILS # BLD: 0.1 K/UL (ref 0–0.2)
BASOPHILS NFR BLD: 0.7 %
BUN SERPL-MCNC: 11 MG/DL (ref 7–20)
CALCIUM SERPL-MCNC: 10 MG/DL (ref 8.3–10.6)
CHLORIDE SERPL-SCNC: 97 MMOL/L (ref 99–110)
CO2 SERPL-SCNC: 23 MMOL/L (ref 21–32)
CREAT SERPL-MCNC: 0.6 MG/DL (ref 0.6–1.2)
DEPRECATED RDW RBC AUTO: 15.6 % (ref 12.4–15.4)
EKG ATRIAL RATE: 88 BPM
EKG DIAGNOSIS: NORMAL
EKG P AXIS: -3 DEGREES
EKG P-R INTERVAL: 138 MS
EKG Q-T INTERVAL: 354 MS
EKG QRS DURATION: 80 MS
EKG QTC CALCULATION (BAZETT): 428 MS
EKG R AXIS: -1 DEGREES
EKG T AXIS: 1 DEGREES
EKG VENTRICULAR RATE: 88 BPM
EOSINOPHIL # BLD: 0.3 K/UL (ref 0–0.6)
EOSINOPHIL NFR BLD: 4.2 %
GFR SERPLBLD CREATININE-BSD FMLA CKD-EPI: >90 ML/MIN/{1.73_M2}
GLUCOSE SERPL-MCNC: 122 MG/DL (ref 70–99)
HCT VFR BLD AUTO: 37.6 % (ref 36–48)
HGB BLD-MCNC: 12.7 G/DL (ref 12–16)
LYMPHOCYTES # BLD: 2.1 K/UL (ref 1–5.1)
LYMPHOCYTES NFR BLD: 26.8 %
MCH RBC QN AUTO: 29.2 PG (ref 26–34)
MCHC RBC AUTO-ENTMCNC: 33.8 G/DL (ref 31–36)
MCV RBC AUTO: 86.4 FL (ref 80–100)
MONOCYTES # BLD: 0.6 K/UL (ref 0–1.3)
MONOCYTES NFR BLD: 8.4 %
NEUTROPHILS # BLD: 4.6 K/UL (ref 1.7–7.7)
NEUTROPHILS NFR BLD: 59.9 %
NT-PROBNP SERPL-MCNC: 228 PG/ML (ref 0–449)
PLATELET # BLD AUTO: 358 K/UL (ref 135–450)
PMV BLD AUTO: 7.3 FL (ref 5–10.5)
POTASSIUM SERPL-SCNC: 3.9 MMOL/L (ref 3.5–5.1)
POTASSIUM SERPL-SCNC: 4.3 MMOL/L (ref 3.5–5.1)
RBC # BLD AUTO: 4.35 M/UL (ref 4–5.2)
SODIUM SERPL-SCNC: 136 MMOL/L (ref 136–145)
TROPONIN, HIGH SENSITIVITY: 14 NG/L (ref 0–14)
TROPONIN, HIGH SENSITIVITY: 18 NG/L (ref 0–14)
TROPONIN, HIGH SENSITIVITY: 19 NG/L (ref 0–14)
WBC # BLD AUTO: 7.7 K/UL (ref 4–11)

## 2025-08-28 PROCEDURE — 71046 X-RAY EXAM CHEST 2 VIEWS: CPT

## 2025-08-28 PROCEDURE — 85025 COMPLETE CBC W/AUTO DIFF WBC: CPT

## 2025-08-28 PROCEDURE — 84484 ASSAY OF TROPONIN QUANT: CPT

## 2025-08-28 PROCEDURE — 83880 ASSAY OF NATRIURETIC PEPTIDE: CPT

## 2025-08-28 PROCEDURE — 80048 BASIC METABOLIC PNL TOTAL CA: CPT

## 2025-08-28 PROCEDURE — 93005 ELECTROCARDIOGRAM TRACING: CPT | Performed by: STUDENT IN AN ORGANIZED HEALTH CARE EDUCATION/TRAINING PROGRAM

## 2025-08-28 PROCEDURE — 99285 EMERGENCY DEPT VISIT HI MDM: CPT

## 2025-08-28 PROCEDURE — 84132 ASSAY OF SERUM POTASSIUM: CPT

## 2025-08-28 ASSESSMENT — PAIN SCALES - GENERAL: PAINLEVEL_OUTOF10: 7

## 2025-08-28 ASSESSMENT — PAIN DESCRIPTION - DESCRIPTORS: DESCRIPTORS: CRUSHING;PRESSURE

## 2025-08-28 ASSESSMENT — PAIN DESCRIPTION - LOCATION: LOCATION: CHEST

## 2025-08-28 ASSESSMENT — PAIN DESCRIPTION - ORIENTATION: ORIENTATION: MID

## 2025-08-28 ASSESSMENT — PAIN - FUNCTIONAL ASSESSMENT: PAIN_FUNCTIONAL_ASSESSMENT: PREVENTS OR INTERFERES SOME ACTIVE ACTIVITIES AND ADLS

## 2025-08-29 ENCOUNTER — CARE COORDINATION (OUTPATIENT)
Dept: CARE COORDINATION | Age: 75
End: 2025-08-29

## 2025-09-02 ENCOUNTER — HOSPITAL ENCOUNTER (OUTPATIENT)
Dept: PHYSICAL THERAPY | Age: 75
Setting detail: THERAPIES SERIES
Discharge: HOME | End: 2025-09-02
Attending: INTERNAL MEDICINE
Payer: MEDICARE

## 2025-09-02 PROCEDURE — 97530 THERAPEUTIC ACTIVITIES: CPT

## 2025-09-02 PROCEDURE — 97110 THERAPEUTIC EXERCISES: CPT

## 2025-09-04 ENCOUNTER — CARE COORDINATION (OUTPATIENT)
Dept: CARE COORDINATION | Age: 75
End: 2025-09-04

## 2025-09-04 RX ORDER — RANOLAZINE 500 MG/1
500 TABLET, EXTENDED RELEASE ORAL 2 TIMES DAILY
COMMUNITY

## 2025-09-04 SDOH — HEALTH STABILITY: PHYSICAL HEALTH: ON AVERAGE, HOW MANY DAYS PER WEEK DO YOU ENGAGE IN MODERATE TO STRENUOUS EXERCISE (LIKE A BRISK WALK)?: 4 DAYS

## 2025-09-04 SDOH — SOCIAL STABILITY: SOCIAL NETWORK: HOW OFTEN DO YOU GET TOGETHER WITH FRIENDS OR RELATIVES?: ONCE A WEEK

## 2025-09-04 SDOH — ECONOMIC STABILITY: FOOD INSECURITY: WITHIN THE PAST 12 MONTHS, THE FOOD YOU BOUGHT JUST DIDN'T LAST AND YOU DIDN'T HAVE MONEY TO GET MORE.: NEVER TRUE

## 2025-09-04 SDOH — HEALTH STABILITY: PHYSICAL HEALTH: ON AVERAGE, HOW MANY MINUTES DO YOU ENGAGE IN EXERCISE AT THIS LEVEL?: 20 MIN

## 2025-09-04 SDOH — HEALTH STABILITY: MENTAL HEALTH: HOW OFTEN DO YOU HAVE A DRINK CONTAINING ALCOHOL?: NEVER

## 2025-09-04 SDOH — ECONOMIC STABILITY: FOOD INSECURITY: WITHIN THE PAST 12 MONTHS, YOU WORRIED THAT YOUR FOOD WOULD RUN OUT BEFORE YOU GOT THE MONEY TO BUY MORE.: NEVER TRUE

## 2025-09-04 SDOH — HEALTH STABILITY: MENTAL HEALTH
DO YOU FEEL STRESS - TENSE, RESTLESS, NERVOUS, OR ANXIOUS, OR UNABLE TO SLEEP AT NIGHT BECAUSE YOUR MIND IS TROUBLED ALL THE TIME - THESE DAYS?: NOT AT ALL

## 2025-09-04 SDOH — ECONOMIC STABILITY: HOUSING INSECURITY: IN THE LAST 12 MONTHS, WAS THERE A TIME WHEN YOU WERE NOT ABLE TO PAY THE MORTGAGE OR RENT ON TIME?: NO

## 2025-09-04 SDOH — ECONOMIC STABILITY: FOOD INSECURITY: HOW HARD IS IT FOR YOU TO PAY FOR THE VERY BASICS LIKE FOOD, HOUSING, MEDICAL CARE, AND HEATING?: NOT HARD AT ALL

## 2025-09-04 SDOH — SOCIAL STABILITY: SOCIAL NETWORK
IN A TYPICAL WEEK, HOW MANY TIMES DO YOU TALK ON THE PHONE WITH FAMILY, FRIENDS, OR NEIGHBORS?: MORE THAN THREE TIMES A WEEK

## 2025-09-04 SDOH — SOCIAL STABILITY: SOCIAL NETWORK
DO YOU BELONG TO ANY CLUBS OR ORGANIZATIONS SUCH AS CHURCH GROUPS, UNIONS, FRATERNAL OR ATHLETIC GROUPS, OR SCHOOL GROUPS?: NO

## 2025-09-04 SDOH — SOCIAL STABILITY: SOCIAL NETWORK: HOW OFTEN DO YOU ATTEND CHURCH OR RELIGIOUS SERVICES?: NEVER

## 2025-09-04 SDOH — SOCIAL STABILITY: SOCIAL INSECURITY: ARE YOU MARRIED, WIDOWED, DIVORCED, SEPARATED, NEVER MARRIED, OR LIVING WITH A PARTNER?: MARRIED

## 2025-09-04 SDOH — HEALTH STABILITY: MENTAL HEALTH: HOW MANY DRINKS CONTAINING ALCOHOL DO YOU HAVE ON A TYPICAL DAY WHEN YOU ARE DRINKING?: PATIENT DOES NOT DRINK

## 2025-09-04 SDOH — SOCIAL STABILITY: SOCIAL NETWORK: HOW OFTEN DO YOU ATTEND MEETINGS OF THE CLUBS OR ORGANIZATIONS YOU BELONG TO?: NEVER

## 2025-09-04 SDOH — ECONOMIC STABILITY: TRANSPORTATION INSECURITY: IN THE PAST 12 MONTHS, HAS LACK OF TRANSPORTATION KEPT YOU FROM MEDICAL APPOINTMENTS OR FROM GETTING MEDICATIONS?: NO

## 2025-09-04 ASSESSMENT — ACTIVITIES OF DAILY LIVING (ADL): LACK_OF_TRANSPORTATION: NO

## (undated) DEVICE — COUNTER NDL 40 COUNT HLD 70 NUM FOAM BLK SGL MAG W BLDE REMV

## (undated) DEVICE — NEEDLE SPNL L3.5IN PNK HUB S STL REG WALL FIT STYL W/ QNCKE

## (undated) DEVICE — SYRINGE IRRIG 60ML SFT PLIABLE BLB EZ TO GRP 1 HND USE W/

## (undated) DEVICE — BANDAGE COBAN 4 IN COMPR W4INXL5YD FOAM COHESIVE QUIK STK SELF ADH SFT

## (undated) DEVICE — TUBING, SUCTION, 1/4" X 12', STRAIGHT: Brand: MEDLINE

## (undated) DEVICE — HANDPIECE SUCTION TUBING INTERPULSE 10FT

## (undated) DEVICE — SPONGE,LAP,18"X18",DLX,XR,ST,5/PK,40/PK: Brand: MEDLINE

## (undated) DEVICE — MARKER SURG SKIN UTIL BLK REG TIP NONSMEARING W/ 6IN RUL

## (undated) DEVICE — COVER LT HNDL BLU PLAS

## (undated) DEVICE — SYRINGE MED 30ML STD CLR PLAS LUERLOCK TIP N CTRL DISP

## (undated) DEVICE — PLATE ES AD W 9FT CRD 2

## (undated) DEVICE — BLANKET WRM W29.9XL79.1IN UP BODY FORC AIR MISTRAL-AIR

## (undated) DEVICE — STERILE PVP: Brand: MEDLINE INDUSTRIES, INC.

## (undated) DEVICE — SOLUTION IV 1000ML 0.9% SOD CHL

## (undated) DEVICE — E-Z CLEAN, NON-STICK, PTFE COATED, ELECTROSURGICAL BLADE ELECTRODE, 4 INCH (10.2 CM): Brand: MEGADYNE

## (undated) DEVICE — INTENDED FOR TISSUE SEPARATION, AND OTHER PROCEDURES THAT REQUIRE A SHARP SURGICAL BLADE TO PUNCTURE OR CUT.: Brand: BARD-PARKER ® CARBON RIB-BACK BLADES

## (undated) DEVICE — STERILE POLYISOPRENE POWDER-FREE SURGICAL GLOVES WITH EMOLLIENT COATING: Brand: PROTEXIS

## (undated) DEVICE — Z INACTIVE NO SUPPLIER SOLUTIONIRRIG 3000ML 0.9% SOD CHL FLX CONT [79720808] [HOSPIRA WORLDWIDE INC]

## (undated) DEVICE — SOLUTION IRRIG 1000ML 09% SOD CHL USP PIC PLAS CONTAINER

## (undated) DEVICE — GOWN,SIRUS,POLYRNF,BRTHSLV,LG,30/CS: Brand: MEDLINE

## (undated) DEVICE — ORTHO PRE OP PACK: Brand: MEDLINE INDUSTRIES, INC.

## (undated) DEVICE — SOLUTION IV IRRIG WATER 1000ML POUR BRL 2F7114

## (undated) DEVICE — SUTURE STRATAFIX SPRL SZ 1 L14IN ABSRB VLT L48CM CTX 1/2 SXPD2B405

## (undated) DEVICE — HOLDER SCALP PLAS G STD

## (undated) DEVICE — CUFF RESTRN WRST OR ANK 45FT AD FOAM

## (undated) DEVICE — SOLUTION IV 250ML 0.9% SOD CHL PH 5 INJ USP VIAFLX PLAS

## (undated) DEVICE — PEEL-AWAY HOOD: Brand: FLYTE, SURGICOOL

## (undated) DEVICE — STERILE SYNTHETIC POLYISOPRENE POWDER-FREE SURGICAL GLOVES WITH HYDROGEL COATING, SMOOTH FINISH, STRAIGHT FINGER: Brand: PROTEXIS

## (undated) DEVICE — DRAPE C ARM UNIV W41XL74IN CLR PLAS XR VELC CLSR POLY STRP

## (undated) DEVICE — BIPOLAR SEALER 23-112-1 AQM 6.0: Brand: AQUAMANTYS ®

## (undated) DEVICE — GLOVE 6 LTX ST BIOGEL M PF BEAD CUFF

## (undated) DEVICE — STAPLER SKIN H3.9MM WIRE DIA0.58MM CRWN 6.9MM 35 STPL ROT

## (undated) DEVICE — SUTURE VCRL SZ 0 L18IN ABSRB UD L36MM CT-1 1/2 CIR J840D

## (undated) DEVICE — PROTECTOR ULN NRV PUR FOAM HK LOOP STRP ANATOMICALLY

## (undated) DEVICE — E-Z CLEAN, NON-STICK, PTFE COATED, ELECTROSURGICAL BLADE ELECTRODE, 2.5 INCH (6.35 CM): Brand: EZ CLEAN

## (undated) DEVICE — GOWN,SIRUS,POLYRNF,BRTHSLV,XL,30/CS: Brand: MEDLINE

## (undated) DEVICE — COTTON UNDERCAST PADDING,CRIMPED FINISH: Brand: WEBRIL

## (undated) DEVICE — JEWISH HOSPITAL TURNOVER KIT: Brand: MEDLINE INDUSTRIES, INC.

## (undated) DEVICE — APPLICATOR PREP 26ML 0.7% IOD POVACRYLEX 74% ISO ALC ST

## (undated) DEVICE — ELECTROSURGICAL PENCIL ROCKER SWITCH NON COATED BLADE ELECTRODE 10 FT (3 M) CORD HOLSTER: Brand: MEGADYNE

## (undated) DEVICE — COAXIAL HIGH FLOW TIP WITH SOFT SHIELD

## (undated) DEVICE — SUTURE VCRL SZ 2-0 L18IN ABSRB UD CT-1 L36MM 1/2 CIR J839D

## (undated) DEVICE — YANKAUER,OPEN TIP,W/O VENT,STERILE: Brand: MEDLINE INDUSTRIES, INC.

## (undated) DEVICE — SURE SET-DOUBLE BASIN-LF: Brand: MEDLINE INDUSTRIES, INC.

## (undated) DEVICE — UNDERGLOVE SURG SZ 8.5 FNGR THK0.21MIL GRN LTX BEAD CUF

## (undated) DEVICE — DUAL CUT SAGITTAL BLADE

## (undated) DEVICE — PAD DRY FLOOR ABS 32X58IN GRN

## (undated) DEVICE — 6619 2 PTNT ISO SYS INCISE AREA&LT;(&GT;&&LT;)&GT;P: Brand: STERI-DRAPE™ IOBAN™ 2

## (undated) DEVICE — SUTURE VCRL SZ 1 L18IN ABSRB UD L36MM CT-1 1/2 CIR J841D